# Patient Record
Sex: FEMALE | Race: BLACK OR AFRICAN AMERICAN | NOT HISPANIC OR LATINO | URBAN - METROPOLITAN AREA
[De-identification: names, ages, dates, MRNs, and addresses within clinical notes are randomized per-mention and may not be internally consistent; named-entity substitution may affect disease eponyms.]

---

## 2017-07-29 ENCOUNTER — EMERGENCY (EMERGENCY)
Facility: HOSPITAL | Age: 67
LOS: 0 days | Discharge: HOME | End: 2017-07-29
Attending: EMERGENCY MEDICINE
Payer: COMMERCIAL

## 2017-07-29 VITALS
DIASTOLIC BLOOD PRESSURE: 108 MMHG | HEIGHT: 64 IN | HEART RATE: 74 BPM | WEIGHT: 160.06 LBS | TEMPERATURE: 98 F | RESPIRATION RATE: 18 BRPM | SYSTOLIC BLOOD PRESSURE: 168 MMHG | OXYGEN SATURATION: 98 %

## 2017-07-29 VITALS
DIASTOLIC BLOOD PRESSURE: 90 MMHG | TEMPERATURE: 98 F | HEART RATE: 68 BPM | RESPIRATION RATE: 13 BRPM | SYSTOLIC BLOOD PRESSURE: 137 MMHG | OXYGEN SATURATION: 98 %

## 2017-07-29 DIAGNOSIS — R07.89 OTHER CHEST PAIN: ICD-10-CM

## 2017-07-29 LAB
ALBUMIN SERPL ELPH-MCNC: 3.2 G/DL — LOW (ref 3.3–5)
ALP SERPL-CCNC: 70 U/L — SIGNIFICANT CHANGE UP (ref 40–120)
ALT FLD-CCNC: 28 U/L — SIGNIFICANT CHANGE UP (ref 12–78)
ANION GAP SERPL CALC-SCNC: 7 MMOL/L — SIGNIFICANT CHANGE UP (ref 5–17)
APPEARANCE UR: CLEAR — SIGNIFICANT CHANGE UP
APTT BLD: 28.8 SEC — SIGNIFICANT CHANGE UP (ref 27.5–37.4)
AST SERPL-CCNC: 16 U/L — SIGNIFICANT CHANGE UP (ref 15–37)
BILIRUB SERPL-MCNC: 0.4 MG/DL — SIGNIFICANT CHANGE UP (ref 0.2–1.2)
BILIRUB UR-MCNC: NEGATIVE — SIGNIFICANT CHANGE UP
BUN SERPL-MCNC: 12 MG/DL — SIGNIFICANT CHANGE UP (ref 7–23)
CALCIUM SERPL-MCNC: 8.9 MG/DL — SIGNIFICANT CHANGE UP (ref 8.5–10.1)
CHLORIDE SERPL-SCNC: 104 MMOL/L — SIGNIFICANT CHANGE UP (ref 96–108)
CO2 SERPL-SCNC: 28 MMOL/L — SIGNIFICANT CHANGE UP (ref 22–31)
COLOR SPEC: YELLOW — SIGNIFICANT CHANGE UP
CREAT SERPL-MCNC: 0.82 MG/DL — SIGNIFICANT CHANGE UP (ref 0.5–1.3)
DIFF PNL FLD: ABNORMAL
GLUCOSE SERPL-MCNC: 116 MG/DL — HIGH (ref 70–99)
GLUCOSE UR QL: NEGATIVE MG/DL — SIGNIFICANT CHANGE UP
HCT VFR BLD CALC: 40.6 % — SIGNIFICANT CHANGE UP (ref 34.5–45)
HGB BLD-MCNC: 13.1 G/DL — SIGNIFICANT CHANGE UP (ref 11.5–15.5)
INR BLD: 1.04 RATIO — SIGNIFICANT CHANGE UP (ref 0.88–1.16)
KETONES UR-MCNC: NEGATIVE — SIGNIFICANT CHANGE UP
LEUKOCYTE ESTERASE UR-ACNC: NEGATIVE — SIGNIFICANT CHANGE UP
MCHC RBC-ENTMCNC: 30.4 PG — SIGNIFICANT CHANGE UP (ref 27–34)
MCHC RBC-ENTMCNC: 32.1 GM/DL — SIGNIFICANT CHANGE UP (ref 32–36)
MCV RBC AUTO: 94.5 FL — SIGNIFICANT CHANGE UP (ref 80–100)
NITRITE UR-MCNC: NEGATIVE — SIGNIFICANT CHANGE UP
PH UR: 8 — SIGNIFICANT CHANGE UP (ref 5–8)
PLATELET # BLD AUTO: 251 K/UL — SIGNIFICANT CHANGE UP (ref 150–400)
POTASSIUM SERPL-MCNC: 4 MMOL/L — SIGNIFICANT CHANGE UP (ref 3.5–5.3)
POTASSIUM SERPL-SCNC: 4 MMOL/L — SIGNIFICANT CHANGE UP (ref 3.5–5.3)
PROT SERPL-MCNC: 7.6 GM/DL — SIGNIFICANT CHANGE UP (ref 6–8.3)
PROT UR-MCNC: NEGATIVE MG/DL — SIGNIFICANT CHANGE UP
PROTHROM AB SERPL-ACNC: 11.3 SEC — SIGNIFICANT CHANGE UP (ref 9.8–12.7)
RBC # BLD: 4.3 M/UL — SIGNIFICANT CHANGE UP (ref 3.8–5.2)
RBC # FLD: 10.8 % — LOW (ref 11–15)
SODIUM SERPL-SCNC: 139 MMOL/L — SIGNIFICANT CHANGE UP (ref 135–145)
SP GR SPEC: 1.01 — SIGNIFICANT CHANGE UP (ref 1.01–1.02)
TROPONIN I SERPL-MCNC: <.015 NG/ML — SIGNIFICANT CHANGE UP (ref 0.01–0.04)
UROBILINOGEN FLD QL: NEGATIVE MG/DL — SIGNIFICANT CHANGE UP
WBC # BLD: 5.5 K/UL — SIGNIFICANT CHANGE UP (ref 3.8–10.5)
WBC # FLD AUTO: 5.5 K/UL — SIGNIFICANT CHANGE UP (ref 3.8–10.5)

## 2017-07-29 PROCEDURE — 71010: CPT | Mod: 26

## 2017-07-29 PROCEDURE — 93010 ELECTROCARDIOGRAM REPORT: CPT

## 2017-07-29 PROCEDURE — 99284 EMERGENCY DEPT VISIT MOD MDM: CPT

## 2017-07-29 RX ORDER — ASPIRIN/CALCIUM CARB/MAGNESIUM 324 MG
325 TABLET ORAL ONCE
Qty: 0 | Refills: 0 | Status: COMPLETED | OUTPATIENT
Start: 2017-07-29 | End: 2017-07-29

## 2017-07-29 RX ORDER — FAMOTIDINE 10 MG/ML
1 INJECTION INTRAVENOUS
Qty: 14 | Refills: 0
Start: 2017-07-29 | End: 2017-08-05

## 2017-07-29 RX ADMIN — Medication 325 MILLIGRAM(S): at 08:58

## 2017-07-29 NOTE — ED PROVIDER NOTE - OBJECTIVE STATEMENT
68 yo F with chest pain from right neck to left arm and back.  Pt. says it's been present for 4 days now.  She describes pain as pressure-like.  Comes and goes, resting in a quiet room makes it better.  No other complaints, currently.  ROS: negative for fever, cough, headache, shortness of breath, abd pain, nausea, vomiting, diarrhea, rash, paresthesia, and weakness.   PMH: NIDDM, HTN; Meds: metformin, valsartan; SH: Denies smoking/drinking/drug use

## 2017-07-29 NOTE — ED ADULT TRIAGE NOTE - TEMPERATURE IN FAHRENHEIT (DEGREES F)
Agus Razo   MRN: [id number]    Department:  [facility] in [place]   Date of Visit:  12/3/2019           Disclosure     Insurance plans vary and the physician(s) referred by the ER may not be covered by your plan.  Please con tell this physician (or your personal doctor if your instructions are to return to your personal doctor) about any new or lasting problems. The primary care or specialist physician will see patients referred from the [facility] Emergency Department.  Farzad Rivera 97.8

## 2017-07-29 NOTE — ED PROVIDER NOTE - MEDICAL DECISION MAKING DETAILS
68 yo F with chest pain for 4 days  -cxr, ekg, cardiac monitor, basic labs, trop, asa, ua, cx  -f/u results

## 2017-07-29 NOTE — ED PROVIDER NOTE - PHYSICAL EXAMINATION
Vitals: htn at 168/108, otherwise WNL  Gen: AAOx3, NAD, sitting comfortably in stretcher, non-toxic, calm and cooperative  Head: ncat, perrla, eomi b/l  Neck: supple, no lymphadenopathy, no midline deviation  Heart: rrr, no m/r/g  Lungs: CTA b/l, no rales/ronchi/wheezes  Abd: soft, nontender, non-distended, no rebound or guarding  Ext: no clubbing/cyanosis/edema  Neuro: sensation and muscle strength intact b/l, steady gait

## 2017-07-29 NOTE — ED ADULT NURSE NOTE - OBJECTIVE STATEMENT
Pt with chest  pains on and off for 1 week . The pain worse last night. She was not able to sleep. The pan 10/10 tightness and squeezing to her chest radiating to her back. Occasional cough nonproductive. She has a history of HTN on medication.

## 2017-07-30 LAB
CULTURE RESULTS: SIGNIFICANT CHANGE UP
SPECIMEN SOURCE: SIGNIFICANT CHANGE UP

## 2019-03-29 ENCOUNTER — APPOINTMENT (OUTPATIENT)
Dept: ELECTROPHYSIOLOGY | Facility: CLINIC | Age: 69
End: 2019-03-29
Payer: MEDICARE

## 2019-04-02 ENCOUNTER — NON-APPOINTMENT (OUTPATIENT)
Age: 69
End: 2019-04-02

## 2019-04-02 ENCOUNTER — APPOINTMENT (OUTPATIENT)
Dept: ELECTROPHYSIOLOGY | Facility: CLINIC | Age: 69
End: 2019-04-02
Payer: MEDICARE

## 2019-04-02 VITALS — HEIGHT: 64 IN | OXYGEN SATURATION: 98 % | HEART RATE: 71 BPM

## 2019-04-02 VITALS — DIASTOLIC BLOOD PRESSURE: 87 MMHG | SYSTOLIC BLOOD PRESSURE: 152 MMHG

## 2019-04-02 VITALS — SYSTOLIC BLOOD PRESSURE: 150 MMHG | DIASTOLIC BLOOD PRESSURE: 97 MMHG

## 2019-04-02 VITALS — WEIGHT: 158 LBS

## 2019-04-02 DIAGNOSIS — Z86.79 PERSONAL HISTORY OF OTHER DISEASES OF THE CIRCULATORY SYSTEM: ICD-10-CM

## 2019-04-02 PROCEDURE — 99204 OFFICE O/P NEW MOD 45 MIN: CPT

## 2019-04-02 PROCEDURE — 93000 ELECTROCARDIOGRAM COMPLETE: CPT | Mod: 59

## 2019-04-02 PROCEDURE — 93280 PM DEVICE PROGR EVAL DUAL: CPT

## 2019-04-02 NOTE — PHYSICAL EXAM
[General Appearance - Well Developed] : well developed [Normal Appearance] : normal appearance [Well Groomed] : well groomed [General Appearance - Well Nourished] : well nourished [No Deformities] : no deformities [General Appearance - In No Acute Distress] : no acute distress [Normal Conjunctiva] : the conjunctiva exhibited no abnormalities [Eyelids - No Xanthelasma] : the eyelids demonstrated no xanthelasmas [Normal Oral Mucosa] : normal oral mucosa [No Oral Pallor] : no oral pallor [No Oral Cyanosis] : no oral cyanosis [Normal Jugular Venous A Waves Present] : normal jugular venous A waves present [Normal Jugular Venous V Waves Present] : normal jugular venous V waves present [No Jugular Venous Bustos A Waves] : no jugular venous bustos A waves [Heart Rate And Rhythm] : heart rate and rhythm were normal [Heart Sounds] : normal S1 and S2 [Murmurs] : no murmurs present [Respiration, Rhythm And Depth] : normal respiratory rhythm and effort [Exaggerated Use Of Accessory Muscles For Inspiration] : no accessory muscle use [Auscultation Breath Sounds / Voice Sounds] : lungs were clear to auscultation bilaterally [Abdomen Soft] : soft [Abdomen Tenderness] : non-tender [Abdomen Mass (___ Cm)] : no abdominal mass palpated [Abnormal Walk] : normal gait [Gait - Sufficient For Exercise Testing] : the gait was sufficient for exercise testing [Nail Clubbing] : no clubbing of the fingernails [Cyanosis, Localized] : no localized cyanosis [Petechial Hemorrhages (___cm)] : no petechial hemorrhages [] : no ischemic changes [Oriented To Time, Place, And Person] : oriented to person, place, and time [Affect] : the affect was normal [Mood] : the mood was normal [No Anxiety] : not feeling anxious

## 2019-04-03 NOTE — DISCUSSION/SUMMARY
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 Admits doing well but does note episodes of palpitations which occur spontaneously and are brief in duration. Denies any recent cardiac work up and no follow up with a Cardiologist. Denies chest pain, SOB, syncope or near syncope. Device check performed today revealed device in good working status with adequate pacing/sensing thresholds although brief episodes of noise noted on atrial lead. Also noted episodes of AT and one episode of NSVT. Recommend ECHO/stress test and initiate metoprolol succ 25mg daily. Will continue regular f/u in device as scheduled. \par \par Sincerely,\par \par Jonah Gutierrez MD

## 2019-04-03 NOTE — HISTORY OF PRESENT ILLNESS
[FreeTextEntry1] : Mariza Sapp MD\par \par I saw Cinthya Larsen on April 2, 2019. As you know, she is a 67y/o woman with Hx of HTN and complete AV block s/p dual chamber PPM placement in [place], [place] who presents today to transition care of her device. Admits doing well but does note episodes of palpitations which occur spontaneously and are brief in duration. Denies any recent cardiac work up and no follow up with a Cardiologist. Denies chest pain, SOB, syncope or near syncope. \par

## 2019-04-24 ENCOUNTER — APPOINTMENT (OUTPATIENT)
Dept: CV DIAGNOSITCS | Facility: HOSPITAL | Age: 69
End: 2019-04-24

## 2019-04-24 ENCOUNTER — APPOINTMENT (OUTPATIENT)
Dept: CV DIAGNOSTICS | Facility: HOSPITAL | Age: 69
End: 2019-04-24

## 2019-04-24 ENCOUNTER — OUTPATIENT (OUTPATIENT)
Dept: OUTPATIENT SERVICES | Facility: HOSPITAL | Age: 69
LOS: 1 days | End: 2019-04-24
Payer: MEDICARE

## 2019-04-24 ENCOUNTER — OTHER (OUTPATIENT)
Age: 69
End: 2019-04-24

## 2019-04-24 DIAGNOSIS — I47.2 VENTRICULAR TACHYCARDIA: ICD-10-CM

## 2019-04-24 PROCEDURE — 93016 CV STRESS TEST SUPVJ ONLY: CPT | Mod: GC

## 2019-04-24 PROCEDURE — 93018 CV STRESS TEST I&R ONLY: CPT | Mod: GC

## 2019-04-24 PROCEDURE — 78452 HT MUSCLE IMAGE SPECT MULT: CPT | Mod: 26

## 2019-07-25 ENCOUNTER — APPOINTMENT (OUTPATIENT)
Dept: ELECTROPHYSIOLOGY | Facility: CLINIC | Age: 69
End: 2019-07-25
Payer: MEDICARE

## 2019-07-25 PROCEDURE — 93294 REM INTERROG EVL PM/LDLS PM: CPT

## 2019-07-25 PROCEDURE — 93296 REM INTERROG EVL PM/IDS: CPT

## 2019-08-27 ENCOUNTER — EMERGENCY (EMERGENCY)
Facility: HOSPITAL | Age: 69
LOS: 0 days | Discharge: HOME | End: 2019-08-27
Attending: STUDENT IN AN ORGANIZED HEALTH CARE EDUCATION/TRAINING PROGRAM
Payer: MEDICARE

## 2019-08-27 VITALS
OXYGEN SATURATION: 99 % | HEIGHT: 65 IN | TEMPERATURE: 99 F | WEIGHT: 160.06 LBS | DIASTOLIC BLOOD PRESSURE: 97 MMHG | SYSTOLIC BLOOD PRESSURE: 147 MMHG | HEART RATE: 81 BPM

## 2019-08-27 VITALS
SYSTOLIC BLOOD PRESSURE: 161 MMHG | HEART RATE: 71 BPM | RESPIRATION RATE: 16 BRPM | OXYGEN SATURATION: 100 % | DIASTOLIC BLOOD PRESSURE: 96 MMHG

## 2019-08-27 DIAGNOSIS — R51 HEADACHE: ICD-10-CM

## 2019-08-27 DIAGNOSIS — I10 ESSENTIAL (PRIMARY) HYPERTENSION: ICD-10-CM

## 2019-08-27 LAB
ALBUMIN SERPL ELPH-MCNC: 3.1 G/DL — LOW (ref 3.3–5)
ALP SERPL-CCNC: 72 U/L — SIGNIFICANT CHANGE UP (ref 40–120)
ALT FLD-CCNC: 29 U/L — SIGNIFICANT CHANGE UP (ref 12–78)
ANION GAP SERPL CALC-SCNC: 5 MMOL/L — SIGNIFICANT CHANGE UP (ref 5–17)
AST SERPL-CCNC: 16 U/L — SIGNIFICANT CHANGE UP (ref 15–37)
BASOPHILS # BLD AUTO: 0.02 K/UL — SIGNIFICANT CHANGE UP (ref 0–0.2)
BASOPHILS NFR BLD AUTO: 0.4 % — SIGNIFICANT CHANGE UP (ref 0–2)
BILIRUB SERPL-MCNC: 0.2 MG/DL — SIGNIFICANT CHANGE UP (ref 0.2–1.2)
BUN SERPL-MCNC: 21 MG/DL — SIGNIFICANT CHANGE UP (ref 7–23)
CALCIUM SERPL-MCNC: 9 MG/DL — SIGNIFICANT CHANGE UP (ref 8.5–10.1)
CHLORIDE SERPL-SCNC: 106 MMOL/L — SIGNIFICANT CHANGE UP (ref 96–108)
CO2 SERPL-SCNC: 30 MMOL/L — SIGNIFICANT CHANGE UP (ref 22–31)
CREAT SERPL-MCNC: 0.98 MG/DL — SIGNIFICANT CHANGE UP (ref 0.5–1.3)
EOSINOPHIL # BLD AUTO: 0.1 K/UL — SIGNIFICANT CHANGE UP (ref 0–0.5)
EOSINOPHIL NFR BLD AUTO: 1.9 % — SIGNIFICANT CHANGE UP (ref 0–6)
GLUCOSE SERPL-MCNC: 127 MG/DL — HIGH (ref 70–99)
HCT VFR BLD CALC: 38.6 % — SIGNIFICANT CHANGE UP (ref 34.5–45)
HGB BLD-MCNC: 12.8 G/DL — SIGNIFICANT CHANGE UP (ref 11.5–15.5)
IMM GRANULOCYTES NFR BLD AUTO: 0.2 % — SIGNIFICANT CHANGE UP (ref 0–1.5)
LYMPHOCYTES # BLD AUTO: 2.36 K/UL — SIGNIFICANT CHANGE UP (ref 1–3.3)
LYMPHOCYTES # BLD AUTO: 44.3 % — HIGH (ref 13–44)
MCHC RBC-ENTMCNC: 31.2 PG — SIGNIFICANT CHANGE UP (ref 27–34)
MCHC RBC-ENTMCNC: 33.2 GM/DL — SIGNIFICANT CHANGE UP (ref 32–36)
MCV RBC AUTO: 94.1 FL — SIGNIFICANT CHANGE UP (ref 80–100)
MONOCYTES # BLD AUTO: 0.7 K/UL — SIGNIFICANT CHANGE UP (ref 0–0.9)
MONOCYTES NFR BLD AUTO: 13.1 % — SIGNIFICANT CHANGE UP (ref 2–14)
NEUTROPHILS # BLD AUTO: 2.14 K/UL — SIGNIFICANT CHANGE UP (ref 1.8–7.4)
NEUTROPHILS NFR BLD AUTO: 40.1 % — LOW (ref 43–77)
NRBC # BLD: 0 /100 WBCS — SIGNIFICANT CHANGE UP (ref 0–0)
PLATELET # BLD AUTO: 219 K/UL — SIGNIFICANT CHANGE UP (ref 150–400)
POTASSIUM SERPL-MCNC: 4.2 MMOL/L — SIGNIFICANT CHANGE UP (ref 3.5–5.3)
POTASSIUM SERPL-SCNC: 4.2 MMOL/L — SIGNIFICANT CHANGE UP (ref 3.5–5.3)
PROT SERPL-MCNC: 7 GM/DL — SIGNIFICANT CHANGE UP (ref 6–8.3)
RBC # BLD: 4.1 M/UL — SIGNIFICANT CHANGE UP (ref 3.8–5.2)
RBC # FLD: 11.8 % — SIGNIFICANT CHANGE UP (ref 10.3–14.5)
SODIUM SERPL-SCNC: 141 MMOL/L — SIGNIFICANT CHANGE UP (ref 135–145)
WBC # BLD: 5.33 K/UL — SIGNIFICANT CHANGE UP (ref 3.8–10.5)
WBC # FLD AUTO: 5.33 K/UL — SIGNIFICANT CHANGE UP (ref 3.8–10.5)

## 2019-08-27 PROCEDURE — 70496 CT ANGIOGRAPHY HEAD: CPT | Mod: 26

## 2019-08-27 PROCEDURE — 70498 CT ANGIOGRAPHY NECK: CPT | Mod: 26

## 2019-08-27 PROCEDURE — 99284 EMERGENCY DEPT VISIT MOD MDM: CPT

## 2019-08-27 RX ORDER — METFORMIN HYDROCHLORIDE 850 MG/1
1 TABLET ORAL
Qty: 0 | Refills: 0 | DISCHARGE

## 2019-08-27 RX ORDER — OXYCODONE AND ACETAMINOPHEN 5; 325 MG/1; MG/1
1 TABLET ORAL ONCE
Refills: 0 | Status: DISCONTINUED | OUTPATIENT
Start: 2019-08-27 | End: 2019-08-27

## 2019-08-27 RX ADMIN — OXYCODONE AND ACETAMINOPHEN 1 TABLET(S): 5; 325 TABLET ORAL at 18:15

## 2019-08-27 RX ADMIN — OXYCODONE AND ACETAMINOPHEN 1 TABLET(S): 5; 325 TABLET ORAL at 17:44

## 2019-08-27 NOTE — ED PROVIDER NOTE - OBJECTIVE STATEMENT
69 year old female presents today accompanied with her daughter c/o 5 day h/o intermittent headache now worsening, she describes posterior headache, throbbing rated 10/10 associated with dizziness and blurry vision (-) speech changes (-) weakness (-) slurred speech (-) fevers (-) nuchal rigidity +chills (-) bodyaches (-) sinus congestion or tenderness

## 2019-08-27 NOTE — ED PROVIDER NOTE - NEUROLOGICAL, MLM
Alert and oriented, no focal deficits, no motor or sensory deficits. (-) nuchal rigidity/meningeal signs

## 2019-08-27 NOTE — ED PROVIDER NOTE - PATIENT PORTAL LINK FT
You can access the FollowMyHealth Patient Portal offered by [facility] by registering at the following website: [email or web address]. By joining [employer]’s FollowMyHealth portal, you will also be able to view your health information using other applications (apps) compatible with our system.

## 2019-08-27 NOTE — ED PROVIDER NOTE - CARE PROVIDER_API CALL
Gama Dick)  Otolaryngology  [address] [place] [address], [address]  [place] [address]  Phone: [phone]  Fax: [phone]  Follow Up Time: 4-6 Days    Barbra Gambino)  Neurology  [address] [place] [id number]  Phone: [phone]  Fax: [phone]  Follow Up Time: 4-6 Days

## 2019-08-27 NOTE — ED PROVIDER NOTE - PROGRESS NOTE DETAILS
Results reported to patient--grossly benign, CTA shows vocal cord thickening (referred to ENT for f/u to elucidate ?pathology), labs wnl  Pt. reports feeling better after meds  pt. agrees to f/u with primary care outpt., neuro referral and ENT referrals given with D/C papers  pt. understands to return to ED if symptoms worsen; will d/c

## 2019-08-27 NOTE — ED ADULT TRIAGE NOTE - CHIEF COMPLAINT QUOTE
pt walked  c/o headache, chills, blurry vision started 2 days ago denies any nausea vomiting, hx of htn , cad

## 2019-08-27 NOTE — ED PROVIDER NOTE - PROVIDER TOKENS
PROVIDER:[TOKEN:[[id number]],FOLLOWUP:[4-6 Days]],PROVIDER:[TOKEN:[[id number]],FOLLOWUP:[4-6 Days]]

## 2019-10-07 ENCOUNTER — APPOINTMENT (OUTPATIENT)
Dept: ELECTROPHYSIOLOGY | Facility: CLINIC | Age: 69
End: 2019-10-07

## 2019-10-23 ENCOUNTER — APPOINTMENT (OUTPATIENT)
Dept: ELECTROPHYSIOLOGY | Facility: CLINIC | Age: 69
End: 2019-10-23
Payer: MEDICARE

## 2019-10-23 PROCEDURE — 93280 PM DEVICE PROGR EVAL DUAL: CPT

## 2019-10-31 ENCOUNTER — APPOINTMENT (OUTPATIENT)
Dept: CARDIOLOGY | Facility: CLINIC | Age: 69
End: 2019-10-31
Payer: MEDICARE

## 2019-10-31 VITALS
HEIGHT: 64 IN | OXYGEN SATURATION: 99 % | BODY MASS INDEX: 27.83 KG/M2 | WEIGHT: 163 LBS | SYSTOLIC BLOOD PRESSURE: 147 MMHG | HEART RATE: 71 BPM | DIASTOLIC BLOOD PRESSURE: 80 MMHG

## 2019-10-31 PROCEDURE — 99205 OFFICE O/P NEW HI 60 MIN: CPT

## 2019-10-31 PROCEDURE — 93000 ELECTROCARDIOGRAM COMPLETE: CPT

## 2020-01-24 ENCOUNTER — APPOINTMENT (OUTPATIENT)
Dept: ELECTROPHYSIOLOGY | Facility: CLINIC | Age: 70
End: 2020-01-24
Payer: MEDICARE

## 2020-01-24 PROCEDURE — 93296 REM INTERROG EVL PM/IDS: CPT

## 2020-01-24 PROCEDURE — 93294 REM INTERROG EVL PM/LDLS PM: CPT

## 2020-04-24 ENCOUNTER — APPOINTMENT (OUTPATIENT)
Dept: ELECTROPHYSIOLOGY | Facility: CLINIC | Age: 70
End: 2020-04-24
Payer: MEDICARE

## 2020-04-24 PROCEDURE — 93294 REM INTERROG EVL PM/LDLS PM: CPT

## 2020-04-24 PROCEDURE — 93296 REM INTERROG EVL PM/IDS: CPT

## 2020-04-30 ENCOUNTER — APPOINTMENT (OUTPATIENT)
Dept: CARDIOLOGY | Facility: CLINIC | Age: 70
End: 2020-04-30

## 2020-04-30 ENCOUNTER — APPOINTMENT (OUTPATIENT)
Dept: CV DIAGNOSITCS | Facility: HOSPITAL | Age: 70
End: 2020-04-30

## 2020-05-26 ENCOUNTER — APPOINTMENT (OUTPATIENT)
Dept: INTERNAL MEDICINE | Facility: CLINIC | Age: 70
End: 2020-05-26

## 2020-07-09 ENCOUNTER — APPOINTMENT (OUTPATIENT)
Dept: INTERNAL MEDICINE | Facility: CLINIC | Age: 70
End: 2020-07-09
Payer: MEDICARE

## 2020-07-09 VITALS
WEIGHT: 168 LBS | OXYGEN SATURATION: 98 % | SYSTOLIC BLOOD PRESSURE: 160 MMHG | TEMPERATURE: 98.4 F | BODY MASS INDEX: 28.68 KG/M2 | HEART RATE: 62 BPM | DIASTOLIC BLOOD PRESSURE: 80 MMHG | HEIGHT: 64 IN

## 2020-07-09 DIAGNOSIS — H35.30 UNSPECIFIED MACULAR DEGENERATION: ICD-10-CM

## 2020-07-09 PROCEDURE — G0439: CPT

## 2020-07-09 NOTE — PHYSICAL EXAM
[Normal Appearance] : normal in appearance [No Axillary Lymphadenopathy] : no axillary lymphadenopathy [No Masses] : no palpable masses [No Mass] : no mass [Stool Occult Blood] : stool positive for occult blood [Normal] : affect was normal and insight and judgment were intact [FreeTextEntry1] : guaiac neg

## 2020-07-09 NOTE — HISTORY OF PRESENT ILLNESS
[de-identified] : Neck pain  and sciatic pain both of which she plans to discuss with neuro [FreeTextEntry1] : Here for full PE, continues to complain of pain in her neck. Has an appointment on 7/16 for neurology . Otherwise well

## 2020-07-09 NOTE — REVIEW OF SYSTEMS
[Back Pain] : back pain [Negative] : Psychiatric [FreeTextEntry9] : neck and pain in right lower back and to  thigh radiating

## 2020-07-09 NOTE — ASSESSMENT
[FreeTextEntry1] : 1. HTN : not as well controlled as i would like to see it, to increase her metoprolol coby since she has a PPM no worries of heart block\par 2 sciatica: will try pred taper again and has a Eval with neuro next week\par 3. PPM per Jonah Gutierrez\par 4 Health Care maintenance: needs old records for vaccine records, optho, GYN, mammo, and colon poss, labs today, no need for ekg as done with cardiol

## 2020-07-09 NOTE — HEALTH RISK ASSESSMENT
[Very Good] : ~his/her~ current health as very good [No] : No [No falls in past year] : Patient reported no falls in the past year [0] : 2) Feeling down, depressed, or hopeless: Not at all (0) [Hepatitis C test offered] : Hepatitis C test offered [Behavior] : difficulty with behavior [Language] : difficulty with language [Learning/Retaining New Information] : difficulty learning/retaining new information [Spatial Ability and Orientation] : difficulty with spatial ability and orientation [Reasoning] : difficulty with reasoning [Handling Complex Tasks] : difficulty handling complex tasks [None] : None [With Family] : lives with family [Retired] : retired [College] : College [Feels Safe at Home] : Feels safe at home [# Of Children ___] : has [unfilled] children [Reports normal functional visual acuity (ie: able to read med bottle)] : Reports normal functional visual acuity [Carbon Monoxide Detector] : carbon monoxide detector [Smoke Detector] : smoke detector [Safety elements used in home] : safety elements used in home [Seat Belt] :  uses seat belt [With Patient/Caregiver] : With Patient/Caregiver [de-identified] : for Neuro  [] : No [de-identified] : exercises  [Reports changes in hearing] : Reports no changes in hearing [Change in mental status noted] : No change in mental status noted [Reports changes in vision] : Reports no changes in vision [Reports changes in dental health] : Reports no changes in dental health [Guns at Home] : no guns at home [TB Exposure] : is not being exposed to tuberculosis [Travel to Developing Areas] : does not  travel to developing areas [Sunscreen] : does not use sunscreen [Caregiver Concerns] : does not have caregiver concerns [MammogramDate] : 2018 [AdvancecareDate] : 07/09/20

## 2020-07-13 LAB
ALBUMIN SERPL ELPH-MCNC: 4.2 G/DL
ALP BLD-CCNC: 67 U/L
ALT SERPL-CCNC: 16 U/L
ANION GAP SERPL CALC-SCNC: 13 MMOL/L
AST SERPL-CCNC: 18 U/L
BASOPHILS # BLD AUTO: 0.02 K/UL
BASOPHILS NFR BLD AUTO: 0.4 %
BILIRUB SERPL-MCNC: 0.5 MG/DL
BUN SERPL-MCNC: 13 MG/DL
CALCIUM SERPL-MCNC: 9.4 MG/DL
CHLORIDE SERPL-SCNC: 104 MMOL/L
CHOLEST SERPL-MCNC: 198 MG/DL
CHOLEST/HDLC SERPL: 3.2 RATIO
CO2 SERPL-SCNC: 23 MMOL/L
CREAT SERPL-MCNC: 0.79 MG/DL
EOSINOPHIL # BLD AUTO: 0.08 K/UL
EOSINOPHIL NFR BLD AUTO: 1.7 %
ESTIMATED AVERAGE GLUCOSE: 134 MG/DL
GLUCOSE SERPL-MCNC: 114 MG/DL
HBA1C MFR BLD HPLC: 6.3 %
HCT VFR BLD CALC: 42.3 %
HDLC SERPL-MCNC: 62 MG/DL
HGB BLD-MCNC: 13.1 G/DL
IMM GRANULOCYTES NFR BLD AUTO: 0.2 %
LDLC SERPL CALC-MCNC: 122 MG/DL
LYMPHOCYTES # BLD AUTO: 2.17 K/UL
LYMPHOCYTES NFR BLD AUTO: 46.1 %
MAN DIFF?: NORMAL
MCHC RBC-ENTMCNC: 30.8 PG
MCHC RBC-ENTMCNC: 31 GM/DL
MCV RBC AUTO: 99.3 FL
MONOCYTES # BLD AUTO: 0.58 K/UL
MONOCYTES NFR BLD AUTO: 12.3 %
NEUTROPHILS # BLD AUTO: 1.85 K/UL
NEUTROPHILS NFR BLD AUTO: 39.3 %
PLATELET # BLD AUTO: 231 K/UL
POTASSIUM SERPL-SCNC: 4.1 MMOL/L
PROT SERPL-MCNC: 7.4 G/DL
RBC # BLD: 4.26 M/UL
RBC # FLD: 12.2 %
SODIUM SERPL-SCNC: 139 MMOL/L
TRIGL SERPL-MCNC: 72 MG/DL
TSH SERPL-ACNC: 0.48 UIU/ML
WBC # FLD AUTO: 4.71 K/UL

## 2020-07-22 ENCOUNTER — APPOINTMENT (OUTPATIENT)
Dept: ELECTROPHYSIOLOGY | Facility: CLINIC | Age: 70
End: 2020-07-22
Payer: MEDICARE

## 2020-07-22 PROCEDURE — 93280 PM DEVICE PROGR EVAL DUAL: CPT

## 2020-07-27 ENCOUNTER — APPOINTMENT (OUTPATIENT)
Dept: ELECTROPHYSIOLOGY | Facility: CLINIC | Age: 70
End: 2020-07-27

## 2020-09-10 ENCOUNTER — APPOINTMENT (OUTPATIENT)
Dept: INTERNAL MEDICINE | Facility: CLINIC | Age: 70
End: 2020-09-10
Payer: MEDICARE

## 2020-09-10 VITALS
SYSTOLIC BLOOD PRESSURE: 140 MMHG | WEIGHT: 168 LBS | HEART RATE: 80 BPM | TEMPERATURE: 98.7 F | BODY MASS INDEX: 28.68 KG/M2 | OXYGEN SATURATION: 98 % | HEIGHT: 64 IN | DIASTOLIC BLOOD PRESSURE: 80 MMHG

## 2020-09-10 DIAGNOSIS — S16.1XXA STRAIN OF MUSCLE, FASCIA AND TENDON AT NECK LEVEL, INITIAL ENCOUNTER: ICD-10-CM

## 2020-09-10 PROCEDURE — 99213 OFFICE O/P EST LOW 20 MIN: CPT

## 2020-09-10 RX ORDER — PREDNISONE 10 MG/1
10 TABLET ORAL
Qty: 15 | Refills: 0 | Status: DISCONTINUED | COMMUNITY
Start: 2020-04-27 | End: 2020-09-10

## 2020-09-10 NOTE — HISTORY OF PRESENT ILLNESS
[FreeTextEntry1] : Here for follow up. Complains of pain in her back and shoulders Still having pain in her hips and with radiation down her right thigh( anterior) and still has pain in the left shoulder( although better)  [de-identified] : Was doing PT but had to stop due to the pandemic. Had MRI of the back ( lower and upper) , takes either an  Aleve or Tylenol when she can no longer tolerate the pain .

## 2020-09-10 NOTE — ASSESSMENT
[FreeTextEntry1] : 1. Sciatica/ shoulder pain: to return to Dr Hull  and discuss her choices, will treat with Mobic 7.5 for two weeks with Prilosec OTC and then follow up as above\par 2. HTN controlled \par 3 hyperglycemia: diet now \par refused flu, going to [facility]  and will not come back until Dec.

## 2020-09-22 ENCOUNTER — RX RENEWAL (OUTPATIENT)
Age: 70
End: 2020-09-22

## 2020-11-10 ENCOUNTER — APPOINTMENT (OUTPATIENT)
Dept: ELECTROPHYSIOLOGY | Facility: CLINIC | Age: 70
End: 2020-11-10

## 2020-11-15 ENCOUNTER — FORM ENCOUNTER (OUTPATIENT)
Age: 70
End: 2020-11-15

## 2020-12-17 ENCOUNTER — RX RENEWAL (OUTPATIENT)
Age: 70
End: 2020-12-17

## 2021-01-04 ENCOUNTER — RX RENEWAL (OUTPATIENT)
Age: 71
End: 2021-01-04

## 2021-01-04 NOTE — ED PROVIDER NOTE - NSCAREINITIATED _GEN_ER
Left v/m for pt to call the office.      Please see below.  Ok to change prescription to estradiol/Estrace?  Please advise.   Carlos Wolff(Attending)

## 2021-01-20 ENCOUNTER — RX RENEWAL (OUTPATIENT)
Age: 71
End: 2021-01-20

## 2021-01-21 ENCOUNTER — RX RENEWAL (OUTPATIENT)
Age: 71
End: 2021-01-21

## 2021-01-26 ENCOUNTER — APPOINTMENT (OUTPATIENT)
Dept: ELECTROPHYSIOLOGY | Facility: CLINIC | Age: 71
End: 2021-01-26
Payer: MEDICARE

## 2021-01-26 PROCEDURE — 93294 REM INTERROG EVL PM/LDLS PM: CPT

## 2021-01-26 PROCEDURE — 93296 REM INTERROG EVL PM/IDS: CPT

## 2021-02-11 ENCOUNTER — APPOINTMENT (OUTPATIENT)
Dept: INTERNAL MEDICINE | Facility: CLINIC | Age: 71
End: 2021-02-11
Payer: MEDICARE

## 2021-02-11 VITALS — DIASTOLIC BLOOD PRESSURE: 80 MMHG | SYSTOLIC BLOOD PRESSURE: 125 MMHG

## 2021-02-11 VITALS
TEMPERATURE: 98.4 F | SYSTOLIC BLOOD PRESSURE: 140 MMHG | HEIGHT: 64 IN | OXYGEN SATURATION: 98 % | WEIGHT: 166 LBS | HEART RATE: 78 BPM | DIASTOLIC BLOOD PRESSURE: 80 MMHG | BODY MASS INDEX: 28.34 KG/M2

## 2021-02-11 DIAGNOSIS — M54.31 SCIATICA, RIGHT SIDE: ICD-10-CM

## 2021-02-11 DIAGNOSIS — Z00.00 ENCOUNTER FOR GENERAL ADULT MEDICAL EXAMINATION W/OUT ABNORMAL FINDINGS: ICD-10-CM

## 2021-02-11 DIAGNOSIS — R73.9 HYPERGLYCEMIA, UNSPECIFIED: ICD-10-CM

## 2021-02-11 PROCEDURE — 99214 OFFICE O/P EST MOD 30 MIN: CPT

## 2021-02-11 PROCEDURE — 99072 ADDL SUPL MATRL&STAF TM PHE: CPT

## 2021-02-11 NOTE — ASSESSMENT
[FreeTextEntry1] : 1. HTN Controlled \par 2. atrial tachycardia : controlled since ablation and PPM which is followed by cardiology \par 3. Sciatica: to take her Mobic as needed , \par 4. Hyperglycemia : recheck today  was 6.3

## 2021-02-11 NOTE — REVIEW OF SYSTEMS
[Joint Pain] : joint pain [FreeTextEntry5] : no palpitations, all controlled  [FreeTextEntry9] : occ right hip

## 2021-02-12 LAB
ALBUMIN SERPL ELPH-MCNC: 3.9 G/DL
ALP BLD-CCNC: 77 U/L
ALT SERPL-CCNC: 20 U/L
ANION GAP SERPL CALC-SCNC: 11 MMOL/L
AST SERPL-CCNC: 15 U/L
BILIRUB SERPL-MCNC: 0.2 MG/DL
BUN SERPL-MCNC: 17 MG/DL
CALCIUM SERPL-MCNC: 9.2 MG/DL
CHLORIDE SERPL-SCNC: 102 MMOL/L
CO2 SERPL-SCNC: 23 MMOL/L
CREAT SERPL-MCNC: 0.98 MG/DL
ESTIMATED AVERAGE GLUCOSE: 163 MG/DL
GLUCOSE SERPL-MCNC: 260 MG/DL
HBA1C MFR BLD HPLC: 7.3 %
POTASSIUM SERPL-SCNC: 4.1 MMOL/L
PROT SERPL-MCNC: 7 G/DL
SODIUM SERPL-SCNC: 137 MMOL/L

## 2021-02-17 ENCOUNTER — RX RENEWAL (OUTPATIENT)
Age: 71
End: 2021-02-17

## 2021-03-03 ENCOUNTER — NON-APPOINTMENT (OUTPATIENT)
Age: 71
End: 2021-03-03

## 2021-03-04 ENCOUNTER — APPOINTMENT (OUTPATIENT)
Dept: INTERNAL MEDICINE | Facility: CLINIC | Age: 71
End: 2021-03-04

## 2021-03-19 ENCOUNTER — EMERGENCY (EMERGENCY)
Facility: HOSPITAL | Age: 71
LOS: 0 days | Discharge: HOME | End: 2021-03-19
Attending: STUDENT IN AN ORGANIZED HEALTH CARE EDUCATION/TRAINING PROGRAM
Payer: MEDICARE

## 2021-03-19 VITALS
OXYGEN SATURATION: 98 % | TEMPERATURE: 98 F | DIASTOLIC BLOOD PRESSURE: 91 MMHG | HEART RATE: 70 BPM | SYSTOLIC BLOOD PRESSURE: 163 MMHG | RESPIRATION RATE: 16 BRPM

## 2021-03-19 VITALS
HEART RATE: 70 BPM | TEMPERATURE: 98 F | OXYGEN SATURATION: 98 % | RESPIRATION RATE: 15 BRPM | WEIGHT: 166.89 LBS | DIASTOLIC BLOOD PRESSURE: 104 MMHG | SYSTOLIC BLOOD PRESSURE: 162 MMHG | HEIGHT: 65 IN

## 2021-03-19 DIAGNOSIS — R31.9 HEMATURIA, UNSPECIFIED: ICD-10-CM

## 2021-03-19 DIAGNOSIS — Z20.822 CONTACT WITH AND (SUSPECTED) EXPOSURE TO COVID-19: ICD-10-CM

## 2021-03-19 DIAGNOSIS — I10 ESSENTIAL (PRIMARY) HYPERTENSION: ICD-10-CM

## 2021-03-19 DIAGNOSIS — N30.90 CYSTITIS, UNSPECIFIED WITHOUT HEMATURIA: ICD-10-CM

## 2021-03-19 DIAGNOSIS — R30.0 DYSURIA: ICD-10-CM

## 2021-03-19 DIAGNOSIS — R10.31 RIGHT LOWER QUADRANT PAIN: ICD-10-CM

## 2021-03-19 DIAGNOSIS — E11.9 TYPE 2 DIABETES MELLITUS WITHOUT COMPLICATIONS: ICD-10-CM

## 2021-03-19 LAB
ALBUMIN SERPL ELPH-MCNC: 3 G/DL — LOW (ref 3.3–5)
ALP SERPL-CCNC: 64 U/L — SIGNIFICANT CHANGE UP (ref 40–120)
ALT FLD-CCNC: 27 U/L — SIGNIFICANT CHANGE UP (ref 12–78)
ANION GAP SERPL CALC-SCNC: 4 MMOL/L — LOW (ref 5–17)
APPEARANCE UR: CLEAR — SIGNIFICANT CHANGE UP
AST SERPL-CCNC: 16 U/L — SIGNIFICANT CHANGE UP (ref 15–37)
BACTERIA # UR AUTO: ABNORMAL
BILIRUB SERPL-MCNC: 0.4 MG/DL — SIGNIFICANT CHANGE UP (ref 0.2–1.2)
BILIRUB UR-MCNC: NEGATIVE — SIGNIFICANT CHANGE UP
BUN SERPL-MCNC: 19 MG/DL — SIGNIFICANT CHANGE UP (ref 7–23)
CALCIUM SERPL-MCNC: 8.8 MG/DL — SIGNIFICANT CHANGE UP (ref 8.5–10.1)
CHLORIDE SERPL-SCNC: 106 MMOL/L — SIGNIFICANT CHANGE UP (ref 96–108)
CO2 SERPL-SCNC: 27 MMOL/L — SIGNIFICANT CHANGE UP (ref 22–31)
COLOR SPEC: YELLOW — SIGNIFICANT CHANGE UP
CREAT SERPL-MCNC: 1.09 MG/DL — SIGNIFICANT CHANGE UP (ref 0.5–1.3)
DIFF PNL FLD: ABNORMAL
EPI CELLS # UR: ABNORMAL
FLUAV AG NPH QL: SIGNIFICANT CHANGE UP
FLUBV AG NPH QL: SIGNIFICANT CHANGE UP
GLUCOSE SERPL-MCNC: 155 MG/DL — HIGH (ref 70–99)
GLUCOSE UR QL: NEGATIVE MG/DL — SIGNIFICANT CHANGE UP
HCT VFR BLD CALC: 37 % — SIGNIFICANT CHANGE UP (ref 34.5–45)
HGB BLD-MCNC: 13.2 G/DL — SIGNIFICANT CHANGE UP (ref 11.5–15.5)
KETONES UR-MCNC: NEGATIVE — SIGNIFICANT CHANGE UP
LEUKOCYTE ESTERASE UR-ACNC: ABNORMAL
MCHC RBC-ENTMCNC: 32.8 PG — SIGNIFICANT CHANGE UP (ref 27–34)
MCHC RBC-ENTMCNC: 35.7 GM/DL — SIGNIFICANT CHANGE UP (ref 32–36)
MCV RBC AUTO: 91.8 FL — SIGNIFICANT CHANGE UP (ref 80–100)
NITRITE UR-MCNC: NEGATIVE — SIGNIFICANT CHANGE UP
NRBC # BLD: 0 /100 WBCS — SIGNIFICANT CHANGE UP (ref 0–0)
PH UR: 5 — SIGNIFICANT CHANGE UP (ref 5–8)
PLATELET # BLD AUTO: 227 K/UL — SIGNIFICANT CHANGE UP (ref 150–400)
POTASSIUM SERPL-MCNC: 3.9 MMOL/L — SIGNIFICANT CHANGE UP (ref 3.5–5.3)
POTASSIUM SERPL-SCNC: 3.9 MMOL/L — SIGNIFICANT CHANGE UP (ref 3.5–5.3)
PROT SERPL-MCNC: 7.3 GM/DL — SIGNIFICANT CHANGE UP (ref 6–8.3)
PROT UR-MCNC: 100 MG/DL
RBC # BLD: 4.03 M/UL — SIGNIFICANT CHANGE UP (ref 3.8–5.2)
RBC # FLD: 12.1 % — SIGNIFICANT CHANGE UP (ref 10.3–14.5)
RBC CASTS # UR COMP ASSIST: >50 /HPF (ref 0–4)
SARS-COV-2 RNA SPEC QL NAA+PROBE: SIGNIFICANT CHANGE UP
SODIUM SERPL-SCNC: 137 MMOL/L — SIGNIFICANT CHANGE UP (ref 135–145)
SP GR SPEC: 1.02 — SIGNIFICANT CHANGE UP (ref 1.01–1.02)
UROBILINOGEN FLD QL: NEGATIVE MG/DL — SIGNIFICANT CHANGE UP
WBC # BLD: 6.23 K/UL — SIGNIFICANT CHANGE UP (ref 3.8–10.5)
WBC # FLD AUTO: 6.23 K/UL — SIGNIFICANT CHANGE UP (ref 3.8–10.5)
WBC UR QL: ABNORMAL

## 2021-03-19 PROCEDURE — 99284 EMERGENCY DEPT VISIT MOD MDM: CPT

## 2021-03-19 PROCEDURE — 93010 ELECTROCARDIOGRAM REPORT: CPT

## 2021-03-19 RX ORDER — CEFUROXIME AXETIL 250 MG
500 TABLET ORAL ONCE
Refills: 0 | Status: COMPLETED | OUTPATIENT
Start: 2021-03-19 | End: 2021-03-19

## 2021-03-19 RX ORDER — CEFUROXIME AXETIL 250 MG
1 TABLET ORAL
Qty: 14 | Refills: 0
Start: 2021-03-19 | End: 2021-03-25

## 2021-03-19 RX ORDER — SODIUM CHLORIDE 9 MG/ML
1000 INJECTION INTRAMUSCULAR; INTRAVENOUS; SUBCUTANEOUS ONCE
Refills: 0 | Status: COMPLETED | OUTPATIENT
Start: 2021-03-19 | End: 2021-03-19

## 2021-03-19 RX ADMIN — SODIUM CHLORIDE 1000 MILLILITER(S): 9 INJECTION INTRAMUSCULAR; INTRAVENOUS; SUBCUTANEOUS at 20:28

## 2021-03-19 RX ADMIN — Medication 500 MILLIGRAM(S): at 20:00

## 2021-03-19 RX ADMIN — SODIUM CHLORIDE 1000 MILLILITER(S): 9 INJECTION INTRAMUSCULAR; INTRAVENOUS; SUBCUTANEOUS at 18:04

## 2021-03-19 NOTE — ED ADULT NURSE NOTE - HOW OFTEN DO YOU HAVE A DRINK CONTAINING ALCOHOL?
You are Important to us! If you had tests done today, we promise to contact you with the results in 3-7 business days. Some results take longer for us to get. If you have not heard from our clinic by the 8th business day, please contact us at [phone]. If the test result is normal (OK), then one of our clinical caregivers will contact you by your preferred method. If the test result is abnormal (!!!), then the Provider who ordered the test will contact you    Look for probiotic yogurts at the supermarket, or things like Kefir ([employer] makes a good one)or probiotic fruit drinks. You can likely find these at any good supermarket, but will probably have a wider selection at [employer], [employer] and and [employer]. Alternatively Probiotic Capsules can be obtained at your pharmacy. Ask for the refrigerator kind. They are not quite as effective as getting the good bacteria through food, but nonetheless are better than nothing.  To obtain a good quality brand, I would encourage you to discuss  products with the pharmacist.
Never

## 2021-03-19 NOTE — ED ADULT TRIAGE NOTE - CHIEF COMPLAINT QUOTE
Burning on urination, pink stain on under wear, pain from right flank to right groin area with chills since last night. PMH DM, HTN.

## 2021-03-19 NOTE — ED PROVIDER NOTE - OBJECTIVE STATEMENT
70f pmhx htn, dm with 1 day of dysuria and milf hematuria. minimal right flank discomfort. no fevers. no n/v.

## 2021-03-19 NOTE — ED ADULT NURSE NOTE - OBJECTIVE STATEMENT
Pt received sitting on stretcher  Pt AOx3 C/o lower abdominal pain rate d5/10, pt states she has a UTI abdomen  soft and  tender,. Denies Nausea, Vomiting, Diarrhea. Skin warm, dry, color appropriate for age and race.

## 2021-03-19 NOTE — ED PROVIDER NOTE - CLINICAL SUMMARY MEDICAL DECISION MAKING FREE TEXT BOX
pierre well appearing. hemodynamically normal. workup consistne twith UTI. possible early pyelonephritis considering patient with mild flank discomfort. after talk of risks/benefits of observing inpatient for pyelonephritis vs. d/c with po abx patinet elected d/c. returnprecautions given

## 2021-03-19 NOTE — ED PROVIDER NOTE - PATIENT PORTAL LINK FT
You can access the FollowMyHealth Patient Portal offered by [facility] by registering at the following website: [email or web address]. By joining [employer]’s FollowMyHealth portal, you will also be able to view your health information using other applications (apps) compatible with our system.

## 2021-03-20 LAB
CULTURE RESULTS: SIGNIFICANT CHANGE UP
SPECIMEN SOURCE: SIGNIFICANT CHANGE UP

## 2021-03-20 RX ORDER — CEFUROXIME AXETIL 250 MG
1 TABLET ORAL
Qty: 14 | Refills: 0
Start: 2021-03-20 | End: 2021-03-26

## 2021-03-21 RX ORDER — CEFUROXIME AXETIL 250 MG
1 TABLET ORAL
Qty: 14 | Refills: 0
Start: 2021-03-21 | End: 2021-03-27

## 2021-04-22 ENCOUNTER — APPOINTMENT (OUTPATIENT)
Dept: ELECTROPHYSIOLOGY | Facility: CLINIC | Age: 71
End: 2021-04-22

## 2021-06-01 ENCOUNTER — APPOINTMENT (OUTPATIENT)
Dept: ELECTROPHYSIOLOGY | Facility: CLINIC | Age: 71
End: 2021-06-01
Payer: MEDICARE

## 2021-06-01 ENCOUNTER — NON-APPOINTMENT (OUTPATIENT)
Age: 71
End: 2021-06-01

## 2021-06-01 PROCEDURE — 93296 REM INTERROG EVL PM/IDS: CPT

## 2021-06-01 PROCEDURE — 93294 REM INTERROG EVL PM/LDLS PM: CPT

## 2021-06-07 ENCOUNTER — RX RENEWAL (OUTPATIENT)
Age: 71
End: 2021-06-07

## 2021-07-06 ENCOUNTER — APPOINTMENT (OUTPATIENT)
Dept: INTERNAL MEDICINE | Facility: CLINIC | Age: 71
End: 2021-07-06
Payer: MEDICARE

## 2021-07-06 VITALS
SYSTOLIC BLOOD PRESSURE: 130 MMHG | HEART RATE: 73 BPM | TEMPERATURE: 98.2 F | BODY MASS INDEX: 27.12 KG/M2 | DIASTOLIC BLOOD PRESSURE: 62 MMHG | OXYGEN SATURATION: 97 % | WEIGHT: 158 LBS

## 2021-07-06 LAB — HBA1C MFR BLD HPLC: 6.5

## 2021-07-06 PROCEDURE — 99214 OFFICE O/P EST MOD 30 MIN: CPT | Mod: 25

## 2021-07-06 PROCEDURE — 83036 HEMOGLOBIN GLYCOSYLATED A1C: CPT | Mod: QW

## 2021-07-06 PROCEDURE — 99072 ADDL SUPL MATRL&STAF TM PHE: CPT

## 2021-07-06 RX ORDER — LANCETS
EACH MISCELLANEOUS
Qty: 100 | Refills: 6 | Status: ACTIVE | COMMUNITY
Start: 2021-02-12 | End: 1900-01-01

## 2021-07-06 NOTE — ASSESSMENT
[FreeTextEntry1] : 1. HTN Controlled \par 2. atrial tachycardia : controlled since ablation and PPM which is followed by cardiology , renew toprol and valsartan \par 3. Sciatica: to take her Mobic as needed , followed by ortho s/p 3 inj, to cont to walk to build up her strength \par 4.  DM  : On Januvia, previous   was 6.3, today = 6.5 to watch her diet and increase exer as tolerated ( heat in [place])  to renew her Januvia \par 5 PPM Per cardiol \par 6 Next visit is PE

## 2021-07-06 NOTE — PHYSICAL EXAM
[Normal] : affect was normal and insight and judgment were intact [de-identified] : no lestions , no ulcers

## 2021-07-06 NOTE — HISTORY OF PRESENT ILLNESS
[FreeTextEntry1] : Here for follow up  for her BP and DM   [de-identified] : Here for follow up. No problems.but still complains of pain in her right hip but much improved since seeing ortho and receiving 3 inj in it . Legs feel more tired than used. She is walking daily around a track. Her legs do not hurt , just feels as if everything is tired. Is leaving for [place] tomorrow. Received both covid vaccines No further palpitations

## 2021-07-07 LAB
ALBUMIN SERPL ELPH-MCNC: 3.9 G/DL
ALP BLD-CCNC: 79 U/L
ALT SERPL-CCNC: 31 U/L
ANION GAP SERPL CALC-SCNC: 12 MMOL/L
AST SERPL-CCNC: 28 U/L
BILIRUB SERPL-MCNC: 0.4 MG/DL
BUN SERPL-MCNC: 27 MG/DL
CALCIUM SERPL-MCNC: 9.7 MG/DL
CHLORIDE SERPL-SCNC: 102 MMOL/L
CO2 SERPL-SCNC: 23 MMOL/L
CREAT SERPL-MCNC: 1.19 MG/DL
GLUCOSE SERPL-MCNC: 191 MG/DL
POTASSIUM SERPL-SCNC: 4.2 MMOL/L
PROT SERPL-MCNC: 7 G/DL
SODIUM SERPL-SCNC: 137 MMOL/L

## 2021-08-31 ENCOUNTER — APPOINTMENT (OUTPATIENT)
Dept: ELECTROPHYSIOLOGY | Facility: CLINIC | Age: 71
End: 2021-08-31

## 2021-09-29 ENCOUNTER — NON-APPOINTMENT (OUTPATIENT)
Age: 71
End: 2021-09-29

## 2021-09-29 ENCOUNTER — APPOINTMENT (OUTPATIENT)
Dept: ELECTROPHYSIOLOGY | Facility: CLINIC | Age: 71
End: 2021-09-29
Payer: MEDICARE

## 2021-09-29 ENCOUNTER — RX RENEWAL (OUTPATIENT)
Age: 71
End: 2021-09-29

## 2021-09-29 PROCEDURE — 93296 REM INTERROG EVL PM/IDS: CPT

## 2021-09-29 PROCEDURE — 93294 REM INTERROG EVL PM/LDLS PM: CPT

## 2021-11-23 ENCOUNTER — APPOINTMENT (OUTPATIENT)
Dept: INTERNAL MEDICINE | Facility: CLINIC | Age: 71
End: 2021-11-23

## 2021-11-30 ENCOUNTER — APPOINTMENT (OUTPATIENT)
Dept: ELECTROPHYSIOLOGY | Facility: CLINIC | Age: 71
End: 2021-11-30

## 2022-01-01 ENCOUNTER — LABORATORY RESULT (OUTPATIENT)
Age: 72
End: 2022-01-01

## 2022-01-01 ENCOUNTER — NON-APPOINTMENT (OUTPATIENT)
Age: 72
End: 2022-01-01

## 2022-01-01 ENCOUNTER — APPOINTMENT (OUTPATIENT)
Dept: ELECTROPHYSIOLOGY | Facility: CLINIC | Age: 72
End: 2022-01-01
Payer: MEDICARE

## 2022-01-01 ENCOUNTER — RX RENEWAL (OUTPATIENT)
Age: 72
End: 2022-01-01

## 2022-01-01 ENCOUNTER — APPOINTMENT (OUTPATIENT)
Dept: INTERNAL MEDICINE | Facility: CLINIC | Age: 72
End: 2022-01-01

## 2022-01-01 ENCOUNTER — APPOINTMENT (OUTPATIENT)
Dept: ELECTROPHYSIOLOGY | Facility: CLINIC | Age: 72
End: 2022-01-01

## 2022-01-01 ENCOUNTER — EMERGENCY (EMERGENCY)
Facility: HOSPITAL | Age: 72
LOS: 0 days | Discharge: HOME | End: 2022-09-11
Attending: EMERGENCY MEDICINE

## 2022-01-01 ENCOUNTER — RESULT REVIEW (OUTPATIENT)
Age: 72
End: 2022-01-01

## 2022-01-01 VITALS
WEIGHT: 158 LBS | TEMPERATURE: 97.9 F | HEART RATE: 80 BPM | HEIGHT: 64 IN | BODY MASS INDEX: 26.98 KG/M2 | DIASTOLIC BLOOD PRESSURE: 69 MMHG | SYSTOLIC BLOOD PRESSURE: 144 MMHG | OXYGEN SATURATION: 97 %

## 2022-01-01 VITALS
OXYGEN SATURATION: 98 % | TEMPERATURE: 98 F | SYSTOLIC BLOOD PRESSURE: 126 MMHG | DIASTOLIC BLOOD PRESSURE: 79 MMHG | RESPIRATION RATE: 17 BRPM | HEART RATE: 70 BPM

## 2022-01-01 VITALS
DIASTOLIC BLOOD PRESSURE: 70 MMHG | TEMPERATURE: 98 F | HEIGHT: 65 IN | SYSTOLIC BLOOD PRESSURE: 122 MMHG | RESPIRATION RATE: 16 BRPM | WEIGHT: 158.07 LBS | OXYGEN SATURATION: 98 % | HEART RATE: 70 BPM

## 2022-01-01 DIAGNOSIS — Z79.84 LONG TERM (CURRENT) USE OF ORAL HYPOGLYCEMIC DRUGS: ICD-10-CM

## 2022-01-01 DIAGNOSIS — I10 ESSENTIAL (PRIMARY) HYPERTENSION: ICD-10-CM

## 2022-01-01 DIAGNOSIS — Z23 ENCOUNTER FOR IMMUNIZATION: ICD-10-CM

## 2022-01-01 DIAGNOSIS — E11.9 TYPE 2 DIABETES MELLITUS W/OUT COMPLICATIONS: ICD-10-CM

## 2022-01-01 DIAGNOSIS — M54.50 LOW BACK PAIN, UNSPECIFIED: ICD-10-CM

## 2022-01-01 DIAGNOSIS — E27.9 DISORDER OF ADRENAL GLAND, UNSPECIFIED: ICD-10-CM

## 2022-01-01 DIAGNOSIS — E11.9 TYPE 2 DIABETES MELLITUS WITHOUT COMPLICATIONS: ICD-10-CM

## 2022-01-01 DIAGNOSIS — Z79.82 LONG TERM (CURRENT) USE OF ASPIRIN: ICD-10-CM

## 2022-01-01 LAB
ALBUMIN SERPL ELPH-MCNC: 3.7 G/DL
ALP BLD-CCNC: 89 U/L
ALT SERPL-CCNC: 17 U/L
ANION GAP SERPL CALC-SCNC: 11 MMOL/L
APPEARANCE UR: CLEAR — SIGNIFICANT CHANGE UP
APPEARANCE: CLEAR
AST SERPL-CCNC: 21 U/L
BASOPHILS # BLD AUTO: 0.19 K/UL
BASOPHILS NFR BLD AUTO: 2.6 %
BILIRUB SERPL-MCNC: 0.3 MG/DL
BILIRUB UR-MCNC: NEGATIVE — SIGNIFICANT CHANGE UP
BILIRUBIN URINE: NEGATIVE
BLOOD URINE: ABNORMAL
BUN SERPL-MCNC: 15 MG/DL
CALCIUM SERPL-MCNC: 9.1 MG/DL
CHLORIDE SERPL-SCNC: 100 MMOL/L
CHOLEST SERPL-MCNC: 77 MG/DL
CO2 SERPL-SCNC: 24 MMOL/L
COLOR SPEC: YELLOW — SIGNIFICANT CHANGE UP
COLOR: YELLOW
CREAT SERPL-MCNC: 0.84 MG/DL
DIFF PNL FLD: NEGATIVE — SIGNIFICANT CHANGE UP
EGFR: 74 ML/MIN/1.73M2
EOSINOPHIL # BLD AUTO: 0.12 K/UL
EOSINOPHIL NFR BLD AUTO: 1.7 %
ESTIMATED AVERAGE GLUCOSE: 120 MG/DL
GLUCOSE QUALITATIVE U: NEGATIVE
GLUCOSE SERPL-MCNC: 154 MG/DL
GLUCOSE UR QL: NEGATIVE MG/DL — SIGNIFICANT CHANGE UP
HBA1C MFR BLD HPLC: 5.8 %
HCT VFR BLD CALC: 38.7 %
HDLC SERPL-MCNC: 19 MG/DL
HGB BLD-MCNC: 11.9 G/DL
KETONES UR-MCNC: NEGATIVE — SIGNIFICANT CHANGE UP
KETONES URINE: NEGATIVE
LDLC SERPL CALC-MCNC: 35 MG/DL
LEUKOCYTE ESTERASE UR-ACNC: NEGATIVE — SIGNIFICANT CHANGE UP
LEUKOCYTE ESTERASE URINE: NEGATIVE
LYMPHOCYTES # BLD AUTO: 2.22 K/UL
LYMPHOCYTES NFR BLD AUTO: 30.4 %
MAN DIFF?: NORMAL
MCHC RBC-ENTMCNC: 29.6 PG
MCHC RBC-ENTMCNC: 30.7 GM/DL
MCV RBC AUTO: 96.3 FL
MONOCYTES # BLD AUTO: 1.97 K/UL
MONOCYTES NFR BLD AUTO: 27 %
NEUTROPHILS # BLD AUTO: 2.73 K/UL
NEUTROPHILS NFR BLD AUTO: 37.4 %
NITRITE UR-MCNC: NEGATIVE — SIGNIFICANT CHANGE UP
NITRITE URINE: NEGATIVE
NONHDLC SERPL-MCNC: 57 MG/DL
PH UR: 8 — SIGNIFICANT CHANGE UP (ref 5–8)
PH URINE: 6
PLATELET # BLD AUTO: 205 K/UL
POTASSIUM SERPL-SCNC: 4.2 MMOL/L
PROT SERPL-MCNC: 7.3 G/DL
PROT UR-MCNC: NEGATIVE MG/DL — SIGNIFICANT CHANGE UP
PROTEIN URINE: NORMAL
RBC # BLD: 4.02 M/UL
RBC # FLD: 13.9 %
SODIUM SERPL-SCNC: 135 MMOL/L
SP GR SPEC: 1.01 — SIGNIFICANT CHANGE UP (ref 1.01–1.02)
SPECIFIC GRAVITY URINE: 1.02
TRIGL SERPL-MCNC: 111 MG/DL
TSH SERPL-ACNC: 0.51 UIU/ML
UROBILINOGEN FLD QL: NEGATIVE MG/DL — SIGNIFICANT CHANGE UP
UROBILINOGEN URINE: NORMAL
WBC # FLD AUTO: 7.3 K/UL

## 2022-01-01 PROCEDURE — 99284 EMERGENCY DEPT VISIT MOD MDM: CPT

## 2022-01-01 PROCEDURE — 93294 REM INTERROG EVL PM/LDLS PM: CPT

## 2022-01-01 PROCEDURE — G0008: CPT

## 2022-01-01 PROCEDURE — 99214 OFFICE O/P EST MOD 30 MIN: CPT | Mod: 25

## 2022-01-01 PROCEDURE — 93296 REM INTERROG EVL PM/IDS: CPT

## 2022-01-01 PROCEDURE — 90662 IIV NO PRSV INCREASED AG IM: CPT

## 2022-01-01 RX ORDER — CYCLOBENZAPRINE HYDROCHLORIDE 10 MG/1
5 TABLET, FILM COATED ORAL ONCE
Refills: 0 | Status: COMPLETED | OUTPATIENT
Start: 2022-01-01 | End: 2022-01-01

## 2022-01-01 RX ORDER — CYCLOBENZAPRINE HYDROCHLORIDE 10 MG/1
1 TABLET, FILM COATED ORAL
Qty: 15 | Refills: 0
Start: 2022-01-01 | End: 2022-01-01

## 2022-01-01 RX ORDER — KETOROLAC TROMETHAMINE 30 MG/ML
30 SYRINGE (ML) INJECTION ONCE
Refills: 0 | Status: DISCONTINUED | OUTPATIENT
Start: 2022-01-01 | End: 2022-01-01

## 2022-01-01 RX ORDER — METOPROLOL SUCCINATE 50 MG/1
50 TABLET, EXTENDED RELEASE ORAL
Qty: 90 | Refills: 0 | Status: ACTIVE | COMMUNITY
Start: 2019-04-02 | End: 1900-01-01

## 2022-01-01 RX ORDER — ATORVASTATIN CALCIUM 20 MG/1
20 TABLET, FILM COATED ORAL DAILY
Qty: 90 | Refills: 1 | Status: ACTIVE | COMMUNITY
Start: 2022-03-02 | End: 1900-01-01

## 2022-01-01 RX ORDER — SITAGLIPTIN 100 MG/1
100 TABLET, FILM COATED ORAL DAILY
Qty: 90 | Refills: 0 | Status: ACTIVE | COMMUNITY
Start: 2021-02-12 | End: 1900-01-01

## 2022-01-01 RX ORDER — IBUPROFEN 200 MG
1 TABLET ORAL
Qty: 20 | Refills: 0
Start: 2022-01-01 | End: 2022-01-01

## 2022-01-01 RX ORDER — VALSARTAN 320 MG/1
320 TABLET, COATED ORAL
Qty: 90 | Refills: 0 | Status: ACTIVE | COMMUNITY
Start: 2020-09-22 | End: 1900-01-01

## 2022-01-01 RX ADMIN — Medication 30 MILLIGRAM(S): at 12:45

## 2022-01-01 RX ADMIN — Medication 30 MILLIGRAM(S): at 12:01

## 2022-01-01 RX ADMIN — CYCLOBENZAPRINE HYDROCHLORIDE 5 MILLIGRAM(S): 10 TABLET, FILM COATED ORAL at 12:01

## 2022-01-03 ENCOUNTER — NON-APPOINTMENT (OUTPATIENT)
Age: 72
End: 2022-01-03

## 2022-01-03 ENCOUNTER — APPOINTMENT (OUTPATIENT)
Dept: ELECTROPHYSIOLOGY | Facility: CLINIC | Age: 72
End: 2022-01-03
Payer: MEDICARE

## 2022-01-03 PROCEDURE — 93294 REM INTERROG EVL PM/LDLS PM: CPT | Mod: NC

## 2022-01-03 PROCEDURE — 93296 REM INTERROG EVL PM/IDS: CPT | Mod: NC

## 2022-01-24 ENCOUNTER — RX RENEWAL (OUTPATIENT)
Age: 72
End: 2022-01-24

## 2022-02-28 ENCOUNTER — RX RENEWAL (OUTPATIENT)
Age: 72
End: 2022-02-28

## 2022-03-02 ENCOUNTER — APPOINTMENT (OUTPATIENT)
Dept: CARDIOLOGY | Facility: CLINIC | Age: 72
End: 2022-03-02
Payer: MEDICARE

## 2022-03-02 ENCOUNTER — APPOINTMENT (OUTPATIENT)
Dept: ELECTROPHYSIOLOGY | Facility: CLINIC | Age: 72
End: 2022-03-02
Payer: MEDICARE

## 2022-03-02 VITALS
WEIGHT: 164 LBS | OXYGEN SATURATION: 98 % | DIASTOLIC BLOOD PRESSURE: 78 MMHG | SYSTOLIC BLOOD PRESSURE: 147 MMHG | HEIGHT: 64 IN | BODY MASS INDEX: 28 KG/M2 | HEART RATE: 72 BPM

## 2022-03-02 PROCEDURE — 93000 ELECTROCARDIOGRAM COMPLETE: CPT

## 2022-03-02 PROCEDURE — 99214 OFFICE O/P EST MOD 30 MIN: CPT

## 2022-03-02 PROCEDURE — 93280 PM DEVICE PROGR EVAL DUAL: CPT

## 2022-03-02 RX ORDER — BLOOD SUGAR DIAGNOSTIC
STRIP MISCELLANEOUS
Qty: 100 | Refills: 1 | Status: ACTIVE | COMMUNITY
Start: 2021-02-12 | End: 1900-01-01

## 2022-03-02 RX ORDER — ASPIRIN ENTERIC COATED TABLETS 81 MG 81 MG/1
81 TABLET, DELAYED RELEASE ORAL DAILY
Qty: 90 | Refills: 3 | Status: ACTIVE | COMMUNITY
Start: 2022-03-02

## 2022-03-09 ENCOUNTER — APPOINTMENT (OUTPATIENT)
Dept: CV DIAGNOSITCS | Facility: HOSPITAL | Age: 72
End: 2022-03-09
Payer: MEDICARE

## 2022-03-09 ENCOUNTER — OUTPATIENT (OUTPATIENT)
Dept: OUTPATIENT SERVICES | Facility: HOSPITAL | Age: 72
LOS: 1 days | End: 2022-03-09

## 2022-03-09 DIAGNOSIS — I10 ESSENTIAL (PRIMARY) HYPERTENSION: ICD-10-CM

## 2022-03-09 DIAGNOSIS — G45.9 TRANSIENT CEREBRAL ISCHEMIC ATTACK, UNSPECIFIED: ICD-10-CM

## 2022-03-09 PROCEDURE — 93306 TTE W/DOPPLER COMPLETE: CPT | Mod: 26

## 2022-03-09 PROCEDURE — 93880 EXTRACRANIAL BILAT STUDY: CPT | Mod: 26

## 2022-03-10 ENCOUNTER — APPOINTMENT (OUTPATIENT)
Dept: INTERNAL MEDICINE | Facility: CLINIC | Age: 72
End: 2022-03-10
Payer: MEDICARE

## 2022-03-10 VITALS
BODY MASS INDEX: 27.31 KG/M2 | SYSTOLIC BLOOD PRESSURE: 120 MMHG | DIASTOLIC BLOOD PRESSURE: 74 MMHG | TEMPERATURE: 97.8 F | HEART RATE: 76 BPM | HEIGHT: 64 IN | OXYGEN SATURATION: 96 % | WEIGHT: 160 LBS

## 2022-03-10 DIAGNOSIS — R35.1 NOCTURIA: ICD-10-CM

## 2022-03-10 LAB
ALBUMIN SERPL ELPH-MCNC: 4.1 G/DL
ALP BLD-CCNC: 72 U/L
ALT SERPL-CCNC: 17 U/L
ANION GAP SERPL CALC-SCNC: 10 MMOL/L
AST SERPL-CCNC: 20 U/L
BASOPHILS # BLD AUTO: 0.03 K/UL
BASOPHILS NFR BLD AUTO: 0.5 %
BILIRUB SERPL-MCNC: 0.4 MG/DL
BUN SERPL-MCNC: 14 MG/DL
CALCIUM SERPL-MCNC: 10 MG/DL
CHLORIDE SERPL-SCNC: 104 MMOL/L
CHOLEST SERPL-MCNC: 117 MG/DL
CO2 SERPL-SCNC: 25 MMOL/L
CREAT SERPL-MCNC: 0.81 MG/DL
EGFR: 78 ML/MIN/1.73M2
EOSINOPHIL # BLD AUTO: 0.05 K/UL
EOSINOPHIL NFR BLD AUTO: 0.8 %
ESTIMATED AVERAGE GLUCOSE: 126 MG/DL
GLUCOSE SERPL-MCNC: 128 MG/DL
HBA1C MFR BLD HPLC: 6
HBA1C MFR BLD HPLC: 6 %
HCT VFR BLD CALC: 38.7 %
HDLC SERPL-MCNC: 44 MG/DL
HGB BLD-MCNC: 12.6 G/DL
IMM GRANULOCYTES NFR BLD AUTO: 0.3 %
LDLC SERPL CALC-MCNC: 57 MG/DL
LYMPHOCYTES # BLD AUTO: 2.12 K/UL
LYMPHOCYTES NFR BLD AUTO: 34.6 %
MAN DIFF?: NORMAL
MCHC RBC-ENTMCNC: 30.4 PG
MCHC RBC-ENTMCNC: 32.6 GM/DL
MCV RBC AUTO: 93.3 FL
MONOCYTES # BLD AUTO: 0.82 K/UL
MONOCYTES NFR BLD AUTO: 13.4 %
NEUTROPHILS # BLD AUTO: 3.09 K/UL
NEUTROPHILS NFR BLD AUTO: 50.4 %
NONHDLC SERPL-MCNC: 74 MG/DL
PLATELET # BLD AUTO: 243 K/UL
POTASSIUM SERPL-SCNC: 4.3 MMOL/L
PROT SERPL-MCNC: 7.3 G/DL
RBC # BLD: 4.15 M/UL
RBC # FLD: 12 %
SODIUM SERPL-SCNC: 138 MMOL/L
TRIGL SERPL-MCNC: 82 MG/DL
TSH SERPL-ACNC: 0.48 UIU/ML
WBC # FLD AUTO: 6.13 K/UL

## 2022-03-10 PROCEDURE — 83036 HEMOGLOBIN GLYCOSYLATED A1C: CPT | Mod: QW

## 2022-03-10 PROCEDURE — 99214 OFFICE O/P EST MOD 30 MIN: CPT

## 2022-03-10 NOTE — HISTORY OF PRESENT ILLNESS
[FreeTextEntry1] : Here for follow up  for  her routine medical health  [de-identified] : here for fllow up  , last seen in July. Was in [place] all this time and last week while there dev acute dizziness and bilateral leg weakness. Had a CT of the head which  was neg for acute changes but revealed chronic evidence for cva, seen by cardiol and PPM/ECG without acute changes, No neuro eval scheduled per appointments. Her symptoms have since resolved, lasting about 2 days

## 2022-03-10 NOTE — ASSESSMENT
[FreeTextEntry1] : 1. CVA: depite appropriate tx with asa, lipitor, bp control appears to have possible old cva to see neuro , echo normal EF with mild puln HTN, carotid with some plaque will need hdl to be < 80, check today . \par 1.5 HTN controlled \par 2. atrial tachycardia : controlled since ablation and PPM which is followed by cardiology , renew toprol and valsartan \par 3. Sciatica: to take her Mobic as needed , followed by ortho s/p 3 inj, to cont to walk to build up her strength , much improved \par 4.  DM  : On Januvia, previous   was last 6.5 up for  6.3 and today  6.0 to renew her Januvia \par 5 PPM Per cardiol \par 5.5 complains of hematuria, will send to : Dr Juan \par 6 Next visit is PE, not enough time today\par 7 HCM; mammo due , optho  < 1 year, flu vaccine today , to check old records for shingrix vaccine .

## 2022-04-04 ENCOUNTER — APPOINTMENT (OUTPATIENT)
Dept: ELECTROPHYSIOLOGY | Facility: CLINIC | Age: 72
End: 2022-04-04

## 2022-06-06 ENCOUNTER — APPOINTMENT (OUTPATIENT)
Dept: ELECTROPHYSIOLOGY | Facility: CLINIC | Age: 72
End: 2022-06-06

## 2022-06-07 ENCOUNTER — FORM ENCOUNTER (OUTPATIENT)
Age: 72
End: 2022-06-07

## 2022-06-16 ENCOUNTER — RX RENEWAL (OUTPATIENT)
Age: 72
End: 2022-06-16

## 2022-07-01 ENCOUNTER — NON-APPOINTMENT (OUTPATIENT)
Age: 72
End: 2022-07-01

## 2022-07-01 ENCOUNTER — APPOINTMENT (OUTPATIENT)
Dept: ELECTROPHYSIOLOGY | Facility: CLINIC | Age: 72
End: 2022-07-01

## 2022-07-01 PROCEDURE — 93294 REM INTERROG EVL PM/LDLS PM: CPT

## 2022-07-01 PROCEDURE — 93296 REM INTERROG EVL PM/IDS: CPT

## 2022-07-05 ENCOUNTER — RX RENEWAL (OUTPATIENT)
Age: 72
End: 2022-07-05

## 2022-07-05 ENCOUNTER — NON-APPOINTMENT (OUTPATIENT)
Age: 72
End: 2022-07-05

## 2022-08-12 ENCOUNTER — RX RENEWAL (OUTPATIENT)
Age: 72
End: 2022-08-12

## 2022-09-01 PROBLEM — E11.9 CONTROLLED TYPE 2 DIABETES MELLITUS WITHOUT COMPLICATION, WITHOUT LONG-TERM CURRENT USE OF INSULIN: Status: ACTIVE | Noted: 2021-02-12

## 2022-09-01 PROBLEM — Z23 ENCOUNTER FOR IMMUNIZATION: Status: ACTIVE | Noted: 2022-01-01

## 2022-09-01 PROBLEM — E27.9 ADRENAL ABNORMALITY: Status: ACTIVE | Noted: 2020-10-22

## 2022-09-01 NOTE — ASSESSMENT
[FreeTextEntry1] : 1. CVA: depite appropriate tx with asa, lipitor, bp control appears to have possible old cva to see neuro , echo normal EF with mild puln HTN, carotid with some plaque will need hdl to be < 80, check today again  . \par 1.5 HTN not as well  controlled  to follow \par 2. atrial tachycardia : controlled since ablation and PPM which is followed by cardiology , renew toprol and valsartan \par 3. Sciatica: to take her Mobic as needed , followed by ortho s/p 3 inj, to cont to walk to build up her strength , much improved needs to follow with ortho  and will try PT \par 4.  DM  : On Januvia, previous   was last 6.5 up for  6.3 and today  6.0 to renew her Januvia \par 5 PPM Per cardiol \par 5.5 complains of hematuria,  recommended  evaluation but never went will still need to go. states it was a long time ago \par 6 Next visit is PE, not enough time today again \par 7 HCM; mammo 3/22 one  , optho  < 1 year, flu vaccine  today  , to check old records for shingrix vaccine .

## 2022-09-01 NOTE — PHYSICAL EXAM
[No Focal Deficits] : no focal deficits [Normal Gait] : normal gait [Deep Tendon Reflexes (DTR)] : deep tendon reflexes were 2+ and symmetric [Normal] : normal gait, coordination grossly intact, no focal deficits and deep tendon reflexes were 2+ and symmetric [de-identified] : complains of lower back tend  [de-identified] : 5/5 bilateral strenght prox and periph ,

## 2022-09-01 NOTE — HISTORY OF PRESENT ILLNESS
[FreeTextEntry1] : Here for follow up  for  her routine medical health , was in [place] for several months  [de-identified] : here for fllow up  , last seen in March  . While in  [place]  in 3/22 dev acute dizziness and bilateral leg weakness. Had a CT of the head which  was neg for acute changes but revealed chronic evidence for cva, seen by cardiol and PPM/ECG without acute changes, No neuro eval scheduled per appointments. Her symptoms have since resolved, lasting about 2 days None since then . FEeling well but tired . Complains of pain in her lower back with bilateral weakness in her legs. Was  originally there all the time 6 mos ago  and now intermittent but has the pain in her back presently .

## 2022-09-01 NOTE — REVIEW OF SYSTEMS
[Fatigue] : fatigue [Negative] : Psychiatric [de-identified] : intact reflexes 5/5 bilateral and prox nad peripheral strenght

## 2022-09-11 NOTE — ED PROVIDER NOTE - PROGRESS NOTE DETAILS
Pt is feeling better, pain fully resolved after toradol. UA negative, and pt eager for d/c. Return precautions provided.

## 2022-09-11 NOTE — ED PROVIDER NOTE - CLINICAL SUMMARY MEDICAL DECISION MAKING FREE TEXT BOX
DDx: No dysuria or hematuria or CVA tenderness to suggest kidney stones.     Plan: UA. Toradol. Flexeril. Reassess.

## 2022-09-11 NOTE — ED PROVIDER NOTE - CARE PROVIDER_API CALL
Kemi Sandoval (DO)  Orthopaedic Surgery Surgery  [address] [facility], [address] [place] [address]  Phone: [phone]  Fax: [phone]  Follow Up Time: 4-6 Days

## 2022-09-11 NOTE — ED PROVIDER NOTE - OBJECTIVE STATEMENT
73 y/o F with a PMHx of HTN, sciatica, and DM presents to the ED c/o mid to lower left sided back pain x4 days. Patient reports that she takes aspirin for pain control.  Denies any numbness, tingling, fall, trauma, or injury. Denies any nausea, vomiting, diarrhea, fever, chest pain , or SOB. Denies dysuria or hematuria. Denies any hx of kidney stones. No bowel or bladder incontinence, no saddle anesthesia.

## 2022-09-11 NOTE — ED PROVIDER NOTE - PATIENT PORTAL LINK FT
You can access the FollowMyHealth Patient Portal offered by [facility] by registering at the following website: [email or web address]. By joining [employer]’s FollowMyHealth portal, you will also be able to view your health information using other applications (apps) compatible with our system.

## 2022-09-11 NOTE — ED PROVIDER NOTE - NEUROLOGICAL, MLM
Alert and oriented, no focal deficits, no motor or sensory deficits. Straight leg raises bilaterally.

## 2022-09-11 NOTE — ED PROVIDER NOTE - MUSCULOSKELETAL, MLM
Spine appears normal, range of motion is not limited, no muscle or joint tenderness. No midline neck or back pain. Some soreness along left para-spinal area.

## 2022-09-11 NOTE — ED ADULT NURSE NOTE - OBJECTIVE STATEMENT
AAOx3 pt c/o  upper to mid back pain radiating to ribs on left side  x 2 nights. Pt denies injury, pt denies SOB. Respiratory pattern unlabored, patient feels pain in ribs when exhaling. PMH: HTN, and pacemaker on left chest

## 2022-12-18 NOTE — ED ADULT NURSE NOTE - NS PRO AD NO ADVANCE DIRECTIVE
Came to bedside due to RN call on  and blood pressure 94/60. Assessed patient at bedside and asymptomatic. 500 cc bolus given and labs drawn. Was up to 120 in tachycardia in daytime. U/A was on 12/15. Will repeat U/A . Repeat vitals after bolus 109/70 and . Additional 500 cc bolus given and repeat vitals 107/73 and . Will continue to monitor closely. WBC noted to be 12.6 from 6.7 (? Hemoconcentration) as labs drawn prior to bolus and creat 1.03 from 0.77. Hgb stable 11.6 to 11.2. Chest xray in AM ordered.   No

## 2023-01-01 ENCOUNTER — APPOINTMENT (OUTPATIENT)
Dept: ELECTROPHYSIOLOGY | Facility: CLINIC | Age: 73
End: 2023-01-01

## 2023-01-01 ENCOUNTER — FORM ENCOUNTER (OUTPATIENT)
Age: 73
End: 2023-01-01

## 2023-01-01 ENCOUNTER — APPOINTMENT (OUTPATIENT)
Dept: ELECTROPHYSIOLOGY | Facility: CLINIC | Age: 73
End: 2023-01-01
Payer: MEDICARE

## 2023-01-01 ENCOUNTER — TRANSCRIPTION ENCOUNTER (OUTPATIENT)
Age: 73
End: 2023-01-01

## 2023-01-01 ENCOUNTER — INPATIENT (INPATIENT)
Facility: HOSPITAL | Age: 73
LOS: 6 days | Discharge: HOME | End: 2023-06-14
Attending: INTERNAL MEDICINE | Admitting: INTERNAL MEDICINE
Payer: MEDICARE

## 2023-01-01 ENCOUNTER — APPOINTMENT (OUTPATIENT)
Dept: GASTROENTEROLOGY | Facility: CLINIC | Age: 73
End: 2023-01-01

## 2023-01-01 ENCOUNTER — NON-APPOINTMENT (OUTPATIENT)
Age: 73
End: 2023-01-01

## 2023-01-01 ENCOUNTER — APPOINTMENT (OUTPATIENT)
Dept: CV DIAGNOSITCS | Facility: HOSPITAL | Age: 73
End: 2023-01-01

## 2023-01-01 ENCOUNTER — OUTPATIENT (OUTPATIENT)
Dept: OUTPATIENT SERVICES | Facility: HOSPITAL | Age: 73
LOS: 1 days | End: 2023-01-01
Payer: MEDICARE

## 2023-01-01 ENCOUNTER — APPOINTMENT (OUTPATIENT)
Dept: CARDIOLOGY | Facility: CLINIC | Age: 73
End: 2023-01-01
Payer: MEDICARE

## 2023-01-01 ENCOUNTER — RX RENEWAL (OUTPATIENT)
Age: 73
End: 2023-01-01

## 2023-01-01 ENCOUNTER — APPOINTMENT (OUTPATIENT)
Dept: GASTROENTEROLOGY | Facility: CLINIC | Age: 73
End: 2023-01-01
Payer: MEDICARE

## 2023-01-01 VITALS
SYSTOLIC BLOOD PRESSURE: 126 MMHG | HEART RATE: 86 BPM | OXYGEN SATURATION: 98 % | DIASTOLIC BLOOD PRESSURE: 78 MMHG | RESPIRATION RATE: 18 BRPM | TEMPERATURE: 99 F

## 2023-01-01 VITALS
TEMPERATURE: 98 F | OXYGEN SATURATION: 99 % | BODY MASS INDEX: 25.44 KG/M2 | SYSTOLIC BLOOD PRESSURE: 124 MMHG | WEIGHT: 149 LBS | HEART RATE: 76 BPM | HEIGHT: 64 IN | DIASTOLIC BLOOD PRESSURE: 74 MMHG

## 2023-01-01 VITALS
DIASTOLIC BLOOD PRESSURE: 80 MMHG | HEART RATE: 71 BPM | OXYGEN SATURATION: 97 % | TEMPERATURE: 98 F | RESPIRATION RATE: 12 BRPM | SYSTOLIC BLOOD PRESSURE: 133 MMHG | HEIGHT: 64 IN

## 2023-01-01 VITALS
RESPIRATION RATE: 18 BRPM | TEMPERATURE: 100 F | SYSTOLIC BLOOD PRESSURE: 115 MMHG | HEART RATE: 83 BPM | DIASTOLIC BLOOD PRESSURE: 60 MMHG

## 2023-01-01 DIAGNOSIS — R06.02 SHORTNESS OF BREATH: ICD-10-CM

## 2023-01-01 DIAGNOSIS — I10 ESSENTIAL (PRIMARY) HYPERTENSION: ICD-10-CM

## 2023-01-01 DIAGNOSIS — R10.9 UNSPECIFIED ABDOMINAL PAIN: ICD-10-CM

## 2023-01-01 DIAGNOSIS — I47.1 SUPRAVENTRICULAR TACHYCARDIA: ICD-10-CM

## 2023-01-01 DIAGNOSIS — Z95.0 PRESENCE OF CARDIAC PACEMAKER: ICD-10-CM

## 2023-01-01 DIAGNOSIS — E87.1 HYPO-OSMOLALITY AND HYPONATREMIA: ICD-10-CM

## 2023-01-01 DIAGNOSIS — Z79.899 OTHER LONG TERM (CURRENT) DRUG THERAPY: ICD-10-CM

## 2023-01-01 DIAGNOSIS — I44.30 UNSPECIFIED ATRIOVENTRICULAR BLOCK: ICD-10-CM

## 2023-01-01 DIAGNOSIS — Z29.9 ENCOUNTER FOR PROPHYLACTIC MEASURES, UNSPECIFIED: ICD-10-CM

## 2023-01-01 DIAGNOSIS — R19.00 INTRA-ABDOMINAL AND PELVIC SWELLING, MASS AND LUMP, UNSPECIFIED SITE: ICD-10-CM

## 2023-01-01 DIAGNOSIS — I44.2 ATRIOVENTRICULAR BLOCK, COMPLETE: ICD-10-CM

## 2023-01-01 DIAGNOSIS — E11.65 TYPE 2 DIABETES MELLITUS WITH HYPERGLYCEMIA: ICD-10-CM

## 2023-01-01 DIAGNOSIS — R00.2 PALPITATIONS: ICD-10-CM

## 2023-01-01 DIAGNOSIS — Z86.73 PERSONAL HISTORY OF TRANSIENT ISCHEMIC ATTACK (TIA), AND CEREBRAL INFARCTION W/OUT RESIDUAL DEFICITS: ICD-10-CM

## 2023-01-01 DIAGNOSIS — R10.13 EPIGASTRIC PAIN: ICD-10-CM

## 2023-01-01 DIAGNOSIS — I47.29 OTHER VENTRICULAR TACHYCARDIA: ICD-10-CM

## 2023-01-01 DIAGNOSIS — G45.9 TRANSIENT CEREBRAL ISCHEMIC ATTACK, UNSPECIFIED: ICD-10-CM

## 2023-01-01 LAB
% ALBUMIN: 33 % — SIGNIFICANT CHANGE UP
% ALPHA 1: 6.9 % — SIGNIFICANT CHANGE UP
% ALPHA 2: 11.2 % — SIGNIFICANT CHANGE UP
% BETA: 14.1 % — SIGNIFICANT CHANGE UP
% GAMMA, URINE: SIGNIFICANT CHANGE UP
% GAMMA: 34.8 % — SIGNIFICANT CHANGE UP
A1C WITH ESTIMATED AVERAGE GLUCOSE RESULT: 6.1 % — HIGH (ref 4–5.6)
A1C WITH ESTIMATED AVERAGE GLUCOSE RESULT: 6.4 % — HIGH (ref 4–5.6)
ALBUMIN 24H MFR UR ELPH: PRESENT
ALBUMIN SERPL ELPH-MCNC: 2.24 G/DL — LOW (ref 3.3–4.4)
ALBUMIN SERPL ELPH-MCNC: 3 G/DL — LOW (ref 3.3–5)
ALBUMIN/GLOB SERPL ELPH: 0.5 RATIO — SIGNIFICANT CHANGE UP
ALP SERPL-CCNC: 80 U/L — SIGNIFICANT CHANGE UP (ref 40–120)
ALPHA1 GLOB 24H MFR UR ELPH: SIGNIFICANT CHANGE UP
ALPHA1 GLOB SERPL ELPH-MCNC: 0.47 G/DL — HIGH (ref 0.1–0.3)
ALPHA2 GLOB 24H MFR UR ELPH: SIGNIFICANT CHANGE UP
ALPHA2 GLOB SERPL ELPH-MCNC: 0.8 G/DL — SIGNIFICANT CHANGE UP (ref 0.6–1)
ALT FLD-CCNC: 27 U/L — SIGNIFICANT CHANGE UP (ref 4–33)
ANION GAP SERPL CALC-SCNC: 10 MMOL/L — SIGNIFICANT CHANGE UP (ref 7–14)
ANION GAP SERPL CALC-SCNC: 11 MMOL/L — SIGNIFICANT CHANGE UP (ref 7–14)
ANION GAP SERPL CALC-SCNC: 11 MMOL/L — SIGNIFICANT CHANGE UP (ref 7–14)
ANION GAP SERPL CALC-SCNC: 13 MMOL/L — SIGNIFICANT CHANGE UP (ref 7–14)
ANION GAP SERPL CALC-SCNC: 8 MMOL/L — SIGNIFICANT CHANGE UP (ref 7–14)
ANION GAP SERPL CALC-SCNC: 8 MMOL/L — SIGNIFICANT CHANGE UP (ref 7–14)
ANION GAP SERPL CALC-SCNC: 9 MMOL/L — SIGNIFICANT CHANGE UP (ref 7–14)
ANISOCYTOSIS BLD QL: SLIGHT — SIGNIFICANT CHANGE UP
APTT BLD: 26.8 SEC — LOW (ref 27–36.3)
APTT BLD: 28.5 SEC — SIGNIFICANT CHANGE UP (ref 27–36.3)
AST SERPL-CCNC: 49 U/L — HIGH (ref 4–32)
B-GLOBULIN 24H MFR UR ELPH: SIGNIFICANT CHANGE UP
B-GLOBULIN SERPL ELPH-MCNC: 0.96 G/DL — SIGNIFICANT CHANGE UP (ref 0.6–1.1)
BASE EXCESS BLDV CALC-SCNC: 1.3 MMOL/L — SIGNIFICANT CHANGE UP (ref -2–3)
BASOPHILS # BLD AUTO: 0 K/UL — SIGNIFICANT CHANGE UP (ref 0–0.2)
BASOPHILS # BLD AUTO: 0.03 K/UL — SIGNIFICANT CHANGE UP (ref 0–0.2)
BASOPHILS NFR BLD AUTO: 0 % — SIGNIFICANT CHANGE UP (ref 0–2)
BASOPHILS NFR BLD AUTO: 0.3 % — SIGNIFICANT CHANGE UP (ref 0–2)
BILIRUB SERPL-MCNC: 0.6 MG/DL — SIGNIFICANT CHANGE UP (ref 0.2–1.2)
BLOOD GAS VENOUS COMPREHENSIVE RESULT: SIGNIFICANT CHANGE UP
BUN SERPL-MCNC: 10 MG/DL — SIGNIFICANT CHANGE UP (ref 7–23)
BUN SERPL-MCNC: 11 MG/DL — SIGNIFICANT CHANGE UP (ref 7–23)
BUN SERPL-MCNC: 12 MG/DL — SIGNIFICANT CHANGE UP (ref 7–23)
BUN SERPL-MCNC: 12 MG/DL — SIGNIFICANT CHANGE UP (ref 7–23)
BUN SERPL-MCNC: 7 MG/DL — SIGNIFICANT CHANGE UP (ref 7–23)
BUN SERPL-MCNC: 9 MG/DL — SIGNIFICANT CHANGE UP (ref 7–23)
CALCIUM SERPL-MCNC: 8.4 MG/DL — SIGNIFICANT CHANGE UP (ref 8.4–10.5)
CALCIUM SERPL-MCNC: 8.8 MG/DL — SIGNIFICANT CHANGE UP (ref 8.4–10.5)
CALCIUM SERPL-MCNC: 8.9 MG/DL — SIGNIFICANT CHANGE UP (ref 8.4–10.5)
CALCIUM SERPL-MCNC: 9 MG/DL — SIGNIFICANT CHANGE UP (ref 8.4–10.5)
CALCIUM SERPL-MCNC: 9 MG/DL — SIGNIFICANT CHANGE UP (ref 8.4–10.5)
CALCIUM SERPL-MCNC: 9.1 MG/DL — SIGNIFICANT CHANGE UP (ref 8.4–10.5)
CHLORIDE BLDV-SCNC: 98 MMOL/L — SIGNIFICANT CHANGE UP (ref 96–108)
CHLORIDE SERPL-SCNC: 91 MMOL/L — LOW (ref 98–107)
CHLORIDE SERPL-SCNC: 93 MMOL/L — LOW (ref 98–107)
CHLORIDE SERPL-SCNC: 94 MMOL/L — LOW (ref 98–107)
CHLORIDE SERPL-SCNC: 95 MMOL/L — LOW (ref 98–107)
CHLORIDE SERPL-SCNC: 96 MMOL/L — LOW (ref 98–107)
CHLORIDE SERPL-SCNC: 96 MMOL/L — LOW (ref 98–107)
CHOLEST SERPL-MCNC: 75 MG/DL — SIGNIFICANT CHANGE UP
CO2 BLDV-SCNC: 26 MMOL/L — SIGNIFICANT CHANGE UP (ref 22–26)
CO2 SERPL-SCNC: 21 MMOL/L — LOW (ref 22–31)
CO2 SERPL-SCNC: 21 MMOL/L — LOW (ref 22–31)
CO2 SERPL-SCNC: 22 MMOL/L — SIGNIFICANT CHANGE UP (ref 22–31)
CO2 SERPL-SCNC: 22 MMOL/L — SIGNIFICANT CHANGE UP (ref 22–31)
CO2 SERPL-SCNC: 23 MMOL/L — SIGNIFICANT CHANGE UP (ref 22–31)
CO2 SERPL-SCNC: 24 MMOL/L — SIGNIFICANT CHANGE UP (ref 22–31)
CO2 SERPL-SCNC: 25 MMOL/L — SIGNIFICANT CHANGE UP (ref 22–31)
CREAT SERPL-MCNC: 0.71 MG/DL — SIGNIFICANT CHANGE UP (ref 0.5–1.3)
CREAT SERPL-MCNC: 0.73 MG/DL — SIGNIFICANT CHANGE UP (ref 0.5–1.3)
CREAT SERPL-MCNC: 0.75 MG/DL — SIGNIFICANT CHANGE UP (ref 0.5–1.3)
CREAT SERPL-MCNC: 0.77 MG/DL — SIGNIFICANT CHANGE UP (ref 0.5–1.3)
CREAT SERPL-MCNC: 0.78 MG/DL — SIGNIFICANT CHANGE UP (ref 0.5–1.3)
CREAT SERPL-MCNC: 0.79 MG/DL — SIGNIFICANT CHANGE UP (ref 0.5–1.3)
CREAT SERPL-MCNC: 0.79 MG/DL — SIGNIFICANT CHANGE UP (ref 0.5–1.3)
CREAT SERPL-MCNC: 0.8 MG/DL — SIGNIFICANT CHANGE UP (ref 0.5–1.3)
CREAT SERPL-MCNC: 0.84 MG/DL — SIGNIFICANT CHANGE UP (ref 0.5–1.3)
CREAT SERPL-MCNC: 0.86 MG/DL — SIGNIFICANT CHANGE UP (ref 0.5–1.3)
EGFR: 71 ML/MIN/1.73M2 — SIGNIFICANT CHANGE UP
EGFR: 73 ML/MIN/1.73M2 — SIGNIFICANT CHANGE UP
EGFR: 78 ML/MIN/1.73M2 — SIGNIFICANT CHANGE UP
EGFR: 79 ML/MIN/1.73M2 — SIGNIFICANT CHANGE UP
EGFR: 79 ML/MIN/1.73M2 — SIGNIFICANT CHANGE UP
EGFR: 80 ML/MIN/1.73M2 — SIGNIFICANT CHANGE UP
EGFR: 81 ML/MIN/1.73M2 — SIGNIFICANT CHANGE UP
EGFR: 84 ML/MIN/1.73M2 — SIGNIFICANT CHANGE UP
EGFR: 87 ML/MIN/1.73M2 — SIGNIFICANT CHANGE UP
EGFR: 90 ML/MIN/1.73M2 — SIGNIFICANT CHANGE UP
EOSINOPHIL # BLD AUTO: 0 K/UL — SIGNIFICANT CHANGE UP (ref 0–0.5)
EOSINOPHIL # BLD AUTO: 0.04 K/UL — SIGNIFICANT CHANGE UP (ref 0–0.5)
EOSINOPHIL NFR BLD AUTO: 0 % — SIGNIFICANT CHANGE UP (ref 0–6)
EOSINOPHIL NFR BLD AUTO: 0.4 % — SIGNIFICANT CHANGE UP (ref 0–6)
ESTIMATED AVERAGE GLUCOSE: 128 — SIGNIFICANT CHANGE UP
ESTIMATED AVERAGE GLUCOSE: 137 — SIGNIFICANT CHANGE UP
GAMMA GLOBULIN: 2.37 G/DL — HIGH (ref 0.7–1.7)
GAS PNL BLDV: 127 MMOL/L — LOW (ref 136–145)
GIANT PLATELETS BLD QL SMEAR: PRESENT — SIGNIFICANT CHANGE UP
GLUCOSE BLDC GLUCOMTR-MCNC: 123 MG/DL — HIGH (ref 70–99)
GLUCOSE BLDC GLUCOMTR-MCNC: 125 MG/DL — HIGH (ref 70–99)
GLUCOSE BLDC GLUCOMTR-MCNC: 135 MG/DL — HIGH (ref 70–99)
GLUCOSE BLDC GLUCOMTR-MCNC: 139 MG/DL — HIGH (ref 70–99)
GLUCOSE BLDC GLUCOMTR-MCNC: 140 MG/DL — HIGH (ref 70–99)
GLUCOSE BLDC GLUCOMTR-MCNC: 147 MG/DL — HIGH (ref 70–99)
GLUCOSE BLDC GLUCOMTR-MCNC: 162 MG/DL — HIGH (ref 70–99)
GLUCOSE BLDC GLUCOMTR-MCNC: 163 MG/DL — HIGH (ref 70–99)
GLUCOSE BLDC GLUCOMTR-MCNC: 163 MG/DL — HIGH (ref 70–99)
GLUCOSE BLDC GLUCOMTR-MCNC: 168 MG/DL — HIGH (ref 70–99)
GLUCOSE BLDC GLUCOMTR-MCNC: 171 MG/DL — HIGH (ref 70–99)
GLUCOSE BLDC GLUCOMTR-MCNC: 173 MG/DL — HIGH (ref 70–99)
GLUCOSE BLDC GLUCOMTR-MCNC: 178 MG/DL — HIGH (ref 70–99)
GLUCOSE BLDC GLUCOMTR-MCNC: 179 MG/DL — HIGH (ref 70–99)
GLUCOSE BLDC GLUCOMTR-MCNC: 190 MG/DL — HIGH (ref 70–99)
GLUCOSE BLDC GLUCOMTR-MCNC: 191 MG/DL — HIGH (ref 70–99)
GLUCOSE BLDC GLUCOMTR-MCNC: 193 MG/DL — HIGH (ref 70–99)
GLUCOSE BLDC GLUCOMTR-MCNC: 198 MG/DL — HIGH (ref 70–99)
GLUCOSE BLDC GLUCOMTR-MCNC: 205 MG/DL — HIGH (ref 70–99)
GLUCOSE BLDC GLUCOMTR-MCNC: 205 MG/DL — HIGH (ref 70–99)
GLUCOSE BLDC GLUCOMTR-MCNC: 207 MG/DL — HIGH (ref 70–99)
GLUCOSE BLDC GLUCOMTR-MCNC: 208 MG/DL — HIGH (ref 70–99)
GLUCOSE BLDC GLUCOMTR-MCNC: 216 MG/DL — HIGH (ref 70–99)
GLUCOSE BLDC GLUCOMTR-MCNC: 228 MG/DL — HIGH (ref 70–99)
GLUCOSE BLDC GLUCOMTR-MCNC: 235 MG/DL — HIGH (ref 70–99)
GLUCOSE BLDC GLUCOMTR-MCNC: 291 MG/DL — HIGH (ref 70–99)
GLUCOSE BLDC GLUCOMTR-MCNC: 311 MG/DL — HIGH (ref 70–99)
GLUCOSE BLDV-MCNC: 203 MG/DL — HIGH (ref 70–99)
GLUCOSE SERPL-MCNC: 138 MG/DL — HIGH (ref 70–99)
GLUCOSE SERPL-MCNC: 148 MG/DL — HIGH (ref 70–99)
GLUCOSE SERPL-MCNC: 152 MG/DL — HIGH (ref 70–99)
GLUCOSE SERPL-MCNC: 154 MG/DL — HIGH (ref 70–99)
GLUCOSE SERPL-MCNC: 159 MG/DL — HIGH (ref 70–99)
GLUCOSE SERPL-MCNC: 163 MG/DL — HIGH (ref 70–99)
GLUCOSE SERPL-MCNC: 170 MG/DL — HIGH (ref 70–99)
GLUCOSE SERPL-MCNC: 178 MG/DL — HIGH (ref 70–99)
GLUCOSE SERPL-MCNC: 186 MG/DL — HIGH (ref 70–99)
GLUCOSE SERPL-MCNC: 304 MG/DL — HIGH (ref 70–99)
GLUCOSE SERPL-MCNC: 324 MG/DL — HIGH (ref 70–99)
HCO3 BLDV-SCNC: 25 MMOL/L — SIGNIFICANT CHANGE UP (ref 22–29)
HCT VFR BLD CALC: 34.6 % — SIGNIFICANT CHANGE UP (ref 34.5–45)
HCT VFR BLD CALC: 35.2 % — SIGNIFICANT CHANGE UP (ref 34.5–45)
HCT VFR BLD CALC: 35.9 % — SIGNIFICANT CHANGE UP (ref 34.5–45)
HCT VFR BLD CALC: 36.5 % — SIGNIFICANT CHANGE UP (ref 34.5–45)
HCT VFR BLDA CALC: 34 % — LOW (ref 34.5–46.5)
HCV AB S/CO SERPL IA: 0.13 S/CO — SIGNIFICANT CHANGE UP (ref 0–0.99)
HCV AB SERPL-IMP: SIGNIFICANT CHANGE UP
HDLC SERPL-MCNC: 11 MG/DL — LOW
HGB BLD CALC-MCNC: 11.3 G/DL — LOW (ref 11.7–16.1)
HGB BLD-MCNC: 11 G/DL — LOW (ref 11.5–15.5)
HGB BLD-MCNC: 11.1 G/DL — LOW (ref 11.5–15.5)
HGB BLD-MCNC: 11.1 G/DL — LOW (ref 11.5–15.5)
HGB BLD-MCNC: 11.6 G/DL — SIGNIFICANT CHANGE UP (ref 11.5–15.5)
IANC: 3.28 K/UL — SIGNIFICANT CHANGE UP (ref 1.8–7.4)
IANC: 3.74 K/UL — SIGNIFICANT CHANGE UP (ref 1.8–7.4)
IGA FLD-MCNC: 268 MG/DL — SIGNIFICANT CHANGE UP (ref 84–499)
IGG FLD-MCNC: 2053 MG/DL — HIGH (ref 610–1660)
IGM SERPL-MCNC: 183 MG/DL — SIGNIFICANT CHANGE UP (ref 35–242)
IMM GRANULOCYTES NFR BLD AUTO: 0.5 % — SIGNIFICANT CHANGE UP (ref 0–0.9)
INR BLD: 1.39 RATIO — HIGH (ref 0.88–1.16)
INR BLD: 1.44 RATIO — HIGH (ref 0.88–1.16)
INTERPRETATION 24H UR IFE-IMP: SIGNIFICANT CHANGE UP
INTERPRETATION 24H UR IFE-IMP: SIGNIFICANT CHANGE UP
INTERPRETATION SERPL IFE-IMP: SIGNIFICANT CHANGE UP
KAPPA LC SER QL IFE: 8.85 MG/DL — HIGH (ref 0.33–1.94)
KAPPA LC SER QL IFE: 8.85 MG/DL — HIGH (ref 0.33–1.94)
KAPPA/LAMBDA FREE LIGHT CHAIN RATIO, SERUM: 1.45 RATIO — SIGNIFICANT CHANGE UP (ref 0.26–1.65)
KAPPA/LAMBDA FREE LIGHT CHAIN RATIO, SERUM: 1.45 RATIO — SIGNIFICANT CHANGE UP (ref 0.26–1.65)
LACTATE BLDV-MCNC: 1.4 MMOL/L — SIGNIFICANT CHANGE UP (ref 0.5–2)
LAMBDA LC SER QL IFE: 6.12 MG/DL — HIGH (ref 0.57–2.63)
LAMBDA LC SER QL IFE: 6.12 MG/DL — HIGH (ref 0.57–2.63)
LIPASE FLD-CCNC: SIGNIFICANT CHANGE UP
LIPID PNL WITH DIRECT LDL SERPL: 35 MG/DL — SIGNIFICANT CHANGE UP
LYMPHOCYTES # BLD AUTO: 1.31 K/UL — SIGNIFICANT CHANGE UP (ref 1–3.3)
LYMPHOCYTES # BLD AUTO: 1.97 K/UL — SIGNIFICANT CHANGE UP (ref 1–3.3)
LYMPHOCYTES # BLD AUTO: 12.3 % — LOW (ref 13–44)
LYMPHOCYTES # BLD AUTO: 20.8 % — SIGNIFICANT CHANGE UP (ref 13–44)
M PROTEIN 24H UR ELPH-MRATE: SIGNIFICANT CHANGE UP
MACROCYTES BLD QL: SLIGHT — SIGNIFICANT CHANGE UP
MAGNESIUM SERPL-MCNC: 1.7 MG/DL — SIGNIFICANT CHANGE UP (ref 1.6–2.6)
MAGNESIUM SERPL-MCNC: 1.8 MG/DL — SIGNIFICANT CHANGE UP (ref 1.6–2.6)
MAGNESIUM SERPL-MCNC: 1.9 MG/DL — SIGNIFICANT CHANGE UP (ref 1.6–2.6)
MAGNESIUM SERPL-MCNC: 1.9 MG/DL — SIGNIFICANT CHANGE UP (ref 1.6–2.6)
MCHC RBC-ENTMCNC: 26.5 PG — LOW (ref 27–34)
MCHC RBC-ENTMCNC: 26.7 PG — LOW (ref 27–34)
MCHC RBC-ENTMCNC: 26.8 PG — LOW (ref 27–34)
MCHC RBC-ENTMCNC: 26.9 PG — LOW (ref 27–34)
MCHC RBC-ENTMCNC: 30.9 GM/DL — LOW (ref 32–36)
MCHC RBC-ENTMCNC: 31.3 GM/DL — LOW (ref 32–36)
MCHC RBC-ENTMCNC: 31.8 GM/DL — LOW (ref 32–36)
MCHC RBC-ENTMCNC: 32.1 GM/DL — SIGNIFICANT CHANGE UP (ref 32–36)
MCV RBC AUTO: 83.8 FL — SIGNIFICANT CHANGE UP (ref 80–100)
MCV RBC AUTO: 83.9 FL — SIGNIFICANT CHANGE UP (ref 80–100)
MCV RBC AUTO: 85.7 FL — SIGNIFICANT CHANGE UP (ref 80–100)
MCV RBC AUTO: 85.9 FL — SIGNIFICANT CHANGE UP (ref 80–100)
MONOCYTES # BLD AUTO: 3.55 K/UL — HIGH (ref 0–0.9)
MONOCYTES # BLD AUTO: 4.09 K/UL — HIGH (ref 0–0.9)
MONOCYTES NFR BLD AUTO: 33.3 % — HIGH (ref 2–14)
MONOCYTES NFR BLD AUTO: 43.2 % — HIGH (ref 2–14)
NEUTROPHILS # BLD AUTO: 3.28 K/UL — SIGNIFICANT CHANGE UP (ref 1.8–7.4)
NEUTROPHILS # BLD AUTO: 5.15 K/UL — SIGNIFICANT CHANGE UP (ref 1.8–7.4)
NEUTROPHILS NFR BLD AUTO: 34.8 % — LOW (ref 43–77)
NEUTROPHILS NFR BLD AUTO: 48.3 % — SIGNIFICANT CHANGE UP (ref 43–77)
NON HDL CHOLESTEROL: 64 MG/DL — SIGNIFICANT CHANGE UP
NON-GYNECOLOGICAL CYTOLOGY STUDY: SIGNIFICANT CHANGE UP
NRBC # BLD: 0 /100 WBCS — SIGNIFICANT CHANGE UP (ref 0–0)
NRBC # BLD: 1 /100 — HIGH (ref 0–0)
NRBC # FLD: 0 K/UL — SIGNIFICANT CHANGE UP (ref 0–0)
NT-PROBNP SERPL-SCNC: 486 PG/ML — HIGH
OSMOLALITY SERPL: 276 MOSM/KG — SIGNIFICANT CHANGE UP (ref 275–295)
OSMOLALITY SERPL: 290 MOSM/KG — SIGNIFICANT CHANGE UP (ref 275–295)
OSMOLALITY UR: 484 MOSM/KG — SIGNIFICANT CHANGE UP (ref 50–1200)
PCO2 BLDV: 35 MMHG — LOW (ref 39–52)
PH BLDV: 7.46 — HIGH (ref 7.32–7.43)
PHOSPHATE SERPL-MCNC: 3.1 MG/DL — SIGNIFICANT CHANGE UP (ref 2.5–4.5)
PHOSPHATE SERPL-MCNC: 3.3 MG/DL — SIGNIFICANT CHANGE UP (ref 2.5–4.5)
PHOSPHATE SERPL-MCNC: 3.4 MG/DL — SIGNIFICANT CHANGE UP (ref 2.5–4.5)
PHOSPHATE SERPL-MCNC: 3.4 MG/DL — SIGNIFICANT CHANGE UP (ref 2.5–4.5)
PHOSPHATE SERPL-MCNC: 3.6 MG/DL — SIGNIFICANT CHANGE UP (ref 2.5–4.5)
PLAT MORPH BLD: NORMAL — SIGNIFICANT CHANGE UP
PLATELET # BLD AUTO: 186 K/UL — SIGNIFICANT CHANGE UP (ref 150–400)
PLATELET # BLD AUTO: 188 K/UL — SIGNIFICANT CHANGE UP (ref 150–400)
PLATELET # BLD AUTO: 199 K/UL — SIGNIFICANT CHANGE UP (ref 150–400)
PLATELET # BLD AUTO: 204 K/UL — SIGNIFICANT CHANGE UP (ref 150–400)
PLATELET COUNT - ESTIMATE: NORMAL — SIGNIFICANT CHANGE UP
PO2 BLDV: 169 MMHG — HIGH (ref 25–45)
POLYCHROMASIA BLD QL SMEAR: SLIGHT — SIGNIFICANT CHANGE UP
POTASSIUM BLDV-SCNC: 4 MMOL/L — SIGNIFICANT CHANGE UP (ref 3.5–5.1)
POTASSIUM SERPL-MCNC: 3.8 MMOL/L — SIGNIFICANT CHANGE UP (ref 3.5–5.3)
POTASSIUM SERPL-MCNC: 3.9 MMOL/L — SIGNIFICANT CHANGE UP (ref 3.5–5.3)
POTASSIUM SERPL-MCNC: 3.9 MMOL/L — SIGNIFICANT CHANGE UP (ref 3.5–5.3)
POTASSIUM SERPL-MCNC: 4 MMOL/L — SIGNIFICANT CHANGE UP (ref 3.5–5.3)
POTASSIUM SERPL-MCNC: 4.1 MMOL/L — SIGNIFICANT CHANGE UP (ref 3.5–5.3)
POTASSIUM SERPL-MCNC: 4.4 MMOL/L — SIGNIFICANT CHANGE UP (ref 3.5–5.3)
POTASSIUM SERPL-SCNC: 3.8 MMOL/L — SIGNIFICANT CHANGE UP (ref 3.5–5.3)
POTASSIUM SERPL-SCNC: 3.9 MMOL/L — SIGNIFICANT CHANGE UP (ref 3.5–5.3)
POTASSIUM SERPL-SCNC: 3.9 MMOL/L — SIGNIFICANT CHANGE UP (ref 3.5–5.3)
POTASSIUM SERPL-SCNC: 4 MMOL/L — SIGNIFICANT CHANGE UP (ref 3.5–5.3)
POTASSIUM SERPL-SCNC: 4.1 MMOL/L — SIGNIFICANT CHANGE UP (ref 3.5–5.3)
POTASSIUM SERPL-SCNC: 4.4 MMOL/L — SIGNIFICANT CHANGE UP (ref 3.5–5.3)
PROT ?TM UR-MCNC: 51 MG/DL — SIGNIFICANT CHANGE UP
PROT ?TM UR-MCNC: 51 MG/DL — SIGNIFICANT CHANGE UP
PROT PATTERN 24H UR ELPH-IMP: SIGNIFICANT CHANGE UP
PROT PATTERN SERPL ELPH-IMP: SIGNIFICANT CHANGE UP
PROT SERPL-MCNC: 6.8 G/DL — SIGNIFICANT CHANGE UP
PROT SERPL-MCNC: 6.8 G/DL — SIGNIFICANT CHANGE UP (ref 6–8.3)
PROT SERPL-MCNC: 7.4 G/DL — SIGNIFICANT CHANGE UP (ref 6–8.3)
PROTHROM AB SERPL-ACNC: 16.2 SEC — HIGH (ref 10.5–13.4)
PROTHROM AB SERPL-ACNC: 16.8 SEC — HIGH (ref 10.5–13.4)
RBC # BLD: 4.1 M/UL — SIGNIFICANT CHANGE UP (ref 3.8–5.2)
RBC # BLD: 4.13 M/UL — SIGNIFICANT CHANGE UP (ref 3.8–5.2)
RBC # BLD: 4.19 M/UL — SIGNIFICANT CHANGE UP (ref 3.8–5.2)
RBC # BLD: 4.35 M/UL — SIGNIFICANT CHANGE UP (ref 3.8–5.2)
RBC # FLD: 17.2 % — HIGH (ref 10.3–14.5)
RBC # FLD: 17.4 % — HIGH (ref 10.3–14.5)
RBC # FLD: 17.4 % — HIGH (ref 10.3–14.5)
RBC # FLD: 17.9 % — HIGH (ref 10.3–14.5)
RBC BLD AUTO: ABNORMAL
SAO2 % BLDV: 99.9 % — HIGH (ref 67–88)
SMUDGE CELLS # BLD: PRESENT — SIGNIFICANT CHANGE UP
SODIUM SERPL-SCNC: 125 MMOL/L — LOW (ref 135–145)
SODIUM SERPL-SCNC: 126 MMOL/L — LOW (ref 135–145)
SODIUM SERPL-SCNC: 127 MMOL/L — LOW (ref 135–145)
SODIUM SERPL-SCNC: 128 MMOL/L — LOW (ref 135–145)
SODIUM UR-SCNC: 135 MMOL/L — SIGNIFICANT CHANGE UP
SURGICAL PATHOLOGY STUDY: SIGNIFICANT CHANGE UP
TRIGL SERPL-MCNC: 145 MG/DL — SIGNIFICANT CHANGE UP
TROPONIN T, HIGH SENSITIVITY RESULT: 13 NG/L — SIGNIFICANT CHANGE UP
TROPONIN T, HIGH SENSITIVITY RESULT: 13 NG/L — SIGNIFICANT CHANGE UP
TSH SERPL-MCNC: 1.02 UIU/ML — SIGNIFICANT CHANGE UP (ref 0.27–4.2)
URATE SERPL-MCNC: 3.8 MG/DL — SIGNIFICANT CHANGE UP (ref 2.5–7)
VARIANT LYMPHS # BLD: 6.1 % — HIGH (ref 0–6)
WBC # BLD: 10.67 K/UL — HIGH (ref 3.8–10.5)
WBC # BLD: 9.15 K/UL — SIGNIFICANT CHANGE UP (ref 3.8–10.5)
WBC # BLD: 9.46 K/UL — SIGNIFICANT CHANGE UP (ref 3.8–10.5)
WBC # BLD: 9.65 K/UL — SIGNIFICANT CHANGE UP (ref 3.8–10.5)
WBC # FLD AUTO: 10.67 K/UL — HIGH (ref 3.8–10.5)
WBC # FLD AUTO: 9.15 K/UL — SIGNIFICANT CHANGE UP (ref 3.8–10.5)
WBC # FLD AUTO: 9.46 K/UL — SIGNIFICANT CHANGE UP (ref 3.8–10.5)
WBC # FLD AUTO: 9.65 K/UL — SIGNIFICANT CHANGE UP (ref 3.8–10.5)

## 2023-01-01 PROCEDURE — 99232 SBSQ HOSP IP/OBS MODERATE 35: CPT | Mod: GC

## 2023-01-01 PROCEDURE — 93296 REM INTERROG EVL PM/IDS: CPT

## 2023-01-01 PROCEDURE — 99239 HOSP IP/OBS DSCHRG MGMT >30: CPT | Mod: GC

## 2023-01-01 PROCEDURE — 88342 IMHCHEM/IMCYTCHM 1ST ANTB: CPT | Mod: 26

## 2023-01-01 PROCEDURE — 99223 1ST HOSP IP/OBS HIGH 75: CPT

## 2023-01-01 PROCEDURE — 71275 CT ANGIOGRAPHY CHEST: CPT | Mod: 26,MA

## 2023-01-01 PROCEDURE — 93280 PM DEVICE PROGR EVAL DUAL: CPT | Mod: 26

## 2023-01-01 PROCEDURE — 86334 IMMUNOFIX E-PHORESIS SERUM: CPT | Mod: 26

## 2023-01-01 PROCEDURE — 99232 SBSQ HOSP IP/OBS MODERATE 35: CPT

## 2023-01-01 PROCEDURE — 93294 REM INTERROG EVL PM/LDLS PM: CPT

## 2023-01-01 PROCEDURE — 99214 OFFICE O/P EST MOD 30 MIN: CPT | Mod: 25

## 2023-01-01 PROCEDURE — 99233 SBSQ HOSP IP/OBS HIGH 50: CPT | Mod: GC

## 2023-01-01 PROCEDURE — 43239 EGD BIOPSY SINGLE/MULTIPLE: CPT | Mod: 59

## 2023-01-01 PROCEDURE — 73552 X-RAY EXAM OF FEMUR 2/>: CPT | Mod: 26,50

## 2023-01-01 PROCEDURE — 71045 X-RAY EXAM CHEST 1 VIEW: CPT | Mod: 26

## 2023-01-01 PROCEDURE — 86335 IMMUNFIX E-PHORSIS/URINE/CSF: CPT | Mod: 26

## 2023-01-01 PROCEDURE — 93306 TTE W/DOPPLER COMPLETE: CPT | Mod: 26

## 2023-01-01 PROCEDURE — 43238 EGD US FINE NEEDLE BX/ASPIR: CPT

## 2023-01-01 PROCEDURE — 84165 PROTEIN E-PHORESIS SERUM: CPT | Mod: 26

## 2023-01-01 PROCEDURE — 99203 OFFICE O/P NEW LOW 30 MIN: CPT

## 2023-01-01 PROCEDURE — 88305 TISSUE EXAM BY PATHOLOGIST: CPT | Mod: 26

## 2023-01-01 PROCEDURE — 99285 EMERGENCY DEPT VISIT HI MDM: CPT

## 2023-01-01 PROCEDURE — 93280 PM DEVICE PROGR EVAL DUAL: CPT

## 2023-01-01 PROCEDURE — 84166 PROTEIN E-PHORESIS/URINE/CSF: CPT | Mod: 26

## 2023-01-01 PROCEDURE — 99222 1ST HOSP IP/OBS MODERATE 55: CPT | Mod: GC

## 2023-01-01 PROCEDURE — 88341 IMHCHEM/IMCYTCHM EA ADD ANTB: CPT | Mod: 26

## 2023-01-01 PROCEDURE — 93000 ELECTROCARDIOGRAM COMPLETE: CPT

## 2023-01-01 PROCEDURE — 88173 CYTOPATH EVAL FNA REPORT: CPT | Mod: 26

## 2023-01-01 RX ORDER — VALSARTAN 80 MG/1
320 TABLET ORAL DAILY
Refills: 0 | Status: DISCONTINUED | OUTPATIENT
Start: 2023-01-01 | End: 2023-01-01

## 2023-01-01 RX ORDER — DEXTROSE 50 % IN WATER 50 %
25 SYRINGE (ML) INTRAVENOUS ONCE
Refills: 0 | Status: DISCONTINUED | OUTPATIENT
Start: 2023-01-01 | End: 2023-01-01

## 2023-01-01 RX ORDER — METOPROLOL TARTRATE 50 MG
50 TABLET ORAL DAILY
Refills: 0 | Status: DISCONTINUED | OUTPATIENT
Start: 2023-01-01 | End: 2023-01-01

## 2023-01-01 RX ORDER — METOPROLOL TARTRATE 50 MG
1 TABLET ORAL
Qty: 0 | Refills: 0 | DISCHARGE
Start: 2023-01-01

## 2023-01-01 RX ORDER — LACTULOSE 10 G/15ML
10 SOLUTION ORAL ONCE
Refills: 0 | Status: COMPLETED | OUTPATIENT
Start: 2023-01-01 | End: 2023-01-01

## 2023-01-01 RX ORDER — ASPIRIN/CALCIUM CARB/MAGNESIUM 324 MG
0 TABLET ORAL
Qty: 0 | Refills: 0 | DISCHARGE

## 2023-01-01 RX ORDER — LEVOFLOXACIN 5 MG/ML
1 INJECTION, SOLUTION INTRAVENOUS
Refills: 0 | DISCHARGE
Start: 2023-01-01 | End: 2023-01-01

## 2023-01-01 RX ORDER — SENNA PLUS 8.6 MG/1
2 TABLET ORAL ONCE
Refills: 0 | Status: DISCONTINUED | OUTPATIENT
Start: 2023-01-01 | End: 2023-01-01

## 2023-01-01 RX ORDER — POLYETHYLENE GLYCOL 3350 17 G/17G
17 POWDER, FOR SOLUTION ORAL ONCE
Refills: 0 | Status: COMPLETED | OUTPATIENT
Start: 2023-01-01 | End: 2023-01-01

## 2023-01-01 RX ORDER — POLYETHYLENE GLYCOL 3350 17 G/17G
17 POWDER, FOR SOLUTION ORAL
Refills: 0 | Status: DISCONTINUED | OUTPATIENT
Start: 2023-01-01 | End: 2023-01-01

## 2023-01-01 RX ORDER — ASPIRIN/CALCIUM CARB/MAGNESIUM 324 MG
81 TABLET ORAL DAILY
Refills: 0 | Status: DISCONTINUED | OUTPATIENT
Start: 2023-01-01 | End: 2023-01-01

## 2023-01-01 RX ORDER — FUROSEMIDE 40 MG
20 TABLET ORAL DAILY
Refills: 0 | Status: DISCONTINUED | OUTPATIENT
Start: 2023-01-01 | End: 2023-01-01

## 2023-01-01 RX ORDER — SODIUM CHLORIDE 9 MG/ML
1 INJECTION INTRAMUSCULAR; INTRAVENOUS; SUBCUTANEOUS EVERY 8 HOURS
Refills: 0 | Status: COMPLETED | OUTPATIENT
Start: 2023-01-01 | End: 2023-01-01

## 2023-01-01 RX ORDER — ATORVASTATIN CALCIUM 80 MG/1
1 TABLET, FILM COATED ORAL
Qty: 0 | Refills: 0 | DISCHARGE
Start: 2023-01-01

## 2023-01-01 RX ORDER — ENOXAPARIN SODIUM 100 MG/ML
40 INJECTION SUBCUTANEOUS EVERY 24 HOURS
Refills: 0 | Status: DISCONTINUED | OUTPATIENT
Start: 2023-01-01 | End: 2023-01-01

## 2023-01-01 RX ORDER — SODIUM CHLORIDE 5 G/100ML
500 INJECTION, SOLUTION INTRAVENOUS
Refills: 0 | Status: DISCONTINUED | OUTPATIENT
Start: 2023-01-01 | End: 2023-01-01

## 2023-01-01 RX ORDER — AMOXICILLIN 250 MG/5ML
750 SUSPENSION, RECONSTITUTED, ORAL (ML) ORAL THREE TIMES A DAY
Refills: 0 | Status: COMPLETED | OUTPATIENT
Start: 2023-01-01 | End: 2023-01-01

## 2023-01-01 RX ORDER — SODIUM CHLORIDE 9 MG/ML
1000 INJECTION, SOLUTION INTRAVENOUS
Refills: 0 | Status: DISCONTINUED | OUTPATIENT
Start: 2023-01-01 | End: 2023-01-01

## 2023-01-01 RX ORDER — SODIUM CHLORIDE 9 MG/ML
1 INJECTION INTRAMUSCULAR; INTRAVENOUS; SUBCUTANEOUS ONCE
Refills: 0 | Status: COMPLETED | OUTPATIENT
Start: 2023-01-01 | End: 2023-01-01

## 2023-01-01 RX ORDER — OMEPRAZOLE 10 MG/1
1 CAPSULE, DELAYED RELEASE ORAL
Refills: 0 | DISCHARGE
Start: 2023-01-01 | End: 2023-01-01

## 2023-01-01 RX ORDER — INSULIN LISPRO 100/ML
1 VIAL (ML) SUBCUTANEOUS
Refills: 0 | Status: DISCONTINUED | OUTPATIENT
Start: 2023-01-01 | End: 2023-01-01

## 2023-01-01 RX ORDER — ATORVASTATIN CALCIUM 80 MG/1
0 TABLET, FILM COATED ORAL
Qty: 0 | Refills: 0 | DISCHARGE

## 2023-01-01 RX ORDER — AMOXICILLIN 250 MG/5ML
2 SUSPENSION, RECONSTITUTED, ORAL (ML) ORAL
Refills: 0 | DISCHARGE
Start: 2023-01-01 | End: 2023-01-01

## 2023-01-01 RX ORDER — ATORVASTATIN CALCIUM 80 MG/1
20 TABLET, FILM COATED ORAL AT BEDTIME
Refills: 0 | Status: DISCONTINUED | OUTPATIENT
Start: 2023-01-01 | End: 2023-01-01

## 2023-01-01 RX ORDER — VALSARTAN 80 MG/1
1 TABLET ORAL
Qty: 30 | Refills: 0
Start: 2023-01-01 | End: 2023-01-01

## 2023-01-01 RX ORDER — SODIUM CHLORIDE 9 MG/ML
1 INJECTION INTRAMUSCULAR; INTRAVENOUS; SUBCUTANEOUS THREE TIMES A DAY
Refills: 0 | Status: DISCONTINUED | OUTPATIENT
Start: 2023-01-01 | End: 2023-01-01

## 2023-01-01 RX ORDER — LANOLIN ALCOHOL/MO/W.PET/CERES
3 CREAM (GRAM) TOPICAL AT BEDTIME
Refills: 0 | Status: DISCONTINUED | OUTPATIENT
Start: 2023-01-01 | End: 2023-01-01

## 2023-01-01 RX ORDER — INSULIN GLARGINE 100 [IU]/ML
3 INJECTION, SOLUTION SUBCUTANEOUS AT BEDTIME
Refills: 0 | Status: DISCONTINUED | OUTPATIENT
Start: 2023-01-01 | End: 2023-01-01

## 2023-01-01 RX ORDER — SITAGLIPTIN 50 MG/1
0 TABLET, FILM COATED ORAL
Qty: 0 | Refills: 0 | DISCHARGE

## 2023-01-01 RX ORDER — FUROSEMIDE 40 MG
20 TABLET ORAL ONCE
Refills: 0 | Status: COMPLETED | OUTPATIENT
Start: 2023-01-01 | End: 2023-01-01

## 2023-01-01 RX ORDER — INSULIN LISPRO 100/ML
VIAL (ML) SUBCUTANEOUS
Refills: 0 | Status: DISCONTINUED | OUTPATIENT
Start: 2023-01-01 | End: 2023-01-01

## 2023-01-01 RX ORDER — VALSARTAN 80 MG/1
180 TABLET ORAL DAILY
Refills: 0 | Status: DISCONTINUED | OUTPATIENT
Start: 2023-01-01 | End: 2023-01-01

## 2023-01-01 RX ORDER — DEXTROSE 50 % IN WATER 50 %
15 SYRINGE (ML) INTRAVENOUS ONCE
Refills: 0 | Status: DISCONTINUED | OUTPATIENT
Start: 2023-01-01 | End: 2023-01-01

## 2023-01-01 RX ORDER — METOPROLOL TARTRATE 50 MG
0 TABLET ORAL
Qty: 0 | Refills: 0 | DISCHARGE

## 2023-01-01 RX ORDER — FUROSEMIDE 40 MG
1 TABLET ORAL
Qty: 30 | Refills: 0
Start: 2023-01-01 | End: 2023-01-01

## 2023-01-01 RX ORDER — PANTOPRAZOLE SODIUM 20 MG/1
40 TABLET, DELAYED RELEASE ORAL
Refills: 0 | Status: COMPLETED | OUTPATIENT
Start: 2023-01-01 | End: 2023-01-01

## 2023-01-01 RX ORDER — GLUCAGON INJECTION, SOLUTION 0.5 MG/.1ML
1 INJECTION, SOLUTION SUBCUTANEOUS ONCE
Refills: 0 | Status: DISCONTINUED | OUTPATIENT
Start: 2023-01-01 | End: 2023-01-01

## 2023-01-01 RX ORDER — SENNA PLUS 8.6 MG/1
2 TABLET ORAL
Qty: 0 | Refills: 0 | DISCHARGE
Start: 2023-01-01

## 2023-01-01 RX ORDER — ONDANSETRON 8 MG/1
4 TABLET, FILM COATED ORAL EVERY 8 HOURS
Refills: 0 | Status: DISCONTINUED | OUTPATIENT
Start: 2023-01-01 | End: 2023-01-01

## 2023-01-01 RX ORDER — VALSARTAN 80 MG/1
1 TABLET ORAL
Qty: 0 | Refills: 0 | DISCHARGE

## 2023-01-01 RX ORDER — SODIUM CHLORIDE 9 MG/ML
2 INJECTION INTRAMUSCULAR; INTRAVENOUS; SUBCUTANEOUS
Qty: 180 | Refills: 0
Start: 2023-01-01 | End: 2023-01-01

## 2023-01-01 RX ORDER — DEXTROSE 50 % IN WATER 50 %
12.5 SYRINGE (ML) INTRAVENOUS ONCE
Refills: 0 | Status: DISCONTINUED | OUTPATIENT
Start: 2023-01-01 | End: 2023-01-01

## 2023-01-01 RX ORDER — ACETAMINOPHEN 500 MG
650 TABLET ORAL EVERY 6 HOURS
Refills: 0 | Status: DISCONTINUED | OUTPATIENT
Start: 2023-01-01 | End: 2023-01-01

## 2023-01-01 RX ORDER — SODIUM CHLORIDE 9 MG/ML
1000 INJECTION INTRAMUSCULAR; INTRAVENOUS; SUBCUTANEOUS ONCE
Refills: 0 | Status: COMPLETED | OUTPATIENT
Start: 2023-01-01 | End: 2023-01-01

## 2023-01-01 RX ORDER — SODIUM CHLORIDE 9 MG/ML
2 INJECTION INTRAMUSCULAR; INTRAVENOUS; SUBCUTANEOUS THREE TIMES A DAY
Refills: 0 | Status: DISCONTINUED | OUTPATIENT
Start: 2023-01-01 | End: 2023-01-01

## 2023-01-01 RX ORDER — ASPIRIN/CALCIUM CARB/MAGNESIUM 324 MG
1 TABLET ORAL
Qty: 0 | Refills: 0 | DISCHARGE
Start: 2023-01-01

## 2023-01-01 RX ADMIN — Medication 750 MILLIGRAM(S): at 06:08

## 2023-01-01 RX ADMIN — Medication 750 MILLIGRAM(S): at 05:15

## 2023-01-01 RX ADMIN — Medication 750 MILLIGRAM(S): at 21:23

## 2023-01-01 RX ADMIN — SODIUM CHLORIDE 30 MILLILITER(S): 5 INJECTION, SOLUTION INTRAVENOUS at 11:55

## 2023-01-01 RX ADMIN — Medication 1 UNIT(S): at 13:16

## 2023-01-01 RX ADMIN — SODIUM CHLORIDE 50 MILLILITER(S): 5 INJECTION, SOLUTION INTRAVENOUS at 05:30

## 2023-01-01 RX ADMIN — INSULIN GLARGINE 3 UNIT(S): 100 INJECTION, SOLUTION SUBCUTANEOUS at 22:15

## 2023-01-01 RX ADMIN — INSULIN GLARGINE 3 UNIT(S): 100 INJECTION, SOLUTION SUBCUTANEOUS at 21:22

## 2023-01-01 RX ADMIN — Medication 1: at 08:38

## 2023-01-01 RX ADMIN — Medication 1: at 13:16

## 2023-01-01 RX ADMIN — Medication 1: at 21:24

## 2023-01-01 RX ADMIN — Medication 2: at 21:52

## 2023-01-01 RX ADMIN — SODIUM CHLORIDE 2 GRAM(S): 9 INJECTION INTRAMUSCULAR; INTRAVENOUS; SUBCUTANEOUS at 21:53

## 2023-01-01 RX ADMIN — SODIUM CHLORIDE 2 GRAM(S): 9 INJECTION INTRAMUSCULAR; INTRAVENOUS; SUBCUTANEOUS at 05:12

## 2023-01-01 RX ADMIN — PANTOPRAZOLE SODIUM 40 MILLIGRAM(S): 20 TABLET, DELAYED RELEASE ORAL at 05:11

## 2023-01-01 RX ADMIN — VALSARTAN 180 MILLIGRAM(S): 80 TABLET ORAL at 05:56

## 2023-01-01 RX ADMIN — SODIUM CHLORIDE 2 GRAM(S): 9 INJECTION INTRAMUSCULAR; INTRAVENOUS; SUBCUTANEOUS at 12:44

## 2023-01-01 RX ADMIN — Medication 650 MILLIGRAM(S): at 09:44

## 2023-01-01 RX ADMIN — Medication 5 MILLIGRAM(S): at 14:43

## 2023-01-01 RX ADMIN — PANTOPRAZOLE SODIUM 40 MILLIGRAM(S): 20 TABLET, DELAYED RELEASE ORAL at 06:07

## 2023-01-01 RX ADMIN — Medication 2: at 12:38

## 2023-01-01 RX ADMIN — Medication 750 MILLIGRAM(S): at 12:39

## 2023-01-01 RX ADMIN — SODIUM CHLORIDE 2 GRAM(S): 9 INJECTION INTRAMUSCULAR; INTRAVENOUS; SUBCUTANEOUS at 21:23

## 2023-01-01 RX ADMIN — SODIUM CHLORIDE 1 GRAM(S): 9 INJECTION INTRAMUSCULAR; INTRAVENOUS; SUBCUTANEOUS at 03:18

## 2023-01-01 RX ADMIN — Medication 20 MILLIGRAM(S): at 05:10

## 2023-01-01 RX ADMIN — SODIUM CHLORIDE 1 GRAM(S): 9 INJECTION INTRAMUSCULAR; INTRAVENOUS; SUBCUTANEOUS at 07:15

## 2023-01-01 RX ADMIN — Medication 750 MILLIGRAM(S): at 22:03

## 2023-01-01 RX ADMIN — Medication 750 MILLIGRAM(S): at 22:25

## 2023-01-01 RX ADMIN — Medication 81 MILLIGRAM(S): at 11:55

## 2023-01-01 RX ADMIN — Medication 1: at 12:32

## 2023-01-01 RX ADMIN — Medication 1 UNIT(S): at 09:13

## 2023-01-01 RX ADMIN — VALSARTAN 320 MILLIGRAM(S): 80 TABLET ORAL at 05:15

## 2023-01-01 RX ADMIN — Medication 50 MILLIGRAM(S): at 05:15

## 2023-01-01 RX ADMIN — Medication 750 MILLIGRAM(S): at 22:15

## 2023-01-01 RX ADMIN — PANTOPRAZOLE SODIUM 40 MILLIGRAM(S): 20 TABLET, DELAYED RELEASE ORAL at 05:57

## 2023-01-01 RX ADMIN — SODIUM CHLORIDE 1 GRAM(S): 9 INJECTION INTRAMUSCULAR; INTRAVENOUS; SUBCUTANEOUS at 18:24

## 2023-01-01 RX ADMIN — ATORVASTATIN CALCIUM 20 MILLIGRAM(S): 80 TABLET, FILM COATED ORAL at 21:23

## 2023-01-01 RX ADMIN — Medication 50 MILLIGRAM(S): at 05:58

## 2023-01-01 RX ADMIN — Medication 50 MILLIGRAM(S): at 06:14

## 2023-01-01 RX ADMIN — Medication 1 UNIT(S): at 12:33

## 2023-01-01 RX ADMIN — PANTOPRAZOLE SODIUM 40 MILLIGRAM(S): 20 TABLET, DELAYED RELEASE ORAL at 05:15

## 2023-01-01 RX ADMIN — PANTOPRAZOLE SODIUM 40 MILLIGRAM(S): 20 TABLET, DELAYED RELEASE ORAL at 06:18

## 2023-01-01 RX ADMIN — INSULIN GLARGINE 3 UNIT(S): 100 INJECTION, SOLUTION SUBCUTANEOUS at 21:55

## 2023-01-01 RX ADMIN — POLYETHYLENE GLYCOL 3350 17 GRAM(S): 17 POWDER, FOR SOLUTION ORAL at 15:19

## 2023-01-01 RX ADMIN — Medication 750 MILLIGRAM(S): at 13:00

## 2023-01-01 RX ADMIN — INSULIN GLARGINE 3 UNIT(S): 100 INJECTION, SOLUTION SUBCUTANEOUS at 22:03

## 2023-01-01 RX ADMIN — Medication 81 MILLIGRAM(S): at 14:43

## 2023-01-01 RX ADMIN — SODIUM CHLORIDE 1 GRAM(S): 9 INJECTION INTRAMUSCULAR; INTRAVENOUS; SUBCUTANEOUS at 06:18

## 2023-01-01 RX ADMIN — ATORVASTATIN CALCIUM 20 MILLIGRAM(S): 80 TABLET, FILM COATED ORAL at 22:03

## 2023-01-01 RX ADMIN — Medication 1 UNIT(S): at 17:43

## 2023-01-01 RX ADMIN — Medication 1: at 17:59

## 2023-01-01 RX ADMIN — Medication 1 UNIT(S): at 18:00

## 2023-01-01 RX ADMIN — Medication 20 MILLIGRAM(S): at 12:58

## 2023-01-01 RX ADMIN — Medication 650 MILLIGRAM(S): at 08:44

## 2023-01-01 RX ADMIN — Medication 1: at 22:03

## 2023-01-01 RX ADMIN — Medication 1 UNIT(S): at 12:59

## 2023-01-01 RX ADMIN — SODIUM CHLORIDE 2 GRAM(S): 9 INJECTION INTRAMUSCULAR; INTRAVENOUS; SUBCUTANEOUS at 12:50

## 2023-01-01 RX ADMIN — Medication 750 MILLIGRAM(S): at 22:02

## 2023-01-01 RX ADMIN — Medication 750 MILLIGRAM(S): at 06:18

## 2023-01-01 RX ADMIN — SODIUM CHLORIDE 2 GRAM(S): 9 INJECTION INTRAMUSCULAR; INTRAVENOUS; SUBCUTANEOUS at 13:19

## 2023-01-01 RX ADMIN — Medication 81 MILLIGRAM(S): at 12:21

## 2023-01-01 RX ADMIN — SODIUM CHLORIDE 1 GRAM(S): 9 INJECTION INTRAMUSCULAR; INTRAVENOUS; SUBCUTANEOUS at 18:06

## 2023-01-01 RX ADMIN — ATORVASTATIN CALCIUM 20 MILLIGRAM(S): 80 TABLET, FILM COATED ORAL at 21:53

## 2023-01-01 RX ADMIN — Medication 1 UNIT(S): at 17:59

## 2023-01-01 RX ADMIN — ATORVASTATIN CALCIUM 20 MILLIGRAM(S): 80 TABLET, FILM COATED ORAL at 22:26

## 2023-01-01 RX ADMIN — Medication 2: at 22:32

## 2023-01-01 RX ADMIN — ONDANSETRON 4 MILLIGRAM(S): 8 TABLET, FILM COATED ORAL at 18:11

## 2023-01-01 RX ADMIN — Medication 1 UNIT(S): at 18:07

## 2023-01-01 RX ADMIN — VALSARTAN 320 MILLIGRAM(S): 80 TABLET ORAL at 06:14

## 2023-01-01 RX ADMIN — Medication 3: at 18:24

## 2023-01-01 RX ADMIN — Medication 1: at 08:54

## 2023-01-01 RX ADMIN — SODIUM CHLORIDE 2 GRAM(S): 9 INJECTION INTRAMUSCULAR; INTRAVENOUS; SUBCUTANEOUS at 05:57

## 2023-01-01 RX ADMIN — Medication 2: at 12:22

## 2023-01-01 RX ADMIN — Medication 1 UNIT(S): at 12:38

## 2023-01-01 RX ADMIN — Medication 50 MILLIGRAM(S): at 05:10

## 2023-01-01 RX ADMIN — Medication 1: at 09:12

## 2023-01-01 RX ADMIN — PANTOPRAZOLE SODIUM 40 MILLIGRAM(S): 20 TABLET, DELAYED RELEASE ORAL at 18:06

## 2023-01-01 RX ADMIN — ATORVASTATIN CALCIUM 20 MILLIGRAM(S): 80 TABLET, FILM COATED ORAL at 22:15

## 2023-01-01 RX ADMIN — Medication 1: at 08:48

## 2023-01-01 RX ADMIN — Medication 81 MILLIGRAM(S): at 12:38

## 2023-01-01 RX ADMIN — SODIUM CHLORIDE 1 GRAM(S): 9 INJECTION INTRAMUSCULAR; INTRAVENOUS; SUBCUTANEOUS at 14:43

## 2023-01-01 RX ADMIN — SODIUM CHLORIDE 2 GRAM(S): 9 INJECTION INTRAMUSCULAR; INTRAVENOUS; SUBCUTANEOUS at 22:02

## 2023-01-01 RX ADMIN — Medication 81 MILLIGRAM(S): at 12:50

## 2023-01-01 RX ADMIN — ATORVASTATIN CALCIUM 20 MILLIGRAM(S): 80 TABLET, FILM COATED ORAL at 22:04

## 2023-01-01 RX ADMIN — POLYETHYLENE GLYCOL 3350 17 GRAM(S): 17 POWDER, FOR SOLUTION ORAL at 14:46

## 2023-01-01 RX ADMIN — SODIUM CHLORIDE 1 GRAM(S): 9 INJECTION INTRAMUSCULAR; INTRAVENOUS; SUBCUTANEOUS at 22:16

## 2023-01-01 RX ADMIN — Medication 81 MILLIGRAM(S): at 12:40

## 2023-01-01 RX ADMIN — PANTOPRAZOLE SODIUM 40 MILLIGRAM(S): 20 TABLET, DELAYED RELEASE ORAL at 17:43

## 2023-01-01 RX ADMIN — Medication 10 MILLIGRAM(S): at 21:23

## 2023-01-01 RX ADMIN — Medication 750 MILLIGRAM(S): at 12:37

## 2023-01-01 RX ADMIN — Medication 30 MILLILITER(S): at 09:56

## 2023-01-01 RX ADMIN — Medication 1 UNIT(S): at 18:03

## 2023-01-01 RX ADMIN — Medication 1: at 12:49

## 2023-01-01 RX ADMIN — Medication 1 UNIT(S): at 12:49

## 2023-01-01 RX ADMIN — PANTOPRAZOLE SODIUM 40 MILLIGRAM(S): 20 TABLET, DELAYED RELEASE ORAL at 18:08

## 2023-01-01 RX ADMIN — VALSARTAN 180 MILLIGRAM(S): 80 TABLET ORAL at 05:09

## 2023-01-01 RX ADMIN — VALSARTAN 320 MILLIGRAM(S): 80 TABLET ORAL at 06:09

## 2023-01-01 RX ADMIN — SODIUM CHLORIDE 50 MILLILITER(S): 5 INJECTION, SOLUTION INTRAVENOUS at 15:18

## 2023-01-01 RX ADMIN — Medication 750 MILLIGRAM(S): at 05:56

## 2023-01-01 RX ADMIN — Medication 81 MILLIGRAM(S): at 13:01

## 2023-01-01 RX ADMIN — INSULIN GLARGINE 3 UNIT(S): 100 INJECTION, SOLUTION SUBCUTANEOUS at 22:26

## 2023-01-01 RX ADMIN — SODIUM CHLORIDE 2 GRAM(S): 9 INJECTION INTRAMUSCULAR; INTRAVENOUS; SUBCUTANEOUS at 05:36

## 2023-01-01 RX ADMIN — PANTOPRAZOLE SODIUM 40 MILLIGRAM(S): 20 TABLET, DELAYED RELEASE ORAL at 17:59

## 2023-01-01 RX ADMIN — Medication 1 UNIT(S): at 08:38

## 2023-01-01 RX ADMIN — Medication 2: at 17:42

## 2023-01-01 RX ADMIN — Medication 50 MILLIGRAM(S): at 06:09

## 2023-01-01 RX ADMIN — SODIUM CHLORIDE 1 GRAM(S): 9 INJECTION INTRAMUSCULAR; INTRAVENOUS; SUBCUTANEOUS at 23:20

## 2023-01-01 RX ADMIN — Medication 2: at 22:15

## 2023-01-01 RX ADMIN — LACTULOSE 10 GRAM(S): 10 SOLUTION ORAL at 22:26

## 2023-01-01 RX ADMIN — Medication 1 UNIT(S): at 08:48

## 2023-01-01 RX ADMIN — Medication 2: at 12:59

## 2023-01-01 RX ADMIN — Medication 750 MILLIGRAM(S): at 14:43

## 2023-01-01 RX ADMIN — Medication 1 UNIT(S): at 08:23

## 2023-01-01 RX ADMIN — Medication 750 MILLIGRAM(S): at 14:46

## 2023-01-01 RX ADMIN — PANTOPRAZOLE SODIUM 40 MILLIGRAM(S): 20 TABLET, DELAYED RELEASE ORAL at 18:00

## 2023-01-01 RX ADMIN — Medication 1 UNIT(S): at 08:55

## 2023-01-01 RX ADMIN — Medication 750 MILLIGRAM(S): at 05:07

## 2023-01-01 RX ADMIN — SODIUM CHLORIDE 2 GRAM(S): 9 INJECTION INTRAMUSCULAR; INTRAVENOUS; SUBCUTANEOUS at 14:46

## 2023-01-01 RX ADMIN — Medication 20 MILLIGRAM(S): at 05:36

## 2023-01-01 RX ADMIN — Medication 1: at 08:23

## 2023-01-01 RX ADMIN — SODIUM CHLORIDE 1000 MILLILITER(S): 9 INJECTION INTRAMUSCULAR; INTRAVENOUS; SUBCUTANEOUS at 18:17

## 2023-01-01 RX ADMIN — ENOXAPARIN SODIUM 40 MILLIGRAM(S): 100 INJECTION SUBCUTANEOUS at 12:21

## 2023-01-01 RX ADMIN — Medication 20 MILLIGRAM(S): at 05:57

## 2023-01-31 PROBLEM — R10.9 ABDOMINAL PAIN: Status: ACTIVE | Noted: 2023-01-01

## 2023-01-31 NOTE — PHYSICAL EXAM
[Alert] : alert [Normal Voice/Communication] : normal voice/communication [Healthy Appearing] : healthy appearing [No Acute Distress] : no acute distress [Sclera] : the sclera and conjunctiva were normal [Hearing Threshold Finger Rub Not [place]] : hearing was normal [Normal Lips/Gums] : the lips and gums were normal [Normal Appearance] : the appearance of the neck was normal [Oropharynx] : the oropharynx was normal [No Neck Mass] : no neck mass was observed [No Respiratory Distress] : no respiratory distress [No Acc Muscle Use] : no accessory muscle use [Respiration, Rhythm And Depth] : normal respiratory rhythm and effort [Auscultation Breath Sounds / Voice Sounds] : lungs were clear to auscultation bilaterally [Heart Rate And Rhythm] : heart rate was normal and rhythm regular [Normal S1, S2] : normal S1 and S2 [Murmurs] : no murmurs [Bowel Sounds] : normal bowel sounds [Abdomen Tenderness] : non-tender [No Masses] : no abdominal mass palpated [Abdomen Soft] : soft [] : no hepatosplenomegaly [No CVA Tenderness] : no CVA  tenderness [Involuntary Movements] : no involuntary movements were seen [Normal Color / Pigmentation] : normal skin color and pigmentation [No Focal Deficits] : no focal deficits [Oriented To Time, Place, And Person] : oriented to person, place, and time

## 2023-02-05 NOTE — ASSESSMENT
[FreeTextEntry1] : Get CT of abdomen and pelvis for abdominal pain.\par Follow up in 3 weeks.\par If endoscopic procedures needed, will need cardiac clerarance and recent pacemaker check.

## 2023-02-05 NOTE — HISTORY OF PRESENT ILLNESS
[FreeTextEntry1] : Self-referred for ?abdominal pain.\par Denies nausea, vomiting.\par Reports 10 lbs from 1 year ago.\par Denies blood with BM.\par \par She reports she had a colonoscopy in [place], [place] 4 years ago which was unremarkable.\par She was apparently seeing Dr. Solitario Epps ([facility] in [place]) and had undergone EGD in November 2022 showing H.Pylori which patient reports was treated. She states that she could not get a timely follow-up appointment with their office for the abdominal pain and her daughter booked an appointment here instead. Patient does not have any referral, reports or past medical records today.\par \par Apparently recently changed her PMD recently (Dr. Bales ([facility]).\par \par Per records in [facility] she has a pacemaker, placed for atrial tachycardia. She does not know who her current cardiologist is.\par Takes a daily aspirin.\par

## 2023-03-28 PROBLEM — Z95.0 CARDIAC PACEMAKER: Status: ACTIVE | Noted: 2019-04-02

## 2023-03-28 PROBLEM — I10 BENIGN ESSENTIAL HYPERTENSION: Status: ACTIVE | Noted: 2020-03-31

## 2023-03-28 PROBLEM — I44.30 AV BLOCK: Status: ACTIVE | Noted: 2019-04-02

## 2023-03-28 PROBLEM — I47.1 ATRIAL TACHYCARDIA, PAROXYSMAL: Status: ACTIVE | Noted: 2019-04-02

## 2023-03-28 PROBLEM — R00.2 PALPITATIONS: Status: ACTIVE | Noted: 2019-04-02

## 2023-03-28 PROBLEM — I47.29 NSVT (NONSUSTAINED VENTRICULAR TACHYCARDIA): Status: ACTIVE | Noted: 2019-04-02

## 2023-03-28 PROBLEM — G45.9 TIA (TRANSIENT ISCHEMIC ATTACK): Status: ACTIVE | Noted: 2022-03-02

## 2023-03-28 PROBLEM — Z86.73 HISTORY OF CVA (CEREBROVASCULAR ACCIDENT): Status: ACTIVE | Noted: 2022-03-10

## 2023-04-10 PROBLEM — I44.2 AV BLOCK, COMPLETE: Status: ACTIVE | Noted: 2019-04-02

## 2023-06-07 NOTE — H&P ADULT - TIME BILLING
Reviewed admission imaging reports, and admission labs prior to the encounter with  the patient   Reviewed Triage and ED course/ documentation   Reviewed consultants notes, including:::  Performed full physical exam and ROS    Reviewed outpatient records on [employer]ripts as above: she was evaluated by GI, Dr. Gurwinder Rosado, on Feb 5, 2023, who referred the pt for CT Abd w/ contrast to evaluate her Abd pain; Also she was evaluated by Cardiology, Dr. Bean, on Mar 28, 2023, for atrial tachycardia, paroxysmal, AV block, AV block, complete , she was referred for Transthoracic Echocardiogram;     Obtained partial list of home medications and performed home medications reconciliation on EMR  Discussed further hospitalization plan with the pt  Ordered or reviewed new admission medications and placed or reviewed all hospitalization admission orders  Managed (continued, held or adjusted doses) of home medications   Ordered  or reviewed orders of  new imaging and new lab w/up  Requested new consultations including::: Cardiology and Electrophysiology

## 2023-06-07 NOTE — H&P ADULT - PROBLEM SELECTOR PLAN 1
Unclear etiology; Not wheezing on exam; Possible HFpEF, mild;   CXR: clear lungs, no PTX or effusions  CTA chest: no PE;   Recent TTE, 29/3/23: Mild diastolic dysfunction (Stage I) and EF=62%  Elevated Pro-BNP, 486 (maybe normal for age), Trop 13 x2  EKG: LBBB, not acute   Formal Cardiology c/s in AM, Dr. Bean   c/w BB, ASA, Statin   Formal EP c/s for PPM interrogation in AM  VBG  in AM  a1c, lipid panel, TSH

## 2023-06-07 NOTE — H&P ADULT - GAIT/BALANCE
The patient returned to clinic because she wished to pursue genetic testing. We reviewed testing options and consent was signed. The patient opted to proceed with TumorNext-HRD plus CancerNext. This test will include tumor and germline genetic testing. Results are expected in 4 weeks. I will call the patient to review her results over the phone when available and then send her a copy of the test results in the mail.     Evie Webster MS, [employer]  Senior Genetic Counselor  [phone]     Gait: not tested

## 2023-06-07 NOTE — ED PROVIDER NOTE - NS ED ATTENDING STATEMENT MOD
This was a shared visit with the SHABNAM. I reviewed and verified the documentation and independently performed the documented:

## 2023-06-07 NOTE — ED ADULT NURSE NOTE - NSFALLUNIVINTERV_ED_ALL_ED
Bed/Stretcher in lowest position, wheels locked, appropriate side rails in place/Call bell, personal items and telephone in reach/Instruct patient to call for assistance before getting out of bed/chair/stretcher/Non-slip footwear applied when patient is off stretcher/[place] to call system/Physically safe environment - no spills, clutter or unnecessary equipment/Purposeful proactive rounding/Room/bathroom lighting operational, light cord in reach

## 2023-06-07 NOTE — H&P ADULT - NSHPPHYSICALEXAM_GEN_ALL_CORE
Vital Signs Last 24 Hrs  T(C): 37.7 (07 Jun 2023 19:36), Max: 37.7 (07 Jun 2023 19:36)  T(F): 99.8 (07 Jun 2023 19:36), Max: 99.8 (07 Jun 2023 19:36)  HR: 85 (07 Jun 2023 19:36) (83 - 86)  BP: 140/66 (07 Jun 2023 19:36) (112/50 - 140/66)  BP(mean): --  RR: 18 (07 Jun 2023 19:36) (17 - 18)  SpO2: 100% (07 Jun 2023 19:36) (100% - 100%)    Parameters below as of 07 Jun 2023 19:36  Patient On (Oxygen Delivery Method): room air

## 2023-06-07 NOTE — ED ADULT NURSE REASSESSMENT NOTE - NS ED NURSE REASSESS COMMENT FT1
Pt received lying comfortable in bed breathing room air in no acute distress. A&OX4 and denies pain. Pt admitted to medicine; awaiting bed assignment. Monitoring continues. ASUNCION Dorado RN

## 2023-06-07 NOTE — ED PROVIDER NOTE - OBJECTIVE STATEMENT
73yoF PMH HTN, HLD, DM on oral meds, H.Pylori on triple abx therapy, p/w SOB, progressively worse w/ exertion over past 2-3 weeks. Pt recently underwent CT scan revealing mass in stomach c/w possible neoplasm. pt has been having black stools/intermittent constipation, and now significant fatigue and lightheadedness. denies any calf pain, leg swelling, hx of DVT/PE, recent travel, smoking, or surgeries. Pt denies chest pain, numbness, tingling, vision changes, headaches, fevers, cough, sore throat, n/v/d. pt with no hx of anemia/prior blood transfusions in past.

## 2023-06-07 NOTE — ED PROVIDER NOTE - PHYSICAL EXAMINATION
PA RAMOS:    CONSTITUTIONAL: Well-appearing; well-nourished; in no apparent distress;  HEAD: Normocephalic, atraumatic;  EYES: conjunctiva pale, and sclera WNL;  NECK/LYMPH: Supple; non-tender;  CARD: Normal S1, S2; no murmurs, rubs, or gallops noted  RESP: Normal chest excursion with respiration; breath sounds clear and equal bilaterally; no wheezes, rhonchi, or rales noted  ABD/GI: soft, non-distended; non-tender; no palpable organomegaly, no pulsatile mass  EXT/MS: moves all extremities  SKIN: Normal for age and race; warm; dry; good turgor; no apparent lesions or exudate noted  NEURO: Awake, alert, oriented x 3, no gross deficits, CN II-XII grossly intact, no motor or sensory deficit noted  PSYCH: Normal mood; appropriate affect

## 2023-06-07 NOTE — H&P ADULT - NSHPLABSRESULTS_GEN_ALL_CORE
.      -Personally interpreted EKG: pending       -Personally interpreted CXR: clear lungs, no PTX, no pleural effusions      -Personally reviewed labs below:                        11.6   10.67 )-----------( 204      ( 07 Jun 2023 15:02 )             36.5   06-07    125<L>  |  91<L>  |  11  ----------------------------<  170<H>  4.4   |  25  |  0.84    Ca    9.0      07 Jun 2023 15:02    TPro  7.4  /  Alb  3.0<L>  /  TBili  0.6  /  DBili  x   /  AST  49<H>  /  ALT  27  /  AlkPhos  80  06-07    -Personally reviewed: CT ANGIO CHEST PULM [facility]   ORDERED BY: HANS RAMOS     PROCEDURE DATE:  06/07/2023    INTERPRETATION:  CLINICAL INFORMATION: Shortness of breath. Evaluate for pulmonary embolism.  FINDINGS:  PULMONARY ANGIOGRAM: Contrast bolus is satisfactory. No main, right main,   left main, lobar, segmental, or subsegmental pulmonary embolism.  LYMPH NODES: No lymphadenopathy.  HEART/VASCULATURE: Cardiomegaly. No pericardial effusion. The aorta is   normal in caliber.  There are aortic calcifications.  AIRWAYS/LUNGS/PLEURA: Right basilar linear atelectasis. No pleural   effusion.  UPPER ABDOMEN: Indeterminant left upper retroperitoneal mass measures 3.7   x 3.2 cm in between the gastric fundus and and spleen.  BONES/SOFT TISSUES: Unremarkable. Left chest wall pacemaker with leads in   the right atrium and ventricle.  IMPRESSION:  No pulmonary embolism.  3.7 x 3.2 cm indeterminant left upper retroperitoneal mass. Further   evaluation can be performed with MRI.      -Personally reviewed: TTE dated 3/29/2023    EF 62%  CONCLUSIONS:  1. Mitral annular calcification, otherwise normal mitral  valve. Mild mitral regurgitation.  2. Normal left ventricular internal dimensions and wall  thicknesses.  3. Normal left ventricular systolic function. No segmental  wall motion abnormalities.  4. Mild diastolic dysfunction (Stage I).  5. The right ventricle is not well visualized; grossly  normal right ventricular systolic function.  A device wire  is noted in the right heart.  6. Normal tricuspid valve. Mild tricuspid regurgitation.  7. Estimated pulmonary artery systolic pressure equals 36  mm Hg, assuming right atrial pressure equals 10  mm Hg,  consistent with borderline pulmonary hypertension. .      -Personally interpreted EKG: nsr 80bpm, qtc 463, no acute Tw or St changes, LBBB, no pacs or pvcs      -Personally interpreted CXR: clear lungs, no PTX, no pleural effusions      -Personally reviewed labs below:                        11.6   10.67 )-----------( 204      ( 07 Jun 2023 15:02 )             36.5   06-07    125<L>  |  91<L>  |  11  ----------------------------<  170<H>  4.4   |  25  |  0.84    Ca    9.0      07 Jun 2023 15:02    TPro  7.4  /  Alb  3.0<L>  /  TBili  0.6  /  DBili  x   /  AST  49<H>  /  ALT  27  /  AlkPhos  80  06-07    -[facility]   ORDERED BY: HANS RAMOS     PROCEDURE DATE:  06/07/2023    INTERPRETATION:  CLINICAL INFORMATION: Shortness of breath. Evaluate for pulmonary embolism.  FINDINGS:  PULMONARY ANGIOGRAM: Contrast bolus is satisfactory. No main, right main,   left main, lobar, segmental, or subsegmental pulmonary embolism.  LYMPH NODES: No lymphadenopathy.  HEART/VASCULATURE: Cardiomegaly. No pericardial effusion. The aorta is   normal in caliber.  There are aortic calcifications.  AIRWAYS/LUNGS/PLEURA: Right basilar linear atelectasis. No pleural   effusion.  UPPER ABDOMEN: Indeterminant left upper retroperitoneal mass measures 3.7   x 3.2 cm in between the gastric fundus and and spleen.  BONES/SOFT TISSUES: Unremarkable. Left chest wall pacemaker with leads in   the right atrium and ventricle.  IMPRESSION:  No pulmonary embolism.  3.7 x 3.2 cm indeterminant left upper retroperitoneal mass. Further   evaluation can be performed with MRI.      -Personally reviewed: TTE dated 3/29/2023    EF 62%  CONCLUSIONS:  1. Mitral annular calcification, otherwise normal mitral  valve. Mild mitral regurgitation.  2. Normal left ventricular internal dimensions and wall  thicknesses.  3. Normal left ventricular systolic function. No segmental  wall motion abnormalities.  4. Mild diastolic dysfunction (Stage I).  5. The right ventricle is not well visualized; grossly  normal right ventricular systolic function.  A device wire  is noted in the right heart.  6. Normal tricuspid valve. Mild tricuspid regurgitation.  7. Estimated pulmonary artery systolic pressure equals 36  mm Hg, assuming right atrial pressure equals 10  mm Hg,  consistent with borderline pulmonary hypertension.      -Personally reviewed: NST dated 4/24/2019  NUCLEAR FINDINGS:  There is a small, mild to moderate defect in septal wall  that is fixed, suggestive of infarct.  ------------------------------------------------------------------------  GATED ANALYSIS:  Post-stress gated wall motion analysis was performed (LVEF  = 67 %;LVEDV = 83 ml.)  ------------------------------------------------------------------------  IMPRESSIONS:Abnormal Study  * There is a small, mild to moderate defect in septal wall  that is fixed, suggestive of infarct.  * Post-stress gated wall motion analysis was performed  (LVEF = 67 %;LVEDV = 83 ml.)  * Consistent with no clear evidence of ischemia. .      -Personally interpreted EKG: nsr 80bpm, qtc 463, no acute Tw or St changes, LBBB, no pacs or pvcs      -Personally interpreted CXR: clear lungs, no PTX, no pleural effusions, LT chest PPM      -Personally reviewed labs below:                        11.6   10.67 )-----------( 204      ( 07 Jun 2023 15:02 )             36.5   06-07    125<L>  |  91<L>  |  11  ----------------------------<  170<H>  4.4   |  25  |  0.84    Ca    9.0      07 Jun 2023 15:02    TPro  7.4  /  Alb  3.0<L>  /  TBili  0.6  /  DBili  x   /  AST  49<H>  /  ALT  27  /  AlkPhos  80  06-07    -Personally reviewed: CT ANGIO CHEST PULM [facility]   ORDERED BY: HANS RAMOS     PROCEDURE DATE:  06/07/2023    INTERPRETATION:  CLINICAL INFORMATION: Shortness of breath. Evaluate for pulmonary embolism.  FINDINGS:  PULMONARY ANGIOGRAM: Contrast bolus is satisfactory. No main, right main,   left main, lobar, segmental, or subsegmental pulmonary embolism.  LYMPH NODES: No lymphadenopathy.  HEART/VASCULATURE: Cardiomegaly. No pericardial effusion. The aorta is   normal in caliber.  There are aortic calcifications.  AIRWAYS/LUNGS/PLEURA: Right basilar linear atelectasis. No pleural   effusion.  UPPER ABDOMEN: Indeterminant left upper retroperitoneal mass measures 3.7   x 3.2 cm in between the gastric fundus and and spleen.  BONES/SOFT TISSUES: Unremarkable. Left chest wall pacemaker with leads in   the right atrium and ventricle.  IMPRESSION:  No pulmonary embolism.  3.7 x 3.2 cm indeterminant left upper retroperitoneal mass. Further   evaluation can be performed with MRI.      -Personally reviewed: TTE dated 3/29/2023    EF 62%  CONCLUSIONS:  1. Mitral annular calcification, otherwise normal mitral  valve. Mild mitral regurgitation.  2. Normal left ventricular internal dimensions and wall  thicknesses.  3. Normal left ventricular systolic function. No segmental  wall motion abnormalities.  4. Mild diastolic dysfunction (Stage I).  5. The right ventricle is not well visualized; grossly  normal right ventricular systolic function.  A device wire  is noted in the right heart.  6. Normal tricuspid valve. Mild tricuspid regurgitation.  7. Estimated pulmonary artery systolic pressure equals 36  mm Hg, assuming right atrial pressure equals 10  mm Hg,  consistent with borderline pulmonary hypertension.      -Personally reviewed: NST dated 4/24/2019  NUCLEAR FINDINGS:  There is a small, mild to moderate defect in septal wall  that is fixed, suggestive of infarct.  ------------------------------------------------------------------------  GATED ANALYSIS:  Post-stress gated wall motion analysis was performed (LVEF  = 67 %;LVEDV = 83 ml.)  ------------------------------------------------------------------------  IMPRESSIONS:Abnormal Study  * There is a small, mild to moderate defect in septal wall  that is fixed, suggestive of infarct.  * Post-stress gated wall motion analysis was performed  (LVEF = 67 %;LVEDV = 83 ml.)  * Consistent with no clear evidence of ischemia.

## 2023-06-07 NOTE — H&P ADULT - PROBLEM SELECTOR PLAN 3
CTA chest: 3.7 x 3.2 cm indeterminant left upper retroperitoneal mass. Further evaluation can be performed with MRI.  Possible metastatic disease given reportedly outpt CT A/P significant for mass.  -Holding MRI pending CT Abd/Plv report  review   -Non-urgent Oncology c/s

## 2023-06-07 NOTE — H&P ADULT - PROBLEM SELECTOR PLAN 5
Hold Heparin DVT due to possible GI iso GI  mass  SCDs QK=216  Hold oral agents;  -ISS  -DM diet  -A1c

## 2023-06-07 NOTE — ED PROVIDER NOTE - CLINICAL SUMMARY MEDICAL DECISION MAKING FREE TEXT BOX
Dev: HTN, HL, H. pylori (s/p triple therapy). Recent imaging w/ gastric mass. Sent for MRI. Has black stools and SOB. Will do CTPA for PE. Check Hb. Admit. No role for PPI in undifferentiated UGIB, as there are no statistically significant benefit in the primary outcomes of mortality, risk of re-bleeding, or need for surgical intervention. (Taina CARVER, John JULES, Dagoberto CAMPOS, Panda S, Sly MONTAGUE, Lalo MERRILL, Tasha P. Proton pump inhibitor treatment initiated prior to endoscopic diagnosis in upper gastrointestinal bleeding. Deep Database Syst Rev. 2010;7:VA195414.) and may be harmful in people of  descent (though may be helpful in people of  descent) (Dagoberto CAMPOS, Dilcia PHILIPPE, Mumtaz ROLDAN, Sly MONTAGUE. Proton pump inhibitor treatment for acute peptic ulcer bleeding. Deep Database Syst Rev. 2004;(3):RZ323034.)

## 2023-06-07 NOTE — ED PROVIDER NOTE - ATTENDING APP SHARED VISIT CONTRIBUTION OF CARE
I performed a face-to-face evaluation of the patient and performed a history and physical examination. I agree with the history and physical examination. If this was a PA visit, I personally saw the patient with the PA and performed a substantive portion of the visit including all aspects of the medical decision making.    HTN, HL, H. pylori (s/p triple therapy). Recent imaging w/ gastric mass. Sent for MRI. Has black stools and SOB. Will do CTPA for PE. Check Hb. Admit. No role for PPI in undifferentiated UGIB, as there are no statistically significant benefit in the primary outcomes of mortality, risk of re-bleeding, or need for surgical intervention. (Taina CARVER, John J, Dagoberto CAPMOS, Panda S, Sly MONTAGUE, Lalo MERRILL, Tasha P. Proton pump inhibitor treatment initiated prior to endoscopic diagnosis in upper gastrointestinal bleeding. Deep Database Syst Rev. 2010;7:OH094292.) and may be harmful in people of  descent (though may be helpful in people of  descent) (Dagoberto CAMPOS, Dilcia PHILIPPE, Mumtaz RODLAN, Sly MONTAGUE. Proton pump inhibitor treatment for acute peptic ulcer bleeding. [place] Database Syst Rev. 2004;(3):EO060376.)

## 2023-06-07 NOTE — ED ADULT TRIAGE NOTE - CHIEF COMPLAINT QUOTE
As per son-in-law, Pt has worsening CP, SOB, dizziness Abdominal pain X6 months.  Pt is currently being worked up for the abdominal pain with possibility of cancer.  Denies nausea, vomiting, diarrhea.  PMHX- PPM, HTN, HLD.

## 2023-06-07 NOTE — ED ADULT NURSE NOTE - OBJECTIVE STATEMENT
73 year old female received to 29 AAOx3 ambulatory. Per patient's daughter, pt having persistent shortness of breath. Pt had outpatient CT showing possible cancer and recommended to have MRI. Pt denies headache, dizziness, chest pain, n/v/d, fever at this time. Respirations even and unlabored, no respiratory distress noted, sating 100% on room air. PIV #20 left AC established. VS as noted. Medicated per MD orders. Bed in lowest position, call bell within reach. Family at bedside

## 2023-06-07 NOTE — H&P ADULT - NEUROLOGICAL
CHIEF COMPLAINT: Physical      HISTORY OF PRESENT ILLNESS: Patient is a 52year old female here to establish care, for a physical, and to discuss her chronic medical issues. She was previously following with GYN however admits that she is overdue for routine follow-up. She has a history of hypertension, diabetes, and dyslipidemia. She admits that she has been out of her antihypertensive for more than 1 month. She reports feeling well. She denies chest pain or shortness of breath. She reports several skin tags on her face, neck, and chest. She notes she has had these for years however states they are becoming bothersome and would like to see dermatology. MEDICATIONS:  Current Outpatient Medications   Medication   â¢ ibuprofen (MOTRIN) 800 MG tablet   â¢ amoxicillin (AMOXIL) 500 MG tablet   â¢ losartan (COZAAR) 25 MG tablet   â¢ clotrimazole (LOTRIMIN) 1 % cream   â¢ cholecalciferol (VITAMIN D3) 1000 UNITS tablet   â¢ aspirin 81 MG tablet     No current facility-administered medications for this visit.         ALLERGIES:  No Known Allergies    Past Medical History:   Diagnosis Date   â¢ Abnormal EKG    â¢ Fungal infection of foot    â¢ Microalbuminuria 11/10/2016   â¢ Midline low back pain without sciatica 5/3/2016   â¢ Nicotine dependence    â¢ Obesity, unspecified    â¢ Other and unspecified hyperlipidemia    â¢ Trichomoniasis 11/18/2011   â¢ Type 2 diabetes mellitus without complication, without long-term current use of insulin (CMS/[facility]) 9/14/2017   â¢ Unspecified essential hypertension    â¢ Vitamin D deficiency        Past Surgical History:   Procedure Laterality Date   â¢ Cyst removal      left shoulder       Family History   Problem Relation Age of Onset   â¢ Asthma Father    â¢ Other Father         emphysema   â¢ Cancer Father         bladder   â¢ Hypertension Mother    â¢ Asthma Brother         as a child   â¢ * Brother         unknown health issus   â¢ * Sister         unknown health issus   â¢ Allergic Rhinitis Sister    â¢ "Cancer Brother         colon age54's   â¢ * Brother         was in war   â¢ Heart Sister         cardiomegaly   â¢ Hypertension Sister    â¢ * Maternal Grandmother          DVT   â¢ * Maternal Grandfather         unknown health issues   â¢ * Paternal Grandmother         unknown health issues   â¢ * Paternal Grandfather         unknown health issus       SOCIAL HISTORY:   She is a former smoker. She reports marijuana use 1-2 times per week. She reports alcohol intake on a social basis. REVIEW OF SYSTEMS:  GENERAL: Denies fever, chills, or weight changes. SKIN: As per history of present illness. No hair or nail concerns. No bruising. EENT: Denies blurry vision or double vision. Denies hearing concerns, sinus congestion, rhinorrhea, or difficulty swallowing. CARDIOVASCULAR: Denies chest pains, palpitations, leg pain or peripheral edema. RESPIRATORY: Denies shortness of breath, wheezing, coughing or snoring. BREASTS: Denies lumps, pain, skin changes or nipple discharge. GASTROINTESTINAL: Denies abdominal pain, nausea, vomiting, diarrhea, constipation, or bleeding. GENITOURINARY: Denies urinary pain, frequency, urgency, bleeding or incontinence. Denies vaginal pain, itching or discharge concerns. MUSCULOSKELETAL: Denies any joint or muscle pain. No unusual swelling or weakness. NEUROLOGIC: Denies headache, dizziness, numbness, seizure or memory problems. PSYCHOLOGICAL: Denies depression, anxiety, compulsions or erratic behavior. LAST MENSTRUAL PERIOD:  2019. Reg, 7d, mod flow    GYNECOLOGY HISTORY:     Birth Control: Not sexually active  Screening for breast cancer: overdue  Screening for cervical cancer: per gyn      PHYSICAL EXAM:  VITAL SIGNS:    Visit Vitals  /80   Pulse 60   Ht 5' 8.75"" (1.746 m)   Wt 131.5 kg   LMP 2019   BMI 43.14 kg/mÂ²     GENERAL: Healthy, alert and oriented to person, place, and time. No acute distress. Normal affect.    PSYCHOLOGIC: Alert and oriented " to person, place, and time. Pleasant and appropriate. Maintains eye contact. SKIN: Warm, dry, and intact without any unusual rashes or moles. Several skin tags scattered on her face, neck, and chest.  HEENT: Head normocephalic. No masses, lesions, tenderness or abnormalities. Pupils equal, round, reactive to light. Extraocular movements intact. Sclerae white. No conjunctival erythema, exudate or swelling. Normal lids. External ears normal. Canals clear. Tympanic membranes normal. Nose normal. Lips, mucosa, and tongue normal. Teeth and gums normal. Oropharynx clear. NECK: Supple. No cervical or supraclavicular lymphadenopathy. No thyromegaly. No carotid bruit. CHEST: Symmetrical. No chest deformities noted and no chest wall tenderness. BREASTS: Breasts symmetric. No dominant or suspicious mass. No skin or nipple changes and no axillary adenopathy. BACK: Straight, symmetric and nontender to palpation. LUNGS: Clear to auscultation bilaterally with easy respiratory effort. CARDIOVASCULAR: Regular rate and rhythm without murmur. ABDOMEN: Soft, nontender, nondistended. No masses palpated. No hepatosplenomegaly  Normal active bowel sounds. EXTREMITIES: No clubbing, cyanosis, edema or deformity noted. MUSCULOSKELETAL: Patient has 5/5 strength in all 4 extremities. Full range of motion in 4 extremities. Normal gait and station. NEUROLOGIC: Cranial nerves II-XII intact. Sensory and motor grossly intact. Reflexes normal and symmetric. Diabetic Foot Exam Documentation     Normal Bilateral Foot Exam: Skin integrity is normal. Dorsalis pedis and posterior tibial pulses are present. Pressure sensation using the [place] monofilament is present. ASSESSMENT:   1. Well female exam without gynecological exam    2. Breast cancer screening    3. Laboratory examination ordered as part of a routine general medical examination    4. Dyslipidemia    5. Essential hypertension    6.  Type 2 diabetes mellitus without complication, without long-term current use of insulin (CMS/[facility])    7. Acrochordon        PLAN:    Will check labs as per orders. She will resume her antihypertensive. We discussed importance of taking her medication daily as directed and to follow-up prior to running out of her medication. Diet, exercise, and weight loss discussed and strongly recommended. Mammogram ordered. She will follow-up with gynecology. Will refer to dermatology. She will be due for an eye exam 8/2019. She will follow-up with me in 6 months, sooner if needed. Annual physical recommended. Orders Placed This Encounter   â¢ OFFICE/OUTPT VISIT EST LEVEL IV   â¢ MAMMO Screen w Antonino Bilateral   â¢ Glycohemoglobin   â¢ Comprehensive Metabolic Panel   â¢ Lipid Panel with Reflex   â¢ Vitamin D -25 Hydroxy   â¢ Thyroid Stimulating Hormone Reflex   â¢ Microalbumin Urine Random   â¢ SERVICE TO DERMATOLOGY   â¢ ibuprofen (MOTRIN) 800 MG tablet   â¢ amoxicillin (AMOXIL) 500 MG tablet   â¢ losartan (COZAAR) 25 MG tablet       Follow up as needed. Return to clinic if symptoms persist, worsen, or new symptoms develop. Medication risk and benefits, proper use, and potential side effects discussed. Patient verbalizes understanding. Instructions provided as documented in the after visit summary. Â   The patient indicated understanding of the diagnosis and agreed with the plan of care. details… cranial nerves II-XII intact/responds to verbal commands

## 2023-06-07 NOTE — H&P ADULT - HISTORY OF PRESENT ILLNESS
72yo Female with MHx of HTN, HLD, DM Type 2, H. Pylori on triple Abx therapy, c/o SOB, progressively worse w/ exertion over past 2-3 weeks. Pt also recently underwent CT scan revealing mass in stomach c/w possible neoplasm. pt has been having black stools/intermittent constipation, and now significant fatigue and lightheadedness. Reports no calf pain, leg swelling, hx of DVT/PE, recent travel, smoking, or surgeries. Pt also reports no chest pain, numbness, tingling, vision changes, headaches, fevers, cough, sore throat, n/v/d.   72yo Female with MHx of HTN, HLD, DM Type 2, PPM (unknown indication), H. Pylori on triple Abx therapy, c/o SOB, progressively worse w/ exertion over past 2-3 weeks. Pt also recently underwent CT scan revealing mass in stomach c/w possible neoplasm. pt has been having black stools/intermittent constipation, and now significant fatigue and lightheadedness. Reports no calf pain, leg swelling, hx of DVT/PE, recent travel, smoking, or surgeries. Pt also reports no chest pain, numbness, tingling, vision changes, headaches, fevers, cough, sore throat, n/v/d.   72yo Female with MHx of HTN, HLD, DM Type 2, AVB s/p PPM , H. Pylori on triple Abx therapy, c/o SOB, progressively worse w/ exertion over past 2-3 weeks. Pt also recently underwent CT scan revealing "mass" in the stomach c/w possible neoplasm. pt has been having black stools/intermittent constipation, and now significant fatigue and lightheadedness. Reports no calf pain, leg swelling, hx of DVT/PE, recent travel, smoking, or surgeries. Pt also reports no chest pain, numbness, tingling, vision changes, headaches, fevers, cough, sore throat, n/v/d.    Pt does not have imaging reports with her and cannot recall her home medications or Abx taking.   [facility] she was evaluated by GI, Dr. Gurwinder Rosado, on Feb 5, 2023, who referred the pt for CT Abd w/ contrast to evaluate her Abd pain; Pt also was evaluated by Cardiology, Dr. Bean, on Mar 28, 2023, for atrial tachycardia, paroxysmal, AV block, AV block, complete , she was referred for Transthoracic Echocardiogram;    72yo Female with MHx of HTN, HLD, DM Type 2, AVB s/p PPM , H. Pylori on triple Abx therapy, c/o SOB, progressively worse w/ exertion over past 2-3 weeks. Pt also recently underwent CT scan revealing "mass" in the stomach c/w possible neoplasm. pt has been having black stools/intermittent constipation, and now significant fatigue and lightheadedness. Reports no calf pain, leg swelling, hx of DVT/PE, recent travel, smoking, or surgeries. Pt also reports no chest pain, numbness, tingling, vision changes, headaches, fevers, cough, sore throat, n/v/d.    Pt does not have imaging reports with her, and cannot recall her home medications. Pt unable to state whether she completed Abx therapy for H pylori or still taking Abx. Sayts that her daughter will come to the hospital in the morning.     Per my [facility] review on this admission, she was evaluated by GI, Dr. Gurwinder Rosado, on Feb 5, 2023, who referred the pt for CT Abd w/ contrast to evaluate her Abd pain; Pt also was evaluated by Cardiology, Dr. Bean, on Mar 28, 2023, for atrial tachycardia, paroxysmal, AV block, AV block, complete , she was referred for Transthoracic Echocardiogram;

## 2023-06-07 NOTE — H&P ADULT - MUSCULOSKELETAL
details… no joint swelling/no joint erythema/no calf tenderness/strength 5/5 bilateral upper extremities/strength 5/5 bilateral lower extremities

## 2023-06-07 NOTE — H&P ADULT - NSHPREVIEWOFSYSTEMS_GEN_ALL_CORE
· Accu-Chek Softclix Lancets; TEST ONCE  A  DAY   · Aspirin 81 MG Oral Tablet Delayed Release; TAKE 1 TABLET DAILY   · Atorvastatin Calcium 20 MG Oral Tablet; 1 TABLET BY MOUTH EVERY DAY   · Contour Next Test In Vitro Strip; TEST DAILY AS DIRECTED   · Januvia 100 MG Oral Tablet; TAKE 1 TABLET DAILY   · Metoprolol Succinate ER 50 MG Oral Tablet Extended Release 24 Hour; TAKE 1 TABLET  BY MOUTH EVERY DAY   · Valsartan 320 MG Oral Tablet; TAKE 1 TABLET BY MOUTH EVERY DAY

## 2023-06-07 NOTE — H&P ADULT - ASSESSMENT
72yo Female with MHx of HTN, HLD, DM Type 2, H. Pylori on triple Abx therapy a/w    No pulmonary embolism.  3.7 x 3.2 cm indeterminant left upper retroperitoneal mass. Further   evaluation can be performed with MRI. 72yo Female with MHx of HTN, HLD, DM Type 2, AVB s/p PPM , H. Pylori on triple Abx therapy a/w SOB of unclear etiology, and epigastric pain; Found to have new 3.7 x 3.2 cm indeterminant left upper retroperitoneal mass;

## 2023-06-07 NOTE — H&P ADULT - PROBLEM SELECTOR PLAN 2
Outpt CT A/P reportedly significant for gastric mass;   Reach out to PCP regarding formal CT report   Will defer GI c/s, Dr. Gurwinder Rosado, for potential EGD with biopsy to the primary team once CT Abd/Plv report can be confirmed   Pain control: Tylenol for now

## 2023-06-07 NOTE — H&P ADULT - PROBLEM SELECTOR PLAN 4
BK=[id number]  Hold oral agents;  -ISS  -DM diet  -A1c Suspect SIADH iso malignancy and Abd pain/ nausea   -TSH in AM   -Osm urine. serum ordered   -Monitor response to IV fluids, 1L NS in ED

## 2023-06-07 NOTE — H&P ADULT - PROBLEM SELECTOR PLAN 6
-Clarify with GI c/s, Dr. Gurwinder Rosado, if the pt is still on H pylori triple therapy or completed it;

## 2023-06-08 NOTE — PROCEDURE NOTE - ADDITIONAL PROCEDURE DETAILS
Dual Chamber pacemaker in DDDR mode   Normal sensing and pacing via iterative testing  Excellent threshold capture  PAT noted longest lasting 3 minutes   No reprogramming

## 2023-06-08 NOTE — PROGRESS NOTE ADULT - PROBLEM SELECTOR PLAN 7
Hold Heparin DVT due to possible GI iso GI  mass  SCDs DVT ppc: lovenox   Diet: Consistent carbs, NPO at midnight for procedure    Dispo: medically active

## 2023-06-08 NOTE — PROGRESS NOTE ADULT - PROBLEM SELECTOR PLAN 6
-Clarify with GI c/s, Dr. Gurwinder Rosado, if the pt is still on H pylori triple therapy or completed it; - called daughter this morning, patient is bringing in all home medications sometime this afternoon, will f/u

## 2023-06-08 NOTE — PROGRESS NOTE ADULT - SUBJECTIVE AND OBJECTIVE BOX
Patient is a 73y old  Female who presents with a chief complaint of Abdominal pain (07 Jun 2023 20:42)     INTERVAL HPI/OVERNIGHT EVENTS:  - No acute events overnight    SUBJECTIVE  - Patient seen and evaluated at bedside  - Patient reports presence of   - Patient reports absence of fevers, chills, HA, lightheadedness, dizziness, nausea, emesis, chest pain, dyspnea, palpitations, abd pain, diarrhea, urinary symptoms, skin color changes or rashes, or LE edema     MEDICATIONS  (STANDING):  aspirin enteric coated 81 milliGRAM(s) Oral daily  atorvastatin 20 milliGRAM(s) Oral at bedtime  dextrose 5%. 1000 milliLiter(s) (50 mL/Hr) IV Continuous <Continuous>  dextrose 5%. 1000 milliLiter(s) (100 mL/Hr) IV Continuous <Continuous>  dextrose 50% Injectable 25 Gram(s) IV Push once  dextrose 50% Injectable 12.5 Gram(s) IV Push once  dextrose 50% Injectable 25 Gram(s) IV Push once  glucagon  Injectable 1 milliGRAM(s) IntraMuscular once  insulin lispro (ADMELOG) corrective regimen sliding scale   SubCutaneous three times a day before meals  insulin lispro (ADMELOG) corrective regimen sliding scale   SubCutaneous three times a day before meals  metoprolol succinate ER 50 milliGRAM(s) Oral daily  valsartan 320 milliGRAM(s) Oral daily    MEDICATIONS  (PRN):  acetaminophen     Tablet .. 650 milliGRAM(s) Oral every 6 hours PRN Temp greater or equal to 38C (100.4F), Mild Pain (1 - 3)  dextrose Oral Gel 15 Gram(s) Oral once PRN Blood Glucose LESS THAN 70 milliGRAM(s)/deciliter  melatonin 3 milliGRAM(s) Oral at bedtime PRN Insomnia  ondansetron Injectable 4 milliGRAM(s) IV Push every 8 hours PRN Nausea and/or Vomiting        REVIEW OF SYSTEMS: As indicated above; otherwise, negative    VITAL SIGNS:  T(F): 97.7 (06-08-23 @ 05:30), Max: 100.3 (06-07-23 @ 23:30)  HR: 87 (06-08-23 @ 05:30) (83 - 90)  BP: 121/67 (06-08-23 @ 05:30) (112/50 - 140/66)  RR: 18 (06-08-23 @ 05:30) (17 - 18)  SpO2: 99% (06-08-23 @ 05:30) (99% - 100%)    PHYSICAL EXAM:  General: NAD, well-groomed, well-developed  Eyes: Conjunctiva and sclera clear  ENMT: Moist mucous membranes  Neck: No palpable pre-auricular, post-auricular, occipital, mandibular, submental, supra-clavicular, or infra-clavicular lymph nodes   Chest: Clear to auscultation bilaterally; no rales, rhonchi, or wheezing  Heart: Regular rate and rhythm; normal S1 and S2; no murmurs, rubs, or gallops  Abd: Soft, nontender, nondistended  Nervous System: AAOX3  Psych: Appropriate affect  Ext: no peripheral LE edema bilaterally    LABS:                        11.6   10.67 )-----------( 204      ( 07 Jun 2023 15:02 )             36.5     08 Jun 2023 02:00    x      |  x      |  x      ----------------------------<  324    x       |  x      |  x        Ca    9.0        07 Jun 2023 15:02    TPro  7.4    /  Alb  3.0    /  TBili  0.6    /  DBili  x      /  AST  49     /  ALT  27     /  AlkPhos  80     07 Jun 2023 15:02  PT/INR - ( 07 Jun 2023 15:02 )   PT: 16.2 sec;   INR: 1.39 ratio         PTT - ( 07 Jun 2023 15:02 )  PTT:26.8 sec  CAPILLARY BLOOD GLUCOSE      POCT Blood Glucose.: 311 mg/dL (08 Jun 2023 01:14)  POCT Blood Glucose.: 189 mg/dL (07 Jun 2023 16:24)    BLOOD CULTURE    RADIOLOGY & ADDITIONAL TESTS:    Imaging Personally Reviewed:  [X ] YES     Consultant(s) Notes Reviewed:  Yes    Care Discussed with Consultants/Other Providers: Yes Patient is a 73y old  Female who presents with a chief complaint of Abdominal pain (07 Jun 2023 20:42)     INTERVAL HPI/OVERNIGHT EVENTS:  - No acute events overnight    SUBJECTIVE  - Patient seen and evaluated at bedside. Patient states that she feels weak and tired. Patient states that her legs feel weak and she is not able to move around as much. Patient states that while lying in bed she does not feel short of breath. Patient denies any fever, chills, chest pain, nausea, vomiting, constipation, or diarrhea.     MEDICATIONS  (STANDING):  aspirin enteric coated 81 milliGRAM(s) Oral daily  atorvastatin 20 milliGRAM(s) Oral at bedtime  dextrose 5%. 1000 milliLiter(s) (50 mL/Hr) IV Continuous <Continuous>  dextrose 5%. 1000 milliLiter(s) (100 mL/Hr) IV Continuous <Continuous>  dextrose 50% Injectable 25 Gram(s) IV Push once  dextrose 50% Injectable 12.5 Gram(s) IV Push once  dextrose 50% Injectable 25 Gram(s) IV Push once  glucagon  Injectable 1 milliGRAM(s) IntraMuscular once  insulin lispro (ADMELOG) corrective regimen sliding scale   SubCutaneous three times a day before meals  insulin lispro (ADMELOG) corrective regimen sliding scale   SubCutaneous three times a day before meals  metoprolol succinate ER 50 milliGRAM(s) Oral daily  valsartan 320 milliGRAM(s) Oral daily    MEDICATIONS  (PRN):  acetaminophen     Tablet .. 650 milliGRAM(s) Oral every 6 hours PRN Temp greater or equal to 38C (100.4F), Mild Pain (1 - 3)  dextrose Oral Gel 15 Gram(s) Oral once PRN Blood Glucose LESS THAN 70 milliGRAM(s)/deciliter  melatonin 3 milliGRAM(s) Oral at bedtime PRN Insomnia  ondansetron Injectable 4 milliGRAM(s) IV Push every 8 hours PRN Nausea and/or Vomiting        REVIEW OF SYSTEMS: As indicated above; otherwise, negative    VITAL SIGNS:  T(F): 97.7 (06-08-23 @ 05:30), Max: 100.3 (06-07-23 @ 23:30)  HR: 87 (06-08-23 @ 05:30) (83 - 90)  BP: 121/67 (06-08-23 @ 05:30) (112/50 - 140/66)  RR: 18 (06-08-23 @ 05:30) (17 - 18)  SpO2: 99% (06-08-23 @ 05:30) (99% - 100%)    PHYSICAL EXAM:  General: NAD, well-groomed, well-developed  Eyes: Pupils equal, round, reactive to light, EOMI, anicteric sclera   ENMT: slightly dry mucous membranes  Neck: supple, no JVD, no tender lymphadenopathy   Chest: Clear to auscultation bilaterally; no rales, rhonchi, or wheezing  Heart: Regular rate and rhythm; normal S1 and S2; no murmurs, rubs, or gallops  Abdomen: Soft, diffusely tender to lightly palpation, nondistended, normoactive bowel sounds   Extremities: Warm, well perfused, no peripheral LE edema bilaterally, 4/5 strength in b/l lower extremities  Nervous System: AOx3, no focal neurological deficits   Psych: Appropriate affect      LABS:                        11.6   10.67 )-----------( 204      ( 07 Jun 2023 15:02 )             36.5     08 Jun 2023 02:00    x      |  x      |  x      ----------------------------<  324    x       |  x      |  x        Ca    9.0        07 Jun 2023 15:02    TPro  7.4    /  Alb  3.0    /  TBili  0.6    /  DBili  x      /  AST  49     /  ALT  27     /  AlkPhos  80     07 Jun 2023 15:02  PT/INR - ( 07 Jun 2023 15:02 )   PT: 16.2 sec;   INR: 1.39 ratio         PTT - ( 07 Jun 2023 15:02 )  PTT:26.8 sec  CAPILLARY BLOOD GLUCOSE      POCT Blood Glucose.: 311 mg/dL (08 Jun 2023 01:14)  POCT Blood Glucose.: 189 mg/dL (07 Jun 2023 16:24)    BLOOD CULTURE    RADIOLOGY & ADDITIONAL TESTS:    Imaging Personally Reviewed:  [X ] YES     Consultant(s) Notes Reviewed:  Yes    Care Discussed with Consultants/Other Providers: Yes

## 2023-06-08 NOTE — PATIENT PROFILE ADULT - FALL HARM RISK - UNIVERSAL INTERVENTIONS
Bed in lowest position, wheels locked, appropriate side rails in place/Call bell, personal items and telephone in reach/Instruct patient to call for assistance before getting out of bed or chair/Non-slip footwear when patient is out of bed/[place] to call system/Physically safe environment - no spills, clutter or unnecessary equipment/Purposeful Proactive Rounding/Room/bathroom lighting operational, light cord in reach

## 2023-06-08 NOTE — CHART NOTE - NSCHARTNOTEFT_GEN_A_CORE
RCRI:  High risk surgery: Y [ ] / N [x]  Hx of ischemic heart dz: Y [ ] / N [x]  Hx of CHF: Y [x] / N [ ]*  Hx of CVA: Y [ ] / N [x]  DM with insulin: Y [ ] / N [x]  CKD w/ Cr > 2: Y [ ] / N [x]  Total Score: 1  30 day risk of death, MI, cardiac arrest. 6%    History of CHF is questionable as well so risk is likely lower    Hx of valvular disease: Y [ ] / N [x]  [place] perioperative risk of MI, cardiac arrest: 0.6%    < from: Transthoracic Echocardiogram (03.29.23 @ 09:11) >    CONCLUSIONS:  1. Mitral annular calcification, otherwise normal mitral  valve. Mild mitral regurgitation.  2. Normal left ventricular internal dimensions and wall  thicknesses.  3. Normal left ventricular systolic function. No segmental  wall motion abnormalities.  4. Mild diastolic dysfunction (Stage I).  5. The right ventricle is not well visualized; grossly  normal right ventricular systolic function.  A device wire  is noted in the right heart.  6. Normal tricuspid valve. Mild tricuspid regurgitation.  7. Estimated pulmonary artery systolic pressure equals 36  mm Hg, assuming right atrial pressure equals 10  mm Hg,  consistent with borderline pulmonary hypertension.    < end of copied text >    EKG: V paced rhythm    PPM interrogated on 6/8/23 showing V paced 99% of the time with normal sensing and pacing with excellent threshold capture.     Able to walk >4 METS without any anginal symptoms    Patient likely with pseudohyponatremia vs SIADH, Na improving to 128 regardless, will treat empirically with salt tabs to increase to >130 for anesthesia request.    Patient is medically optimized for procedure. No further cardiac workup is necessary.

## 2023-06-08 NOTE — PROGRESS NOTE ADULT - PROBLEM SELECTOR PLAN 4
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 serum ordered   -Monitor response to IV fluids, 1L NS in ED Suspect pseudohyponatremia as serum osmolarity is 276. Possibly related to gamma gap    - TSH wnl  - continue to monitor Na level

## 2023-06-08 NOTE — CONSULT NOTE ADULT - SUBJECTIVE AND OBJECTIVE BOX
HPI: 73F Hx of HTN, HLD, DM Type 2, AVB s/p PPM , H. Pylori on triple Abx therapy, c/o SOB, progressively worse w/ exertion over past 2-3 weeks. Pt also recently underwent CT scan revealing "mass" in the stomach c/w possible neoplasm. pt has been having black stools/intermittent constipation, and now significant fatigue and lightheadedness. Reports no calf pain, leg swelling, hx of DVT/PE, recent travel, smoking, or surgeries. Pt also reports no chest pain, numbness, tingling, vision changes, headaches, fevers, cough, sore throat, n/v/d.    Pt does not have imaging reports with her, and cannot recall her home medications. Pt unable to state whether she completed Abx therapy for H pylori or still taking Abx. Sayts that her daughter will come to the hospital in the morning.     Per my Allsripts review on this admission, she was evaluated by GI, Dr. Gurwinder Rosado, on Feb 5, 2023, who referred the pt for CT Abd w/ contrast to evaluate her Abd pain; Pt also was evaluated by Cardiology, Dr. Bean, on Mar 28, 2023, for atrial tachycardia, paroxysmal, AV block, AV block, complete , she was referred for Transthoracic Echocardiogram;   Allergies:  No Known Allergies      Home Medications:    Hospital Medications:  acetaminophen     Tablet .. 650 milliGRAM(s) Oral every 6 hours PRN  aspirin enteric coated 81 milliGRAM(s) Oral daily  atorvastatin 20 milliGRAM(s) Oral at bedtime  dextrose 5%. 1000 milliLiter(s) IV Continuous <Continuous>  dextrose 5%. 1000 milliLiter(s) IV Continuous <Continuous>  dextrose 50% Injectable 25 Gram(s) IV Push once  dextrose 50% Injectable 12.5 Gram(s) IV Push once  dextrose 50% Injectable 25 Gram(s) IV Push once  dextrose Oral Gel 15 Gram(s) Oral once PRN  enoxaparin Injectable 40 milliGRAM(s) SubCutaneous every 24 hours  glucagon  Injectable 1 milliGRAM(s) IntraMuscular once  insulin lispro (ADMELOG) corrective regimen sliding scale   SubCutaneous Before meals and at bedtime  melatonin 3 milliGRAM(s) Oral at bedtime PRN  metoprolol succinate ER 50 milliGRAM(s) Oral daily  ondansetron Injectable 4 milliGRAM(s) IV Push every 8 hours PRN  valsartan 320 milliGRAM(s) Oral daily      PMHX/PSHX:  HTN (Hypertension)    Backpain    HTN (Hypertension)    S/P Cataract Surgery    Retina        Family history:  No pertinent family history in first degree relatives        Denies family history of colon cancer/polyps, stomach cancer/polyps, pancreatic cancer/masses, liver cancer/disease, ovarian cancer and endometrial cancer.    Social History:     Tob: Denies  EtOH: Denies  Illicit Drugs: Denies    ROS:     General:  No wt loss, fevers, chills, night sweats, fatigue  Eyes:  Good vision, no reported pain  ENT:  No sore throat, pain, runny nose, dysphagia  CV:  No pain, palpitations, hypo/hypertension  Pulm:  No dyspnea, cough, tachypnea, wheezing  GI:  No pain, No nausea, No vomiting, No diarrhea, No constipation, No weight loss, No fever, No pruritis, No rectal bleeding, No tarry stools, No dysphagia,  :  No pain, bleeding, incontinence, nocturia  Muscle:  No pain, weakness  Neuro:  No weakness, tingling, memory problems  Psych:  No fatigue, insomnia, mood problems, depression  Endocrine:  No polyuria, polydipsia, cold/heat intolerance  Heme:  No petechiae, ecchymosis, easy bruisability  Skin:  No rash, tattoos, scars, edema    PHYSICAL EXAM:     GENERAL:  No acute distress  HEENT:  Normocephalic/atraumatic, no scleral icterus  CHEST:  Clear to auscultation bilaterally, no wheezes/rales/ronchi, no accessory muscle use  HEART:  Regular rate and rhythm, no murmurs/rubs/gallops  ABDOMEN:  Soft, non-tender, non-distended, normoactive bowel sounds,  no masses, no hepato-splenomegaly, no signs of chronic liver disease  EXTREMITIES: No cyanosis, clubbing, or edema  SKIN:  No rash/erythema/ecchymoses/petechiae/wounds/abscess/warm/dry  NEURO:  Alert and oriented x 3, no asterixis    Vital Signs:  Vital Signs Last 24 Hrs  T(C): 37 (08 Jun 2023 13:10), Max: 37.9 (07 Jun 2023 23:30)  T(F): 98.6 (08 Jun 2023 13:10), Max: 100.3 (07 Jun 2023 23:30)  HR: 77 (08 Jun 2023 13:10) (77 - 90)  BP: 126/63 (08 Jun 2023 13:10) (121/67 - 140/66)  BP(mean): --  RR: 18 (08 Jun 2023 13:10) (18 - 18)  SpO2: 98% (08 Jun 2023 13:10) (98% - 100%)    Parameters below as of 08 Jun 2023 13:10  Patient On (Oxygen Delivery Method): room air      Daily Height in cm: 167.64 (07 Jun 2023 23:30)    Daily     LABS:                        11.1   9.46  )-----------( 199      ( 08 Jun 2023 07:04 )             35.9     Mean Cell Volume: 85.7 fL (06-08-23 @ 07:04)    06-08    126<L>  |  96<L>  |  9   ----------------------------<  186<H>  3.9   |  22  |  0.80    Ca    8.9      08 Jun 2023 07:04  Phos  3.1     06-08  Mg     1.90     06-08    TPro  6.8  /  Alb  x   /  TBili  x   /  DBili  x   /  AST  x   /  ALT  x   /  AlkPhos  x   06-08    LIVER FUNCTIONS - ( 08 Jun 2023 10:00 )  Alb: x     / Pro: 6.8 g/dL / ALK PHOS: x     / ALT: x     / AST: x     / GGT: x           PT/INR - ( 07 Jun 2023 15:02 )   PT: 16.2 sec;   INR: 1.39 ratio         PTT - ( 07 Jun 2023 15:02 )  PTT:26.8 sec                            11.1   9.46  )-----------( 199      ( 08 Jun 2023 07:04 )             35.9                         11.6   10.67 )-----------( 204      ( 07 Jun 2023 15:02 )             36.5       Imaging:             HPI: 73F Hx of HTN, HLD, DM Type 2, AVB s/p PPM, H. Pylori (currently on triple therapy), c/o SOB, progressively worse w/ exertion over past 2-3 weeks. Found to have RP mass on CT a/p (previously seen in 2012), advanced GI consulted for biopsy.     Pt is a poor historian (called daughter x 2, no answer at this time). Currently reports some diffuse/left sided predominant abdominal pain, last BM 3 days ago. Denies n/v/f/c. Currently denies melena, per primary team daughter had reported black stools prior. Denies significant weight loss. Endorses fatigue and lightheadedness.      Upon chart review:   CT a/p (2012): 3.6 cm mass adjacent to the gastric cardia could be a primary gastric neoplasm such as a GIST tumor versus secondary to other etiology. Recommend GI consultation and possible endoscopic ultrasound.    EUS/FNA (2012): Mary Ellen-gastric lesion. 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 EUS-FNA was performed and cytology results are pending.  --> path: non-diagnostic          Allergies:  No Known Allergies      Home Medications:    Hospital Medications:  acetaminophen     Tablet .. 650 milliGRAM(s) Oral every 6 hours PRN  aspirin enteric coated 81 milliGRAM(s) Oral daily  atorvastatin 20 milliGRAM(s) Oral at bedtime  dextrose 5%. 1000 milliLiter(s) IV Continuous <Continuous>  dextrose 5%. 1000 milliLiter(s) IV Continuous <Continuous>  dextrose 50% Injectable 25 Gram(s) IV Push once  dextrose 50% Injectable 12.5 Gram(s) IV Push once  dextrose 50% Injectable 25 Gram(s) IV Push once  dextrose Oral Gel 15 Gram(s) Oral once PRN  enoxaparin Injectable 40 milliGRAM(s) SubCutaneous every 24 hours  glucagon  Injectable 1 milliGRAM(s) IntraMuscular once  insulin lispro (ADMELOG) corrective regimen sliding scale   SubCutaneous Before meals and at bedtime  melatonin 3 milliGRAM(s) Oral at bedtime PRN  metoprolol succinate ER 50 milliGRAM(s) Oral daily  ondansetron Injectable 4 milliGRAM(s) IV Push every 8 hours PRN  valsartan 320 milliGRAM(s) Oral daily      PMHX/PSHX:  HTN (Hypertension)    Backpain    HTN (Hypertension)    S/P Cataract Surgery    Retina        Family history:  No pertinent family history in first degree relatives        Denies family history of colon cancer/polyps, stomach cancer/polyps, pancreatic cancer/masses, liver cancer/disease, ovarian cancer and endometrial cancer.    Social History:     Tob: Denies  EtOH: Denies  Illicit Drugs: Denies    ROS:     General:  No wt loss, fevers, chills, night sweats, fatigue  Eyes:  Good vision, no reported pain  ENT:  No sore throat, pain, runny nose, dysphagia  CV:  No pain, palpitations, hypo/hypertension  Pulm:  No dyspnea, cough, tachypnea, wheezing  GI:  see above  :  No pain, bleeding, incontinence, nocturia  Muscle:  No pain, weakness  Neuro:  No weakness, tingling, memory problems  Psych:  No fatigue, insomnia, mood problems, depression  Endocrine:  No polyuria, polydipsia, cold/heat intolerance  Heme:  No petechiae, ecchymosis, easy bruisability  Skin:  No rash, tattoos, scars, edema    PHYSICAL EXAM:     GENERAL:  No acute distress  HEENT:  Normocephalic/atraumatic, no scleral icterus  CHEST:   no accessory muscle use  HEART:  Regular rate and rhythm, no murmurs/rubs/gallops  ABDOMEN:  Soft, non-tender, non-distended  EXTREMITIES: No cyanosis, clubbing, or edema  SKIN:  No rash/erythema/ecchymoses/petechiae/wounds/abscess/warm/dry  NEURO:  Alert and oriented x 3, no asterixis    Vital Signs:  Vital Signs Last 24 Hrs  T(C): 37 (08 Jun 2023 13:10), Max: 37.9 (07 Jun 2023 23:30)  T(F): 98.6 (08 Jun 2023 13:10), Max: 100.3 (07 Jun 2023 23:30)  HR: 77 (08 Jun 2023 13:10) (77 - 90)  BP: 126/63 (08 Jun 2023 13:10) (121/67 - 140/66)  BP(mean): --  RR: 18 (08 Jun 2023 13:10) (18 - 18)  SpO2: 98% (08 Jun 2023 13:10) (98% - 100%)    Parameters below as of 08 Jun 2023 13:10  Patient On (Oxygen Delivery Method): room air      Daily Height in cm: 167.64 (07 Jun 2023 23:30)    Daily     LABS:                        11.1   9.46  )-----------( 199      ( 08 Jun 2023 07:04 )             35.9     Mean Cell Volume: 85.7 fL (06-08-23 @ 07:04)    06-08    126<L>  |  96<L>  |  9   ----------------------------<  186<H>  3.9   |  22  |  0.80    Ca    8.9      08 Jun 2023 07:04  Phos  3.1     06-08  Mg     1.90     06-08    TPro  6.8  /  Alb  x   /  TBili  x   /  DBili  x   /  AST  x   /  ALT  x   /  AlkPhos  x   06-08    LIVER FUNCTIONS - ( 08 Jun 2023 10:00 )  Alb: x     / Pro: 6.8 g/dL / ALK PHOS: x     / ALT: x     / AST: x     / GGT: x           PT/INR - ( 07 Jun 2023 15:02 )   PT: 16.2 sec;   INR: 1.39 ratio         PTT - ( 07 Jun 2023 15:02 )  PTT:26.8 sec                            11.1   9.46  )-----------( 199      ( 08 Jun 2023 07:04 )             35.9                         11.6   10.67 )-----------( 204      ( 07 Jun 2023 15:02 )             36.5       Imaging:

## 2023-06-08 NOTE — PROGRESS NOTE ADULT - PROBLEM SELECTOR PLAN 1
Unclear etiology; Not wheezing on exam; Possible HFpEF, mild;   CXR: clear lungs, no PTX or effusions  CTA chest: no PE;   Recent TTE, 29/3/23: Mild diastolic dysfunction (Stage I) and EF=62%  Elevated Pro-BNP, 486 (maybe normal for age), Trop 13 x2  EKG: LBBB, not acute   Formal Cardiology c/s in AM, Dr. Bean   c/w BB, ASA, Statin   Formal EP c/s for PPM interrogation in AM  VBG  in AM  a1c, lipid panel, TSH Unclear etiology; Not wheezing on exam; Possible HFpEF, mild;   CXR: clear lungs, no PTX or effusions  CTA chest: no PE  Recent TTE, 29/3/23: Mild diastolic dysfunction (Stage I) and EF=62%  Elevated Pro-BNP, 486 (maybe normal for age), Trop 13 x2  EKG: LBBB, not acute   c/w BB, ASA, Statin   EP consulted for PPM interrogation, will f/u  a1c was 6.1  TSH wnl

## 2023-06-08 NOTE — PROGRESS NOTE ADULT - ATTENDING COMMENTS
73F with PMH of HTN, HLD, DM2, AVB s/p PPM, H pylori on triple therapy here w/ SOB/wt loss/abd pain. Imaging concerning for RP mass. ADmitted for further evaluation. GI on board.    Pt seen at bedside. No o/n events. Reports similar, long standing abd discomfort. Poor PO.     -GI recs appreciated. Anticipate upper endoscopy tomorrow.  -Sodium already improving. Poss SIADH. Labs in AM.   -Nutrition eval.    Rest of plan as above.

## 2023-06-08 NOTE — PROGRESS NOTE ADULT - PROBLEM SELECTOR PLAN 2
Outpt CT A/P reportedly significant for gastric mass;   Reach out to PCP regarding formal CT report   Will defer GI c/s, Dr. Gurwinder Rosado, for potential EGD with biopsy to the primary team once CT Abd/Plv report can be confirmed   Pain control: Tylenol for now Outpt CT A/P reportedly significant for gastric mass;   CT Abdomen and Pelvis with contrast from 6/5 showed 4.1cm x 3.2cm structure posterior to the stomach with crescentic enhancing component. 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 Gurwinder Rosado, does not see patients at [facility], consulted in house GI for possible EUS with biopsy

## 2023-06-08 NOTE — PROGRESS NOTE ADULT - ASSESSMENT
74yo Female with MHx of HTN, HLD, DM Type 2, AVB s/p PPM , H. Pylori on triple Abx therapy a/w SOB of unclear etiology, and epigastric pain; Found to have new 3.7 x 3.2 cm indeterminant left upper retroperitoneal mass;               72yo Female with MHx of HTN, HLD, DM Type 2, AVB s/p PPM , H. Pylori on triple Abx therapy a/w SOB of unclear etiology, and epigastric pain; Found to have new 3.7 x 3.2 cm indeterminant left upper retroperitoneal mass;

## 2023-06-08 NOTE — PROGRESS NOTE ADULT - PROBLEM SELECTOR PLAN 5
VL=576  Hold oral agents;  -ISS  -DM diet  -A1c Blood glucose level on admission 189  - holding oral home medications  - will continue with DREW  - A1c was 6.1

## 2023-06-08 NOTE — CONSULT NOTE ADULT - ASSESSMENT
73F Hx of HTN, HLD, DM Type 2, AVB s/p PPM , H. Pylori on triple Abx therapy, c/o SOB, progressively worse w/ exertion over past 2-3 weeks found to have CT scan revealing RP mass.       Impression:  #RP Mass    #Hyponatremia    Recommendations:   - Obtain cardiology clearance/optimization  - Can offer EUS with FNB once pt medically optimized (needs Na > 130)  - Full note to follow         Thank you for involving us in the care of this patient, please reach out if any further questions.     Wayne Inman MD  Gastroenterology/Hepatology Fellow, PGY6    Available on [employer] Teams  [phone] ([facility])  [phone] ([facility])  Please contact on call fellow weekdays after 5pm-7am and weekends: [phone]  ' 73F Hx of HTN, HLD, DM Type 2, AVB s/p PPM , H. Pylori on triple Abx therapy, c/o SOB, progressively worse w/ exertion over past 2-3 weeks. Found to have RP mass on CT a/p, which was previously seen + underwent EUS/FNA with non-diagnostic path. Advanced GI now consulted for biopsy.     Impression:  #RP Mass: indeterminant left upper retroperitoneal mass measures 3.7 x 3.2 cm in between the gastric fundus and and spleen.  --Previously seen on CT a/p in 2012 + currently appears to be of similar size. 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 Underwent EUS/FNA in 2012: showed lesion was hypoechoic and had a layered wall and posterior enhancement c/w cystic lesion + non-diagnostic cytology from FNA    #Hyponatremia: improving + likely some component of pseudohyponatremia in setting of hyperglycemia      #Hx H pylori, currently on triple tx      Recommendations:   - Obtain cardiology clearance/optimization + PPM eval  - Can offer EUS with FNB once pt medically optimized (needs Na > 130)  - Will tentatively consider EGD/EUS with FNB for tomorrow  - NPO after MN           Thank you for involving us in the care of this patient, please reach out if any further questions.     Wayne Inman MD  Gastroenterology/Hepatology Fellow, PGY6    Available on [employer] Teams  [phone] ([facility])  [phone] ([facility])  Please contact on call fellow weekdays after 5pm-7am and weekends: [phone]  '

## 2023-06-09 NOTE — PROGRESS NOTE ADULT - PROBLEM SELECTOR PLAN 4
Suspect pseudohyponatremia as serum osmolarity is 276. Possibly related to gamma gap    - TSH wnl  - continue to monitor Na level

## 2023-06-09 NOTE — DISCHARGE NOTE PROVIDER - NSDCFUSCHEDAPPT_GEN_ALL_CORE_FT
[facility] Physician [facility] [phone]  Scheduled Appointment: 07/05/2023

## 2023-06-09 NOTE — DISCHARGE NOTE PROVIDER - PROVIDER TOKENS
PROVIDER:[TOKEN:[[id number]],FOLLOWUP:[1 week],ESTABLISHEDPATIENT:[T]] PROVIDER:[TOKEN:[[id number]],FOLLOWUP:[1 week],ESTABLISHEDPATIENT:[T]],PROVIDER:[[id number]],FOLLOWUP:[1 week],ESTABLISHEDPATIENT:[T]]

## 2023-06-09 NOTE — PROGRESS NOTE ADULT - PROBLEM SELECTOR PLAN 1
Unclear etiology; Not wheezing on exam; Possible HFpEF, mild;   CXR: clear lungs, no PTX or effusions  CTA chest: no PE  Recent TTE, 29/3/23: Mild diastolic dysfunction (Stage I) and EF=62%  Elevated Pro-BNP, 486 (maybe normal for age), Trop 13 x2  EKG: LBBB, not acute   c/w BB, ASA, Statin   EP consulted for PPM interrogation, will f/u  a1c was 6.1  TSH wnl Outpt CT A/P reportedly significant for gastric mass;   CT Abdomen and Pelvis with contrast from 6/5 showed 4.1cm x 3.2cm structure posterior to the stomach with crescentic enhancing component. 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 Gurwinder Rosado, does not see patients at [facility], consulted in house GI for possible EUS with biopsy Outpt CT A/P reportedly significant for gastric mass;   CT Abdomen and Pelvis with contrast from 6/5 showed 4.1cm x 3.2cm structure posterior to the stomach with crescentic enhancing component. 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 Gurwinder Rosado, does not see patients at [facility], consulted in house GI for possible EUS with biopsy  - S/p EUS with biopsy on 6/9. During endoscopy, an FNA of a cystic lesion adjacent to the spleen was performed  - f/u cytology and path   - will resume prior diet and continue Levaquin for 2-3 days   - GI following, recs appreciated  - will continue patient's H. Pylori treatment, will be completed on 6/12

## 2023-06-09 NOTE — CONSULT NOTE ADULT - SUBJECTIVE AND OBJECTIVE BOX
Cardiology/Vascular Medicine Inpatient Consultation Note    HISTORY OF PRESENT ILLNESS:    Patient known to me from the office.    She is a 72 yo woman with HTN, HLD, DM Type 2, AVB s/p PPM, paroxysmal atrial tachycardia, H. Pylori on triple Abx therapy presents with recent progressive symptoms of exertional dyspnea over past 2-3 weeks.     Recent CT scan also noted possible gastric mass.  Patient notes having black stools and intermittent constipation.   Reports significant generalized fatigue and lightheadedness.            Allergies  No Known Allergies    MEDICATIONS:  aspirin enteric coated 81 milliGRAM(s) Oral daily  metoprolol succinate ER 50 milliGRAM(s) Oral daily  valsartan 320 milliGRAM(s) Oral daily  amoxicillin 750 milliGRAM(s) Oral three times a day  levoFLOXacin  Tablet 500 milliGRAM(s) Oral every 24 hours  acetaminophen     Tablet .. 650 milliGRAM(s) Oral every 6 hours PRN  melatonin 3 milliGRAM(s) Oral at bedtime PRN  ondansetron Injectable 4 milliGRAM(s) IV Push every 8 hours PRN  pantoprazole    Tablet 40 milliGRAM(s) Oral two times a day  atorvastatin 20 milliGRAM(s) Oral at bedtime  dextrose 50% Injectable 25 Gram(s) IV Push once  dextrose 50% Injectable 12.5 Gram(s) IV Push once  dextrose 50% Injectable 25 Gram(s) IV Push once  dextrose Oral Gel 15 Gram(s) Oral once PRN  glucagon  Injectable 1 milliGRAM(s) IntraMuscular once  insulin glargine Injectable (LANTUS) 3 Unit(s) SubCutaneous at bedtime  insulin lispro (ADMELOG) corrective regimen sliding scale   SubCutaneous Before meals and at bedtime  insulin lispro Injectable (ADMELOG) 1 Unit(s) SubCutaneous three times a day before meals  dextrose 5%. 1000 milliLiter(s) IV Continuous <Continuous>  dextrose 5%. 1000 milliLiter(s) IV Continuous <Continuous>  sodium chloride 1 Gram(s) Oral every 8 hours      PAST MEDICAL & SURGICAL HISTORY:  HTN (Hypertension)  Backpain  S/P Cataract Surgery  Retina s/p macular hole repair    FAMILY HISTORY:  No pertinent family history in first degree relatives    SOCIAL HISTORY:    As above, as per chart notes    REVIEW OF SYSTEMS:  As above, as per chart notes    PHYSICAL EXAM:  T(C): 36.8 (23 @ 07:50), Max: 37 (23 @ 13:10)  HR: 76 (23 @ 07:50) (76 - 94)  BP: 127/67 (23 @ 07:50) (126/63 - 132/70)  RR: 17 (23 @ 07:50) (17 - 18)  SpO2: 95% (23 @ 07:50) (95% - 98%)    Appearance: NAD  HEENT:   No apparent JVD  Cardiovascular: Normal S1 S2, No JVD, No murmurs, No edema  Respiratory: Lungs clear to auscultation	  Psychiatry: Awake, alert  Gastrointestinal:  Soft, Non-tender, + BS	  Neurologic: Non-focal  Extremities: No LE edema      LABS:	 	    CBC Full  -  ( 2023 03:00 )  WBC Count : 9.15 K/uL  Hemoglobin : 11.1 g/dL  Hematocrit : 34.6 %  Platelet Count - Automated : 188 K/uL  Mean Cell Volume : 83.8 fL  Mean Cell Hemoglobin : 26.9 pg  Mean Cell Hemoglobin Concentration : 32.1 gm/dL  Auto Neutrophil # : x  Auto Lymphocyte # : x  Auto Monocyte # : x  Auto Eosinophil # : x  Auto Basophil # : x  Auto Neutrophil % : x  Auto Lymphocyte % : x  Auto Monocyte % : x  Auto Eosinophil % : x  Auto Basophil % : x        128<L>  |  94<L>  |  9   ----------------------------<  163<H>  4.0   |  21<L>  |  0.73      128<L>  |  94<L>  |  10  ----------------------------<  138<H>  3.8   |  24  |  0.77    Ca    8.9      2023 03:00  Ca    8.9      2023 15:55  Phos  3.1       Phos  3.4       Mg     1.80       Mg     1.80         TPro  6.8  /  Alb  x   /  TBili  x   /  DBili  x   /  AST  x   /  ALT  x   /  AlkPhos  x     TPro  7.4  /  Alb  3.0<L>  /  TBili  0.6  /  DBili  x   /  AST  49<H>  /  ALT  27  /  AlkPhos  80      < from: Transthoracic Echocardiogram (23 @ 09:11) >    Patient name: CINTHYA MACDONALD  YOB: 1950   Age: 72 (F)   MR#: [id number]  Study Date: 3/29/2023  [facility]  Study quality: Technically good  Referring Physician: Kev Bean MD, PhD / Mariza Key MD  Blood Pressure: 128/81 mmHg  Height: 163 cm  Weight: 68 kg  BSA: 1.7 m2  ------------------------------------------------------------------------  PROCEDURE: Transthoracic echocardiogram with 2-D, M-Mode  and complete spectral and color flow Doppler.  INDICATION: Unspecified atrioventricular block (I44.30)  ------------------------------------------------------------------------  DIMENSIONS:  Dimensions:     Normal Values:  LA:     3.6 cm    2.0 - 4.0 cm  Ao:     2.8 cm    2.0 - 3.8 cm  SEPTUM: 0.8 cm    0.6 - 1.2 cm  PWT:    1.0 cm    0.6 - 1.1 cm  LVIDd:  4.0 cm    3.0 - 5.6 cm  LVIDs:  2.6 cm    1.8 - 4.0 cm  Derived Variables:  LVMI: 63 g/m2  RWT: 0.50  Fractional short: 35 %  Ejection Fraction (Modified Martino Rule): 62 %  ------------------------------------------------------------------------  OBSERVATIONS:  Mitral Valve: Mitral annular calcification, otherwise  normal mitral valve. Mild mitral regurgitation.  Aortic Root: Normal aortic root.  Aortic Valve: Calcified trileaflet aortic valve with normal  opening. No aortic valve regurgitation seen.  Left Atrium: Normal left atrium.  LA volume index = 28  cc/m2.  Left Ventricle: Normal left ventricular systolic function.  No segmental wall motion abnormalities. Normal left  ventricular internal dimensions and wall thicknesses. Mild  diastolic dysfunction (Stage I).  Right Heart: Normal right atrium. The right ventricle is  not well visualized; grossly normal right ventricular  systolic function.  A device wire is noted in the right  heart. Normal tricuspid valve. Mild tricuspid  regurgitation. Pulmonic valve not well visualized. Mild  pulmonic regurgitation.  Pericardium/PleuraNormal pericardium with no pericardial  effusion.  Hemodynamic: Estimated right ventricular systolic pressure  equals 36 mm Hg, assuming right atrial pressure equals 10  mm Hg, consistent with borderline pulmonary hypertension.  ------------------------------------------------------------------------  CONCLUSIONS:  1. Mitral annular calcification, otherwise normal mitral  valve. Mild mitral regurgitation.  2. Normal left ventricular internal dimensions and wall  thicknesses.  3. Normal left ventricular systolic function. No segmental  wall motion abnormalities.  4. Mild diastolic dysfunction (Stage I).  5. The right ventricle is not well visualized; grossly  normal right ventricular systolic function.  A device wire  is noted in the right heart.  6. Normal tricuspid valve. Mild tricuspid regurgitation.  7. Estimated pulmonary artery systolic pressure equals 36  mm Hg, assuming right atrial pressure equals 10  mm Hg,  consistent with borderline pulmonary hypertension.  ------------------------------------------------------------------------  Confirmed on  3/29/2023 - 16:39:11by Kev Bean MD, [facility],  [place], [facility]  ------------------------------------------------------------------------    < end of copied text >  < from: Nuclear Stress Test-Pharmacologic (19 @ 11:21) >    PATIENT: CINTHYA MACDONALD  : 1950   AGE: 69 (F)   MR#: [id number]  STUDY DATE: 2019  LOCATION: O/P  REF. PHYSICIAN(S): Jonah Gutierrez MD  FELLOW: GIAN Sage PA  ------------------------------------------------------------------------  TYPE OF TEST: Stress/Rest  Pharmacologic  INDICATION: Abnormal electrocardiogram (ECG) (EKG)  (R94.31)  ------------------------------------------------------------------------  HISTORY:  CARDIAC HISTORY: 69 year old female sent for cardiac  evaluation, past medical history hypertension, s/p PPM  RISK FACTORS: Hypertension, Post Menopausal  MEDICATIONS: diovan, metoprolol  ------------------------------------------------------------------------  BASELINE ELECTROCARDIOGRAM:  Rhythm: Normal Sinus Rhythm - 71 BPM  Conduction Defect: LBBB  ST: No significant ST abnormalities.  ------------------------------------------------------------------------  HEMODYNAMIC PARAMETERS:                                      HR      BP  Baseline  Pre-Injection             78  142/98  00:00     Inject Regadenoson        72  00:30     Post Injection            70  01:00     Post Injection            93  02:00     Post Injection           102  157/87  03:00     Post Injection            97  146/89  04:00     Post Injection            98  05:00     Recovery                  95  149/84  06:00     Recovery                  93  07:06     Recovery                  89  146/80  ------------------------------------------------------------------------  Agent: Regadenoson 0.4 mg/5 ml NS. injected over 10 sec.  HR: Baseline HR: 78 bpm   Peak HR: 102 bpm (68% of MPHR)  MPHR: 151 bpm   85% of MPHR: 128 bpm  BP: Baseline BP: 142/98 mmHg   Peak BP: 157/87 mmHg   Peak  RPP: [id number] (Rate Pressure Product)  Last Caffeine intake: 12 hrs  Terminated: Completion of protocol  ------------------------------------------------------------------------  SYMPTOMS/FINDINGS:  Symptoms: Dyspnea  Chest pain: No chest pain with administration of  Regadenoson  Treatment: None  ------------------------------------------------------------------------  ECG ABNORMALITIES DURING/AFTER STRESS:   Abnormalities: ECG changes could not be interpreted due  to bundle branch block.  ------------------------------------------------------------------------  NEW ARRHYTHMIAS DEVELOPED DURING/AFTER STRESS:  None  ------------------------------------------------------------------------  STRESS TEST IMPRESSIONS:  Chest Pain: No chest pain with administration of  Regadenoson.  Symptom: Dyspnea.  HR Response: Appropriate.  BP Response: Appropriate.  Heart Rhythm: Normal Sinus Rhythm - 71 BPM.  Conduction defects: LBBB.  Baseline ECG: No significant ST abnormalities.  ECG Abnormalities: ECG changes could not be interpreted  due to bundle branch block.  Arrhythmia: None.  ------------------------------------------------------------------------  PROCEDURE:  7.68 mCi of Tc99m Sestamibi were injected during stress  protocol. Approximately 45 minutes later, tomographic  images were obtained in a 180 degree arc from right  anterior oblique to left anterior oblique with 64 stops.  At a separate time on , 23.9 mCi of Tc99m  Sestamibi were injected at rest. Approximately 45  minute(s) later, tomographic images were obtained evita 180  degree arc from right anterior oblique to left anterior  oblique with 64 stops. The tomographic slices were  reconstructed in 3 orthogonal planes (short axis,  horizontal long axis and vertical long axis).  Interpretation was performed both byvisual and  quantitative analysis.  Rest and stress images were acquired using CZT-based  system with pinhole collimation ([employer], Lagiar), and reconstructed using MLEM algorithm.  Images were re-acquired with the patient in a prone  position.  ------------------------------------------------------------------------  NUCLEAR FINDINGS:  There is a small, mild to moderate defect in septal wall  that is fixed, suggestive of infarct.  ------------------------------------------------------------------------  GATED ANALYSIS:  Post-stress gated wall motion analysis was performed (LVEF  = 67 %;LVEDV = 83 ml.)  ------------------------------------------------------------------------  IMPRESSIONS:Abnormal Study  * There is a small, mild to moderate defect in septal wall  that is fixed, suggestive of infarct.  * Post-stress gated wall motion analysis was performed  (LVEF = 67 %;LVEDV = 83 ml.)  * Consistent with no clear evidence of ischemia.  ------------------------------------------------------------------------  Confirmed on  2019 - 13:36:00 by Broderick Ramsey M.D.  ------------------------------------------------------------------------    < end of copied text >  < from: Upper EUS (23 @ 07:27) >    [facility]  _______________________________________________________________________________  Patient Name: Cinthya Macdonald          Procedure Date: 2023 7:27 AM  MRN: [id number]                     Account Number: [id number]  YOB: 1950              Admit Type: Inpatient  Room: [place] [address]                         Gender: Female  Attending MD: DELILAH AMOR MD      _______________________________________________________________________________     Procedure:           Upper EUS  Indications:         Abnormal abdominal/pelvic CT scan  Providers:           DELILAH AMOR MD  Medicines:           Monitored Anesthesia Care, Cipro 400 mg IV  Complications:       No immediate complications.  Procedure:      Pre-Anesthesia Assessment:                       - Prior to the procedure, a History and Physical was                        performed, and patient medications and allergies were                        reviewed. The patient is competent. The risks and                        benefits of the procedure and the sedation options and                        risks were discussed with the patient. All questions                        were answered and informed consent was obtained. Patient                identification and proposed procedure were verified by                        the physician, the nurse, the anesthesiologist and the                        anesthetist in the pre-procedure area in the procedure                        room. Mental Status Examination: alert and oriented.                        Airway Examination: normal oropharyngeal airway and neck                        mobility. Respiratory Examination: clear to                        auscultation. CV Examination: normal. Prophylactic                        Antibiotics: The patient does not require prophylactic                        antibiotics. Prior Anticoagulants: The patient has taken                        no previous anticoagulant or antiplatelet agents. ASA                        Grade Assessment: III - A patient with severe systemic                        disease. After reviewing the risks and benefits, the                        patient was deemed in satisfactory condition to undergo           the procedure. The anesthesia plan was to use monitored                        anesthesia care (MAC). Immediately prior to                        administration of medications, the patient was                        re-assessed for adequacy to receive sedatives. The heart                        rate, respiratory rate, oxygen saturations, blood                        pressure, adequacy of pulmonary ventilation, and                        response to care were monitored throughout the                    procedure. The physical status of the patient was                        re-assessed after the procedure.                       After obtaining informed consent, the endoscope was                        passed under direct vision. Throughout the procedure,                        the patient's blood pressure, pulse, and oxygen                        saturations were monitored continuously. The Endoscope                        was introduced through the mouth, and advanced to the                        second part of duodenum. After obtaining informed                        consent, the endoscope was passed under direct vision.                        Throughout the procedure, the patient's blood pressure,   pulse, and oxygen saturations were monitored                        continuously.The upper GI endoscopy was accomplished                        without difficulty. The patient tolerated the procedure                        well.                                                              Findings:       ENDOSCOPIC FINDING: :       The examined esophagus was normal.       Patchy inflammation characterized by erythema was found in the gastric        antrum. Biopsies were takenwith a cold forceps for Helicobacter pylori        testing.       The examined duodenum was normal.       ENDOSONOGRAPHIC FINDING: :       An anechoic lesion suggestive of a cyst was identified adjacent to the        spleen. The lesion measured 36 mmby 32 mm in maximal cross-sectional        diameter. There were 2 compartments thinly septated. There was no        associated mass. Diagnostic needle aspiration for fluid was performed.        Color Doppler imaging was utilized prior to needle puncture to confirm a        lack of significant vascular structures within the needle path. One pass        was made with the 22 gauge needle using a transgastric approach. Given        viscosity of the fluid, no specimen was obtained. One pass was made with        the 19 gauge needle using a transgastric approach. No stylet was used.        The amount of fluid collected was 20 mL. The cavity appeared collapsed        with fluid aspiration. The fluid was opaque, brown and viscous.        Sample(s) were sent for amylase concentration, cytology and CEA.       No lymphadenopathy seen.                                                                                   Impression:          - Normal esophagus.                       - Gastritis. Biopsied.                       - Normal examined duodenum.                       - A cystic lesion measuring 36 mm by 32 mm was                        visualized endosonographically adjacent to the spleen.                        Fine needle aspiration for fluid performed.  Recommendation:      - [facility] patient [facility] for [facility] care.                       - Await cytology results and await path results.                       - Resume previous diet                       - Continue at least 2-3 days of antibiotics                        (Levofloxacin or Cipro).                                                                                   Attending Participation:       I personally performed the entire procedure.                                                          ___________________  DELILAH AMOR MD  2023 9:20:26 AM  Number of Addenda: 0    Note Initiated On: 2023 7:27 AM    < end of copied text >  	  	     Education:  [x] Verbalizes understanding. [] Demonstrates understanding. [x] Needs review at next sesion  [] Demonstrates/verbalizes HEP/Ed previously given. PLAN:   [x]  Continue OT plan of care 1 x per week for 30 minute treatment sessions: Treatment delivered based on POC and graduated to patient's progress. Patient/Caregiver education continues at each visit to obtain maximum benefit from skilled OT intervention. Parent/Caregiver provided with recommendations for home to promote goals.    []  Alter Plan of care:   []  Discharge:      Treatment Time In:0800            Treatment Time Out: 0830     Total Treatment Time: 30 minutes           Treatment Charges: Mins Units   Ther Ex  [id number]     Manual Therapy [id number] [facility]     Thera Activities [id number] 60 2   ADL/Home Mgt [id number]     Neuro Re-ed [id number]     Group Therapy      Orthotic manage/training  [id number]     Non-Billable Time     Total Timed Treatment [address] [place], [place]  [id number] Cardiology/Vascular Medicine Inpatient Consultation Note    HISTORY OF PRESENT ILLNESS:    Patient known to me from the office.    She is a 74 yo woman with HTN, HLD, DM Type 2, AVB s/p PPM, paroxysmal atrial tachycardia, H. Pylori on triple Abx therapy presents with recent progressive symptoms of exertional dyspnea over past 2-3 weeks.     Recent CT scan also noted possible gastric mass.  Patient notes having black stools and intermittent constipation.   Reports significant generalized fatigue and lightheadedness.    CT Abdomen and Pelvis with contrast from 23L  4.1cm x 3.2cm structure posterior to the stomach with crescentic enhancing component. 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   - S/p EUS with biopsy on . During endoscopy, an FNA of a cystic lesion adjacent to the spleen was performed    When evaluated this morning following the EUS procedure, the patient reported being asymptomatic from the cardiac perspective.  She was clinically and hemodynamically stable. and appeared euvolemic on exam.    Recent echocardiogram performed in 2023 noted normal biventricular systolic function with mild diastolic dysfunction.    No further inpatient cardiac testing needed at this time.      Allergies  No Known Allergies    MEDICATIONS:  aspirin enteric coated 81 milliGRAM(s) Oral daily  metoprolol succinate ER 50 milliGRAM(s) Oral daily  valsartan 320 milliGRAM(s) Oral daily  amoxicillin 750 milliGRAM(s) Oral three times a day  levoFLOXacin  Tablet 500 milliGRAM(s) Oral every 24 hours  acetaminophen     Tablet .. 650 milliGRAM(s) Oral every 6 hours PRN  melatonin 3 milliGRAM(s) Oral at bedtime PRN  ondansetron Injectable 4 milliGRAM(s) IV Push every 8 hours PRN  pantoprazole    Tablet 40 milliGRAM(s) Oral two times a day  atorvastatin 20 milliGRAM(s) Oral at bedtime  dextrose 50% Injectable 25 Gram(s) IV Push once  dextrose 50% Injectable 12.5 Gram(s) IV Push once  dextrose 50% Injectable 25 Gram(s) IV Push once  dextrose Oral Gel 15 Gram(s) Oral once PRN  glucagon  Injectable 1 milliGRAM(s) IntraMuscular once  insulin glargine Injectable (LANTUS) 3 Unit(s) SubCutaneous at bedtime  insulin lispro (ADMELOG) corrective regimen sliding scale   SubCutaneous Before meals and at bedtime  insulin lispro Injectable (ADMELOG) 1 Unit(s) SubCutaneous three times a day before meals  dextrose 5%. 1000 milliLiter(s) IV Continuous <Continuous>  dextrose 5%. 1000 milliLiter(s) IV Continuous <Continuous>  sodium chloride 1 Gram(s) Oral every 8 hours      PAST MEDICAL & SURGICAL HISTORY:  HTN (Hypertension)  Backpain  S/P Cataract Surgery  Retina s/p macular hole repair    FAMILY HISTORY:  No pertinent family history in first degree relatives    SOCIAL HISTORY:    As above, as per chart notes    REVIEW OF SYSTEMS:  As above, as per chart notes    PHYSICAL EXAM:  T(C): 36.8 (23 @ 07:50), Max: 37 (23 @ 13:10)  HR: 76 (23 @ 07:50) (76 - 94)  BP: 127/67 (23 @ 07:50) (126/63 - 132/70)  RR: 17 (23 @ 07:50) (17 - 18)  SpO2: 95% (23 @ 07:50) (95% - 98%)    Appearance: NAD  HEENT:   No apparent JVD  Cardiovascular: Normal S1 S2, No JVD, No murmurs, No edema  Respiratory: Lungs clear to auscultation	  Psychiatry: Awake, alert  Gastrointestinal:  Soft, Non-tender, + BS	  Neurologic: Non-focal  Extremities: No LE edema      LABS:	 	    CBC Full  -  ( 2023 03:00 )  WBC Count : 9.15 K/uL  Hemoglobin : 11.1 g/dL  Hematocrit : 34.6 %  Platelet Count - Automated : 188 K/uL  Mean Cell Volume : 83.8 fL  Mean Cell Hemoglobin : 26.9 pg  Mean Cell Hemoglobin Concentration : 32.1 gm/dL  Auto Neutrophil # : x  Auto Lymphocyte # : x  Auto Monocyte # : x  Auto Eosinophil # : x  Auto Basophil # : x  Auto Neutrophil % : x  Auto Lymphocyte % : x  Auto Monocyte % : x  Auto Eosinophil % : x  Auto Basophil % : x        128<L>  |  94<L>  |  9   ----------------------------<  163<H>  4.0   |  21<L>  |  0.73      128<L>  |  94<L>  |  10  ----------------------------<  138<H>  3.8   |  24  |  0.77    Ca    8.9      2023 03:00  Ca    8.9      2023 15:55  Phos  3.1     06-09  Phos  3.4     06-08  Mg     1.80     06-09  Mg     1.80     06-08    TPro  6.8  /  Alb  x   /  TBili  x   /  DBili  x   /  AST  x   /  ALT  x   /  AlkPhos  x   06-08  TPro  7.4  /  Alb  3.0<L>  /  TBili  0.6  /  DBili  x   /  AST  49<H>  /  ALT  27  /  AlkPhos  80  06-07    < from: Transthoracic Echocardiogram (23 @ 09:11) >    Patient name: CINTHYA MACDONALD  YOB: 1950   Age: 72 (F)   MR#: [id number]  Study Date: 3/29/2023  Location: O/PSonographer: Devika Paulino [facility]UL[facility]  Study quality: Technically good  Referring Physician: Kev Bean MD, PhD / Mariza Key MD  Blood Pressure: 128/81 mmHg  Height: 163 cm  Weight: 68 kg  BSA: 1.7 m2  ------------------------------------------------------------------------  PROCEDURE: Transthoracic echocardiogram with 2-D, M-Mode  and complete spectral and color flow Doppler.  INDICATION: Unspecified atrioventricular block (I44.30)  ------------------------------------------------------------------------  DIMENSIONS:  Dimensions:     Normal Values:  LA:     3.6 cm    2.0 - 4.0 cm  Ao:     2.8 cm    2.0 - 3.8 cm  SEPTUM: 0.8 cm    0.6 - 1.2 cm  PWT:    1.0 cm    0.6 - 1.1 cm  LVIDd:  4.0 cm    3.0 - 5.6 cm  LVIDs:  2.6 cm    1.8 - 4.0 cm  Derived Variables:  LVMI: 63 g/m2  RWT: 0.50  Fractional short: 35 %  Ejection Fraction (Modified Martino Rule): 62 %  ------------------------------------------------------------------------  OBSERVATIONS:  Mitral Valve: Mitral annular calcification, otherwise  normal mitral valve. Mild mitral regurgitation.  Aortic Root: Normal aortic root.  Aortic Valve: Calcified trileaflet aortic valve with normal  opening. No aortic valve regurgitation seen.  Left Atrium: Normal left atrium.  LA volume index = 28  cc/m2.  Left Ventricle: Normal left ventricular systolic function.  No segmental wall motion abnormalities. Normal left  ventricular internal dimensions and wall thicknesses. Mild  diastolic dysfunction (Stage I).  Right Heart: Normal right atrium. The right ventricle is  not well visualized; grossly normal right ventricular  systolic function.  A device wire is noted in the right  heart. Normal tricuspid valve. Mild tricuspid  regurgitation. Pulmonic valve not well visualized. Mild  pulmonic regurgitation.  Pericardium/PleuraNormal pericardium with no pericardial  effusion.  Hemodynamic: Estimated right ventricular systolic pressure  equals 36 mm Hg, assuming right atrial pressure equals 10  mm Hg, consistent with borderline pulmonary hypertension.  ------------------------------------------------------------------------  CONCLUSIONS:  1. Mitral annular calcification, otherwise normal mitral  valve. Mild mitral regurgitation.  2. Normal left ventricular internal dimensions and wall  thicknesses.  3. Normal left ventricular systolic function. No segmental  wall motion abnormalities.  4. Mild diastolic dysfunction (Stage I).  5. The right ventricle is not well visualized; grossly  normal right ventricular systolic function.  A device wire  is noted in the right heart.  6. Normal tricuspid valve. Mild tricuspid regurgitation.  7. Estimated pulmonary artery systolic pressure equals 36  mm Hg, assuming right atrial pressure equals 10  mm Hg,  consistent with borderline pulmonary hypertension.  ------------------------------------------------------------------------  Confirmed on  3/29/2023 - 16:39:11by Kev Bean MD, [facility],  [facility]  ------------------------------------------------------------------------    < end of copied text >  < from: Nuclear Stress Test-Pharmacologic (19 @ 11:21) >    PATIENT: CINTHYA MACDONALD  : 1950   AGE: 69 (F)   MR#: [id number]  STUDY DATE: 2019  LOCATION: O/P  REF. PHYSICIAN(S): Jonah Gutierrez MD  FELLOW: GIAN Sage PA  ------------------------------------------------------------------------  TYPE OF TEST: Stress/Rest  Pharmacologic  INDICATION: Abnormal electrocardiogram (ECG) (EKG)  (R94.31)  ------------------------------------------------------------------------  HISTORY:  CARDIAC HISTORY: 69 year old female sent for cardiac  evaluation, past medical history hypertension, s/p PPM  RISK FACTORS: Hypertension, Post Menopausal  MEDICATIONS: diovan, metoprolol  ------------------------------------------------------------------------  BASELINE ELECTROCARDIOGRAM:  Rhythm: Normal Sinus Rhythm - 71 BPM  Conduction Defect: LBBB  ST: No significant ST abnormalities.  ------------------------------------------------------------------------  HEMODYNAMIC PARAMETERS:                                      HR      BP  Baseline  Pre-Injection             78  142/98  00:00     Inject Regadenoson        72  00:30     Post Injection            70  01:00     Post Injection            93  02:00     Post Injection           102  157/87  03:00     Post Injection            97  146/89  04:00     Post Injection            98  05:00     Recovery                  95  149/84  06:00     Recovery                  93  07:06     Recovery                  89  146/80  ------------------------------------------------------------------------  Agent: Regadenoson 0.4 mg/5 ml NS. injected over 10 sec.  HR: Baseline HR: 78 bpm   Peak HR: 102 bpm (68% of MPHR)  MPHR: 151 bpm   85% of MPHR: 128 bpm  BP: Baseline BP: 142/98 mmHg   Peak BP: 157/87 mmHg   Peak  RPP: [id number] (Rate Pressure Product)  Last Caffeine intake: 12 hrs  Terminated: Completion of protocol  ------------------------------------------------------------------------  SYMPTOMS/FINDINGS:  Symptoms: Dyspnea  Chest pain: No chest pain with administration of  Regadenoson  Treatment: None  ------------------------------------------------------------------------  ECG ABNORMALITIES DURING/AFTER STRESS:   Abnormalities: ECG changes could not be interpreted due  to bundle branch block.  ------------------------------------------------------------------------  NEW ARRHYTHMIAS DEVELOPED DURING/AFTER STRESS:  None  ------------------------------------------------------------------------  STRESS TEST IMPRESSIONS:  Chest Pain: No chest pain with administration of  Regadenoson.  Symptom: Dyspnea.  HR Response: Appropriate.  BP Response: Appropriate.  Heart Rhythm: Normal Sinus Rhythm - 71 BPM.  Conduction defects: LBBB.  Baseline ECG: No significant ST abnormalities.  ECG Abnormalities: ECG changes could not be interpreted  due to bundle branch block.  Arrhythmia: None.  ------------------------------------------------------------------------  PROCEDURE:  7.68 mCi of Tc99m Sestamibi were injected during stress  protocol. Approximately 45 minutes later, tomographic  images were obtained in a 180 degree arc from right  anterior oblique to left anterior oblique with 64 stops.  At a separate time on , 23.9 mCi of Tc99m  Sestamibi were injected at rest. Approximately 45  minute(s) later, tomographic images were obtained evita 180  degree arc from right anterior oblique to left anterior  oblique with 64 stops. The tomographic slices were  reconstructed in 3 orthogonal planes (short axis,  horizontal long axis and vertical long axis).  Interpretation was performed both byvisual and  quantitative analysis.  Rest and stress images were acquired using CZT-based  system with pinhole collimation ([employer], [employer]), and reconstructed using MLEM algorithm.  Images were re-acquired with the patient in a prone  position.  ------------------------------------------------------------------------  NUCLEAR FINDINGS:  There is a small, mild to moderate defect in septal wall  that is fixed, suggestive of infarct.  ------------------------------------------------------------------------  GATED ANALYSIS:  Post-stress gated wall motion analysis was performed (LVEF  = 67 %;LVEDV = 83 ml.)  ------------------------------------------------------------------------  IMPRESSIONS:Abnormal Study  * There is a small, mild to moderate defect in septal wall  that is fixed, suggestive of infarct.  * Post-stress gated wall motion analysis was performed  (LVEF = 67 %;LVEDV = 83 ml.)  * Consistent with no clear evidence of ischemia.  ------------------------------------------------------------------------  Confirmed on  2019 - 13:36:00 by Broderick Ramsey M.D.  ------------------------------------------------------------------------    < end of copied text >  < from: Upper EUS (23 @ 07:27) >    [facility]  _______________________________________________________________________________  Patient Name: Cinthya Macdonald          Procedure Date: 2023 7:27 AM  MRN: [id number]                     Account Number: [id number]  YOB: 1950              Admit Type: Inpatient  Room: [place] [address]                         Gender: Female  Attending MD: DELILAH AMOR MD      _______________________________________________________________________________     Procedure:           Upper EUS  Indications:         Abnormal abdominal/pelvic CT scan  Providers:           DELILAH AMOR MD  Medicines:           Monitored Anesthesia Care, Cipro 400 mg IV  Complications:       No immediate complications.  Procedure:      Pre-Anesthesia Assessment:                       - Prior to the procedure, a History and Physical was                        performed, and patient medications and allergies were                        reviewed. The patient is competent. The risks and                        benefits of the procedure and the sedation options and                        risks were discussed with the patient. All questions                        were answered and informed consent was obtained. Patient                identification and proposed procedure were verified by                        the physician, the nurse, the anesthesiologist and the                        anesthetist in the pre-procedure area in the procedure                        room. Mental Status Examination: alert and oriented.                        Airway Examination: normal oropharyngeal airway and neck                        mobility. Respiratory Examination: clear to                        auscultation. CV Examination: normal. Prophylactic                        Antibiotics: The patient does not require prophylactic                        antibiotics. Prior Anticoagulants: The patient has taken                        no previous anticoagulant or antiplatelet agents. ASA                        Grade Assessment: III - A patient with severe systemic                        disease. After reviewing the risks and benefits, the                        patient was deemed in satisfactory condition to undergo           the procedure. The anesthesia plan was to use monitored                        anesthesia care (MAC). Immediately prior to                        administration of medications, the patient was                        re-assessed for adequacy to receive sedatives. The heart                        rate, respiratory rate, oxygen saturations, blood                        pressure, adequacy of pulmonary ventilation, and                        response to care were monitored throughout the                    procedure. The physical status of the patient was                        re-assessed after the procedure.                       After obtaining informed consent, the endoscope was                        passed under direct vision. Throughout the procedure,                        the patient's blood pressure, pulse, and oxygen                        saturations were monitored continuously. The Endoscope                        was introduced through the mouth, and advanced to the                        second part of duodenum. After obtaining informed                        consent, the endoscope was passed under direct vision.                        Throughout the procedure, the patient's blood pressure,   pulse, and oxygen saturations were monitored                        continuously.The upper GI endoscopy was accomplished                        without difficulty. The patient tolerated the procedure                        well.                                                              Findings:       ENDOSCOPIC FINDING: :       The examined esophagus was normal.       Patchy inflammation characterized by erythema was found in the gastric        antrum. Biopsies were takenwith a cold forceps for Helicobacter pylori        testing.       The examined duodenum was normal.       ENDOSONOGRAPHIC FINDING: :       An anechoic lesion suggestive of a cyst was identified adjacent to the        spleen. The lesion measured 36 mmby 32 mm in maximal cross-sectional        diameter. There were 2 compartments thinly septated. There was no        associated mass. Diagnostic needle aspiration for fluid was performed.        Color Doppler imaging was utilized prior to needle puncture to confirm a        lack of significant vascular structures within the needle path. One pass        was made with the 22 gauge needle using a transgastric approach. Given        viscosity of the fluid, no specimen was obtained. One pass was made with        the 19 gauge needle using a transgastric approach. No stylet was used.        The amount of fluid collected was 20 mL. The cavity appeared collapsed        with fluid aspiration. The fluid was opaque, brown and viscous.        Sample(s) were sent for amylase concentration, cytology and CEA.       No lymphadenopathy seen.                                                                                   Impression:          - Normal esophagus.                       - Gastritis. Biopsied.                       - Normal examined duodenum.                       - A cystic lesion measuring 36 mm by 32 mm was                        visualized endosonographically adjacent to the spleen.                        Fine needle aspiration for fluid performed.  Recommendation:      - [facility] patient to [facility] for [facility].                       - Await cytology results and await path results.                       - Resume previous diet                       - Continue at least 2-3 days of antibiotics                        (Levofloxacin or Cipro).                                                                                   Attending Participation:       I personally performed the entire procedure.                                                          ___________________  DELILAH AMOR MD  2023 9:20:26 AM  Number of Addenda: 0    Note Initiated On: 2023 7:27 AM    < end of copied text >

## 2023-06-09 NOTE — DIETITIAN INITIAL EVALUATION ADULT - PERTINENT MEDS FT
MEDICATIONS  (STANDING):  amoxicillin 750 milliGRAM(s) Oral three times a day  aspirin enteric coated 81 milliGRAM(s) Oral daily  atorvastatin 20 milliGRAM(s) Oral at bedtime  dextrose 5%. 1000 milliLiter(s) (100 mL/Hr) IV Continuous <Continuous>  dextrose 5%. 1000 milliLiter(s) (50 mL/Hr) IV Continuous <Continuous>  dextrose 50% Injectable 25 Gram(s) IV Push once  dextrose 50% Injectable 12.5 Gram(s) IV Push once  dextrose 50% Injectable 25 Gram(s) IV Push once  glucagon  Injectable 1 milliGRAM(s) IntraMuscular once  insulin glargine Injectable (LANTUS) 3 Unit(s) SubCutaneous at bedtime  insulin lispro (ADMELOG) corrective regimen sliding scale   SubCutaneous Before meals and at bedtime  insulin lispro Injectable (ADMELOG) 1 Unit(s) SubCutaneous three times a day before meals  levoFLOXacin  Tablet 500 milliGRAM(s) Oral every 24 hours  metoprolol succinate ER 50 milliGRAM(s) Oral daily  pantoprazole    Tablet 40 milliGRAM(s) Oral two times a day  senna 2 Tablet(s) Oral once  sodium chloride 1 Gram(s) Oral every 8 hours  valsartan 320 milliGRAM(s) Oral daily    MEDICATIONS  (PRN):  acetaminophen     Tablet .. 650 milliGRAM(s) Oral every 6 hours PRN Temp greater or equal to 38C (100.4F), Mild Pain (1 - 3)  dextrose Oral Gel 15 Gram(s) Oral once PRN Blood Glucose LESS THAN 70 milliGRAM(s)/deciliter  melatonin 3 milliGRAM(s) Oral at bedtime PRN Insomnia  ondansetron Injectable 4 milliGRAM(s) IV Push every 8 hours PRN Nausea and/or Vomiting

## 2023-06-09 NOTE — DIETITIAN INITIAL EVALUATION ADULT - ADD RECOMMEND
Swallow evaluation to determine if diet consistency upgrade is appropriate to promote PO. Nutrition department will provide Orgain BID (440 kcal, 32 g pro). Document PO intake to monitor trend.

## 2023-06-09 NOTE — PROGRESS NOTE ADULT - PROBLEM SELECTOR PLAN 2
Outpt CT A/P reportedly significant for gastric mass;   CT Abdomen and Pelvis with contrast from 6/5 showed 4.1cm x 3.2cm structure posterior to the stomach with crescentic enhancing component. 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 Gurwinder Rosado, does not see patients at [facility], consulted in house GI for possible EUS with biopsy Unclear etiology; Not wheezing on exam; Possible HFpEF, mild;   CXR: clear lungs, no PTX or effusions  CTA chest: no PE  Recent TTE, 29/3/23: Mild diastolic dysfunction (Stage I) and EF=62%  Elevated Pro-BNP, 486 (maybe normal for age), Trop 13 x2  EKG: LBBB, not acute   c/w BB, ASA, Statin   Interrogation of pacemaker by EP on 6/8, showed V paced 99% of the time with normal sensing and pacing with excellent threshold capture.       a1c was 6.1  TSH wnl Unclear etiology; Not wheezing on exam; Possible HFpEF, mild;   CXR: clear lungs, no PTX or effusions  CTA chest: no PE  Recent TTE, 29/3/23: Mild diastolic dysfunction (Stage I) and EF=62%  Elevated Pro-BNP, 486 (maybe normal for age), Trop 13 x2  EKG: LBBB, not acute   c/w BB, ASA, Statin   Interrogation of pacemaker by EP on 6/8, showed V paced 99% of the time with normal sensing and pacing with excellent threshold capture.   a1c was 6.1  TSH wnl  cardiology consulted for pre-op evaluation, will f/u  Shortness of breath has Resolved

## 2023-06-09 NOTE — DISCHARGE NOTE PROVIDER - NSDCMRMEDTOKEN_GEN_ALL_CORE_FT
amoxicillin 500 mg oral tablet: 2 orally 2 times a day  aspirin 81 mg oral delayed release tablet: 1 tab(s) orally once a day  atorvastatin 20 mg oral tablet: 1 tab(s) orally once a day (at bedtime)  JANUVIA 100 MG TABLET: TAKE 1 TABLET BY MOUTH EVERY DAY  levoFLOXacin 500 mg oral tablet: 1 orally once a day  metoprolol succinate 50 mg oral tablet, extended release: 1 tab(s) orally once a day  omeprazole 40 mg oral delayed release capsule: 1 orally 2 times a day  Physical Therapy: Please work with physical therapy 2 times a week for a minimum of 6 weeks in order to improve your strength and stability. ICD Code R53.1  valsartan 320 mg oral tablet: 1 tab(s) orally once a day   aspirin 81 mg oral delayed release tablet: 1 tab(s) orally once a day  atorvastatin 20 mg oral tablet: 1 tab(s) orally once a day (at bedtime)  JANUVIA 100 MG TABLET: TAKE 1 TABLET BY MOUTH EVERY DAY  Lasix 20 mg oral tablet: 1 tab(s) orally once a day  metoprolol succinate 50 mg oral tablet, extended release: 1 tab(s) orally once a day  Physical Therapy: Please work with physical therapy 2 times a week for a minimum of 6 weeks in order to improve your strength and stability. ICD Code R53.1  Sodium Chloride 1 g oral tablet: 2 tab(s) orally 3 times a day (with meals)  valsartan 160 mg oral tablet: 1 tab(s) orally once a day   aluminum hydroxide-magnesium hydroxide 200 mg-200 mg/5 mL oral suspension: 20 milliliter(s) orally every 4 hours as needed for  heartburn  aspirin 81 mg oral delayed release tablet: 1 tab(s) orally once a day  atorvastatin 20 mg oral tablet: 1 tab(s) orally once a day (at bedtime)  JANUVIA 100 MG TABLET: TAKE 1 TABLET BY MOUTH EVERY DAY  Lasix 20 mg oral tablet: 1 tab(s) orally once a day  metoprolol succinate 50 mg oral tablet, extended release: 1 tab(s) orally once a day  Physical Therapy: Please work with physical therapy 2 times a week for a minimum of 6 weeks in order to improve your strength and stability. ICD Code R53.1  senna leaf extract oral tablet: 2 tab(s) orally once  Sodium Chloride 1 g oral tablet: 2 tab(s) orally 3 times a day (with meals)  valsartan 160 mg oral tablet: 1 tab(s) orally once a day

## 2023-06-09 NOTE — PACU DISCHARGE NOTE - COMMENTS
T(C): 36.3 (06-09-23 @ 09:15), Max: 37 (06-08-23 @ 13:10)  HR: 70 (06-09-23 @ 09:45) (70 - 94)  BP: 107/89 (06-09-23 @ 09:45) (107/89 - 132/70)  RR: 23 (06-09-23 @ 09:45) (17 - 23)  SpO2: 95% (06-09-23 @ 09:45) (95% - 98%)    No apparent anesthesia complications. Vital signs stable, non-labored breathing with adequate oxygen saturation. Pain and nausea are controlled. All questions answered. Stable for PACU discharge and transfer to the floor.

## 2023-06-09 NOTE — DISCHARGE NOTE PROVIDER - HOSPITAL COURSE
74yo Female with MHx of HTN, HLD, DM Type 2, AVB s/p PPM , H. Pylori on triple Abx therapy, c/o SOB, progressively worse w/ exertion over past 2-3 weeks. Pt also recently underwent CT scan revealing "mass" in the stomach c/w possible neoplasm. pt has been having black stools/intermittent constipation, and now significant fatigue and lightheadedness. Reports no calf pain, leg swelling, hx of DVT/PE, recent travel, smoking, or surgeries. Pt also reports no chest pain, numbness, tingling, vision changes, headaches, fevers, cough, sore throat, n/v/d.  Pt does not have imaging reports with her, and cannot recall her home medications. Pt unable to state whether she completed Abx therapy for H pylori or still taking Abx. States that her daughter will come to the hospital in the morning. In the ED, patient had CTA of her chest, which showed no pulmonary embolism, but showed 3.7 x 3.2 cm indeterminant left upper retroperitoneal mass.      Once admitted, patient's daughter faxed CT abdomen and pelvis report from 6/5, which showed a 4.1cm x 3.2cm structure posterior to the stomach with crescentic enhancing component. GI was consulted for EUS with biopsy. During endoscopy, an FNA of a cystic lesion adjacent to the spleen was performed and cytoploy and path were sent. Patient tolerated procedure well and resumed her previous diet. During hospital stay EP interrogated her pacemaker which showed V paced 99% of the time with normal sensing and pacing with excellent threshold capture. PT also evaluated patient and recommended outpatient PT.     At this time, patient is hemodynamically stable and no longer in need of inpatient care      74yo Female with MHx of HTN, HLD, DM Type 2, AVB s/p PPM , H. Pylori on triple Abx therapy, c/o SOB, progressively worse w/ exertion over past 2-3 weeks. Pt also recently underwent CT scan revealing "mass" in the stomach c/w possible neoplasm. pt has been having black stools/intermittent constipation, and now significant fatigue and lightheadedness. Reports no calf pain, leg swelling, hx of DVT/PE, recent travel, smoking, or surgeries. Pt also reports no chest pain, numbness, tingling, vision changes, headaches, fevers, cough, sore throat, n/v/d.  Pt does not have imaging reports with her, and cannot recall her home medications. Pt unable to state whether she completed Abx therapy for H pylori or still taking Abx. States that her daughter will come to the hospital in the morning. In the ED, patient had CTA of her chest, which showed no pulmonary embolism, but showed 3.7 x 3.2 cm indeterminant left upper retroperitoneal mass.      Once admitted, patient's daughter faxed CT abdomen and pelvis report from 6/5, which showed a 4.1cm x 3.2cm structure posterior to the stomach with crescentic enhancing component. GI was consulted for EUS with biopsy. During endoscopy, an FNA of a cystic lesion adjacent to the spleen was performed and cytoploy and path were sent. Patient tolerated procedure well and resumed her previous diet. During hospital stay EP interrogated her pacemaker which showed V paced 99% of the time with normal sensing and pacing with excellent threshold capture. Hospital course further complicated by hyponatremia to 125. Initially thought to be pseudohyponatremia as serum osm was 276. However, given current presentation and concern for malignancy, hyponatremia may have been due to SIADH. As a result, patient was fluid restricted and given 1g salt tabs TID. Despite salts tabs and fluid restrictions, patient's hyponatremia minimally improved. Renal was consulted and recommended lasix 20mg daily and 1.5% hypertonic saline for 12 hours, which did not improved the hyponatremia either. Patient was then subsequently put on 3% hypertonic solution, which improved her sodium to ____ PT also evaluated patient and recommended outpatient PT.  At this time, patient is hemodynamically stable and no longer in need of inpatient care      72yo Female with MHx of HTN, HLD, DM Type 2, AVB s/p PPM , H. Pylori on triple Abx therapy, c/o SOB, progressively worse w/ exertion over past 2-3 weeks. Pt also recently underwent CT scan revealing "mass" in the stomach c/w possible neoplasm. pt has been having black stools/intermittent constipation, and now significant fatigue and lightheadedness. Reports no calf pain, leg swelling, hx of DVT/PE, recent travel, smoking, or surgeries. Pt also reports no chest pain, numbness, tingling, vision changes, headaches, fevers, cough, sore throat, n/v/d.  Pt does not have imaging reports with her, and cannot recall her home medications. Pt unable to state whether she completed Abx therapy for H pylori or still taking Abx. States that her daughter will come to the hospital in the morning. In the ED, patient had CTA of her chest, which showed no pulmonary embolism, but showed 3.7 x 3.2 cm indeterminant left upper retroperitoneal mass.      Once admitted, patient's daughter faxed CT abdomen and pelvis report from 6/5, which showed a 4.1cm x 3.2cm structure posterior to the stomach with crescentic enhancing component. GI was consulted for EUS with biopsy. During endoscopy, an FNA of a cystic lesion adjacent to the spleen was performed and cytoploy and path were sent. Patient tolerated procedure well and resumed her previous diet. During hospital stay EP interrogated her pacemaker which showed V paced 99% of the time with normal sensing and pacing with excellent threshold capture. Hospital course further complicated by hyponatremia to 125. Initially thought to be pseudohyponatremia as serum osm was 276. However, given current presentation and concern for malignancy, hyponatremia may have been due to SIADH. As a result, patient was fluid restricted and given 1g salt tabs TID. Despite salts tabs and fluid restrictions, patient's hyponatremia minimally improved. Renal was consulted and recommended lasix 20mg daily and 1.5% hypertonic saline for 12 hours, which did not improved the hyponatremia either. Patient was then subsequently put on 3% hypertonic solution, which improved her sodium to 130. On discharge, patient was instructed to continue lasix 20mg daily, salt tabs 2g TID, and restrict free water intake. PT also evaluated patient and recommended outpatient PT.    At this time, patient is hemodynamically stable and no longer in need of inpatient care      72yo Female with MHx of HTN, HLD, DM Type 2, AVB s/p PPM , H. Pylori on triple Abx therapy, c/o SOB, progressively worse w/ exertion over past 2-3 weeks. Pt also recently underwent CT scan revealing "mass" in the stomach c/w possible neoplasm. pt has been having black stools/intermittent constipation, and now significant fatigue and lightheadedness. Reports no calf pain, leg swelling, hx of DVT/PE, recent travel, smoking, or surgeries. Pt also reports no chest pain, numbness, tingling, vision changes, headaches, fevers, cough, sore throat, n/v/d.  Pt does not have imaging reports with her, and cannot recall her home medications. Pt unable to state whether she completed Abx therapy for H pylori or still taking Abx. States that her daughter will come to the hospital in the morning. In the ED, patient had CTA of her chest, which showed no pulmonary embolism, but showed 3.7 x 3.2 cm indeterminant left upper retroperitoneal mass.      Once admitted, patient's daughter faxed CT abdomen and pelvis report from 6/5, which showed a 4.1cm x 3.2cm structure posterior to the stomach with crescentic enhancing component. GI was consulted for EUS with biopsy. During endoscopy, an FNA of a cystic lesion adjacent to the spleen was performed and cytoploy and path were sent. Patient tolerated procedure well and resumed her previous diet. During hospital stay EP interrogated her pacemaker which showed V paced 99% of the time with normal sensing and pacing with excellent threshold capture. Hospital course further complicated by hyponatremia to 125. Initially thought to be pseudohyponatremia as serum osm was 276. However, given current presentation and concern for malignancy, hyponatremia may have been due to SIADH. As a result, patient was fluid restricted and given 1g salt tabs TID. Despite salts tabs and fluid restrictions, patient's hyponatremia minimally improved. Renal was consulted and recommended lasix 20mg daily and 1.5% hypertonic saline for 12 hours, which did not improved the hyponatremia either. Patient was then subsequently put on 3% hypertonic solution, which improved her sodium to 130 [when corrected for hyperglycemia]. On discharge, patient was instructed to continue lasix 20mg daily, salt tabs 2g TID, and restrict free water intake. PT also evaluated patient and recommended outpatient PT.    At this time, patient is hemodynamically stable and no longer in need of inpatient care     DC Dx:  #Hyponatremia 2' SIADH  #RP mass  #Gastritis  #HTN  #DM2  #HLD

## 2023-06-09 NOTE — CONSULT NOTE ADULT - ASSESSMENT
Patient is a 72 yo woman with HTN, HLD, DM Type 2, AVB s/p PPM, paroxysmal atrial tachycardia, H. Pylori on triple Abx therapy presents with recent progressive symptoms of exertional dyspnea over past 2-3 weeks.     Recent CT scan also noted possible gastric mass.  Patient notes having black stools and intermittent constipation.   Reports significant generalized fatigue and lightheadedness.    CT Abdomen and Pelvis with contrast from 6/5/23L  4.1cm x 3.2cm structure posterior to the stomach with crescentic enhancing component. Imaging from 2021 show similar mass but smaller  concerning for indolent neoplasm.   - S/p EUS with biopsy on 6/9. During endoscopy, an FNA of a cystic lesion adjacent to the spleen was performed    When evaluated this morning following the EUS procedure, the patient reported being asymptomatic from the cardiac perspective.  She was clinically and hemodynamically stable. and appeared euvolemic on exam.    Recent echocardiogram performed in March 2023 noted normal biventricular systolic function with mild diastolic dysfunction.    No further inpatient cardiac testing needed at this time.

## 2023-06-09 NOTE — PRE-OP CHECKLIST - HOW ADMINISTERED
78 year old woman with history of CHF, Afib on Xarelto, HTN BIBA with EMS for increasing SOB which started at 1130 pm last night. Patient was recently discharged from  for an admission for GI bleeding. Xarelto was held on dc. Patient had an outpt endoscopy 2 dyas ago and was told to restart back her Xarelto after the procedure. According to chart- patient went into respiratory arrest en route and she needed to have BVM- patient was connected to CPAP on admission has since been weaned off to N/C. Initial lactate was 9.8-->6.1. WBC 16.48, BNP 3185- initial troponin was 0.025-->0.207. Afib with RVR on admission- patient received Cardizem 10mg IV with good results- HR in the 90s . ICU consult for elevated lactate On exam at 1030 am- patient complaining of chest pain abdominal pain- pending CT abdomen and pelvis results. Patient still Afib with RVR- dig loaded. TTE preliminary read showed 20% EF- patient receiving #2 of 1Liter IVF bolus- patient could not be given another dose of dilitazem due to hypotension  SBP in the 80's. ICU again reconsulted. Patient was started on Cefepime and Vancomycin for possible HCAP.     PMH- Afib, HTN. 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   No pertinent family history in first degree relatives.   No Pertinent Family History in first degree relatives of: CAD, CVA, Cancer.  Social- denies smoking or drinking
See MAR for last dose taken

## 2023-06-09 NOTE — DISCHARGE NOTE PROVIDER - CARE PROVIDER_API CALL
Alonzo Purdy  Gastroenterology  [address] [facility] [place] [place], [address]  [place] [address]  Phone: [phone]  Fax: [phone]  Established Patient  Follow Up Time: 1 week   Alonzo Purdy  Gastroenterology  [address] [facility], [address]  [place] [address]  Phone: [phone]  Fax: [phone]  Established Patient  Follow Up Time: 1 week    Mariza Key  Internal Medicine  [address] [place] [id number]  Phone: [phone]  Fax: [phone]  Established Patient  Follow Up Time: 1 week

## 2023-06-09 NOTE — PROGRESS NOTE ADULT - PROBLEM SELECTOR PLAN 3
CTA chest: 3.7 x 3.2 cm indeterminant left upper retroperitoneal mass. Further evaluation can be performed with MRI.  Possible metastatic disease given reportedly outpt CT A/P significant for mass.  -Holding MRI pending CT Abd/Plv report  review   -Non-urgent Oncology c/s CTA chest: 3.7 x 3.2 cm indeterminant left upper retroperitoneal mass. Further evaluation can be performed with MRI.  Possible metastatic disease given reportedly outpt CT A/P significant for mass.   - will hold off on Oncology consult, pending path results

## 2023-06-09 NOTE — DISCHARGE NOTE PROVIDER - NSDCCPTREATMENT_GEN_ALL_CORE_FT
PRINCIPAL PROCEDURE  Procedure: Endoscopic ultrasound of esophagus  Findings and Treatment: Findings:       ENDOSCOPIC FINDING: :       The examined esophagus was normal.       Patchy inflammation characterized by erythema was found in the gastric        antrum. Biopsies were taken with a cold forceps for Helicobacter pylori        testing.       The examined duodenum was normal.       ENDOSONOGRAPHIC FINDING: :       An anechoic lesion suggestive of a cyst was identified adjacent to the        spleen. The lesion measured 36 mm by 32 mm in maximal cross-sectional        diameter. There were 2 compartments thinly septated. There was no        associated mass. Diagnostic needle aspiration for fluid was performed.        Color Doppler imaging was utilized prior to needle puncture to confirm a        lack of significant vascular structures within the needle path. One pass        was made with the 22 gauge needle using a transgastric approach. Given        viscosity of the fluid, no specimen was obtained. One pass was made with        the 19 gauge needle using a transgastric approach. No stylet was used.        The amount of fluid collected was 20 mL. The cavity appeared collapsed        with fluid aspiration. The fluid was opaque, brown and viscous.        Sample(s) were sent for amylase concentration, cytology and CEA.       No lymphadenopathy seen.                                                                                   Impression:          - Normal esophagus.                       - Gastritis. Biopsied.                       - Normal examined duodenum.                       - A cystic lesion measuring 36 mm by 32 mm was                        visualized endosonographically adjacent to the spleen.                        Fine needle aspiration for fluid performed.

## 2023-06-09 NOTE — PROGRESS NOTE ADULT - SUBJECTIVE AND OBJECTIVE BOX
Patient is a 73y old  Female who presents with a chief complaint of Abdominal pain (08 Jun 2023 13:52)     INTERVAL HPI/OVERNIGHT EVENTS:  - No acute events overnight    SUBJECTIVE  - Patient seen and evaluated at bedside  - Patient reports presence of   - Patient reports absence of fevers, chills, HA, lightheadedness, dizziness, nausea, emesis, chest pain, dyspnea, palpitations, abd pain, diarrhea, urinary symptoms, skin color changes or rashes, or LE edema     MEDICATIONS  (STANDING):  amoxicillin 750 milliGRAM(s) Oral three times a day  aspirin enteric coated 81 milliGRAM(s) Oral daily  atorvastatin 20 milliGRAM(s) Oral at bedtime  dextrose 5%. 1000 milliLiter(s) (100 mL/Hr) IV Continuous <Continuous>  dextrose 5%. 1000 milliLiter(s) (50 mL/Hr) IV Continuous <Continuous>  dextrose 50% Injectable 25 Gram(s) IV Push once  dextrose 50% Injectable 12.5 Gram(s) IV Push once  dextrose 50% Injectable 25 Gram(s) IV Push once  glucagon  Injectable 1 milliGRAM(s) IntraMuscular once  insulin glargine Injectable (LANTUS) 3 Unit(s) SubCutaneous at bedtime  insulin lispro (ADMELOG) corrective regimen sliding scale   SubCutaneous Before meals and at bedtime  insulin lispro Injectable (ADMELOG) 1 Unit(s) SubCutaneous three times a day before meals  levoFLOXacin  Tablet 500 milliGRAM(s) Oral every 24 hours  metoprolol succinate ER 50 milliGRAM(s) Oral daily  pantoprazole    Tablet 40 milliGRAM(s) Oral two times a day  sodium chloride 1 Gram(s) Oral every 8 hours  valsartan 320 milliGRAM(s) Oral daily    MEDICATIONS  (PRN):  acetaminophen     Tablet .. 650 milliGRAM(s) Oral every 6 hours PRN Temp greater or equal to 38C (100.4F), Mild Pain (1 - 3)  dextrose Oral Gel 15 Gram(s) Oral once PRN Blood Glucose LESS THAN 70 milliGRAM(s)/deciliter  melatonin 3 milliGRAM(s) Oral at bedtime PRN Insomnia  ondansetron Injectable 4 milliGRAM(s) IV Push every 8 hours PRN Nausea and/or Vomiting        REVIEW OF SYSTEMS: As indicated above; otherwise, negative    VITAL SIGNS:  T(F): 98.1 (06-09-23 @ 07:19), Max: 98.6 (06-08-23 @ 13:10)  HR: 84 (06-09-23 @ 07:19) (77 - 94)  BP: 132/70 (06-09-23 @ 07:19) (126/63 - 132/70)  RR: 18 (06-09-23 @ 07:19) (18 - 18)  SpO2: 98% (06-09-23 @ 07:19) (97% - 98%)    PHYSICAL EXAM:  General: NAD, well-groomed, well-developed  Eyes: Pupils equal, round, reactive to light, EOMI, anicteric sclera   ENMT: slightly dry mucous membranes  Neck: supple, no JVD, no tender lymphadenopathy   Chest: Clear to auscultation bilaterally; no rales, rhonchi, or wheezing  Heart: Regular rate and rhythm; normal S1 and S2; no murmurs, rubs, or gallops  Abdomen: Soft, diffusely tender to lightly palpation, nondistended, normoactive bowel sounds   Extremities: Warm, well perfused, no peripheral LE edema bilaterally, 4/5 strength in b/l lower extremities  Nervous System: AOx3, no focal neurological deficits   Psych: Appropriate affect    LABS:                        11.1   9.15  )-----------( 188      ( 09 Jun 2023 03:00 )             34.6     09 Jun 2023 03:00    128    |  94     |  9      ----------------------------<  163    4.0     |  21     |  0.73     Ca    8.9        09 Jun 2023 03:00  Phos  3.1       09 Jun 2023 03:00  Mg     1.80      09 Jun 2023 03:00    TPro  6.8    /  Alb  x      /  TBili  x      /  DBili  x      /  AST  x      /  ALT  x      /  AlkPhos  x      08 Jun 2023 10:00  PT/INR - ( 09 Jun 2023 03:00 )   PT: 16.8 sec;   INR: 1.44 ratio         PTT - ( 09 Jun 2023 03:00 )  PTT:28.5 sec  CAPILLARY BLOOD GLUCOSE      POCT Blood Glucose.: 179 mg/dL (09 Jun 2023 07:25)  POCT Blood Glucose.: 140 mg/dL (08 Jun 2023 23:54)  POCT Blood Glucose.: 173 mg/dL (08 Jun 2023 22:02)  POCT Blood Glucose.: 291 mg/dL (08 Jun 2023 18:12)  POCT Blood Glucose.: 207 mg/dL (08 Jun 2023 12:06)  POCT Blood Glucose.: 168 mg/dL (08 Jun 2023 08:35)    BLOOD CULTURE    RADIOLOGY & ADDITIONAL TESTS:    Imaging Personally Reviewed:  [X ] YES     Consultant(s) Notes Reviewed:  Yes    Care Discussed with Consultants/Other Providers: Yes Patient is a 73y old  Female who presents with a chief complaint of Abdominal pain (08 Jun 2023 13:52)     INTERVAL HPI/OVERNIGHT EVENTS:  - No acute events overnight    SUBJECTIVE  - Patient seen and evaluated at bedside. Patient states she continues to feel weak and tired, but has a little more energy today. Patient continues to have abdominal pain, but denies any nausea or vomiting. Patient states that she "managed" her PO diet following endoscopy today. Patient feels constipated today and has not had a BM since admission. Patient denies any fever, chills, chest pain, shortness of breath, or diarrhea.     MEDICATIONS  (STANDING):  amoxicillin 750 milliGRAM(s) Oral three times a day  aspirin enteric coated 81 milliGRAM(s) Oral daily  atorvastatin 20 milliGRAM(s) Oral at bedtime  dextrose 5%. 1000 milliLiter(s) (100 mL/Hr) IV Continuous <Continuous>  dextrose 5%. 1000 milliLiter(s) (50 mL/Hr) IV Continuous <Continuous>  dextrose 50% Injectable 25 Gram(s) IV Push once  dextrose 50% Injectable 12.5 Gram(s) IV Push once  dextrose 50% Injectable 25 Gram(s) IV Push once  glucagon  Injectable 1 milliGRAM(s) IntraMuscular once  insulin glargine Injectable (LANTUS) 3 Unit(s) SubCutaneous at bedtime  insulin lispro (ADMELOG) corrective regimen sliding scale   SubCutaneous Before meals and at bedtime  insulin lispro Injectable (ADMELOG) 1 Unit(s) SubCutaneous three times a day before meals  levoFLOXacin  Tablet 500 milliGRAM(s) Oral every 24 hours  metoprolol succinate ER 50 milliGRAM(s) Oral daily  pantoprazole    Tablet 40 milliGRAM(s) Oral two times a day  sodium chloride 1 Gram(s) Oral every 8 hours  valsartan 320 milliGRAM(s) Oral daily    MEDICATIONS  (PRN):  acetaminophen     Tablet .. 650 milliGRAM(s) Oral every 6 hours PRN Temp greater or equal to 38C (100.4F), Mild Pain (1 - 3)  dextrose Oral Gel 15 Gram(s) Oral once PRN Blood Glucose LESS THAN 70 milliGRAM(s)/deciliter  melatonin 3 milliGRAM(s) Oral at bedtime PRN Insomnia  ondansetron Injectable 4 milliGRAM(s) IV Push every 8 hours PRN Nausea and/or Vomiting        REVIEW OF SYSTEMS: As indicated above; otherwise, negative    VITAL SIGNS:  T(F): 98.1 (06-09-23 @ 07:19), Max: 98.6 (06-08-23 @ 13:10)  HR: 84 (06-09-23 @ 07:19) (77 - 94)  BP: 132/70 (06-09-23 @ 07:19) (126/63 - 132/70)  RR: 18 (06-09-23 @ 07:19) (18 - 18)  SpO2: 98% (06-09-23 @ 07:19) (97% - 98%)    PHYSICAL EXAM:  General: NAD, well-groomed, well-developed  Eyes: Pupils equal, round, reactive to light, EOMI, anicteric sclera   ENMT: slightly dry mucous membranes  Neck: supple, no JVD, no tender lymphadenopathy   Chest: Clear to auscultation bilaterally; no rales, rhonchi, or wheezing  Heart: Regular rate and rhythm; normal S1 and S2; no murmurs, rubs, or gallops  Abdomen: Soft, diffusely tender to lightly palpation, nondistended, normoactive bowel sounds   Extremities: Warm, well perfused, no peripheral LE edema bilaterally, 4/5 strength in b/l lower extremities  Nervous System: AOx3, no focal neurological deficits   Psych: Appropriate affect    LABS:                        11.1   9.15  )-----------( 188      ( 09 Jun 2023 03:00 )             34.6     09 Jun 2023 03:00    128    |  94     |  9      ----------------------------<  163    4.0     |  21     |  0.73     Ca    8.9        09 Jun 2023 03:00  Phos  3.1       09 Jun 2023 03:00  Mg     1.80      09 Jun 2023 03:00    TPro  6.8    /  Alb  x      /  TBili  x      /  DBili  x      /  AST  x      /  ALT  x      /  AlkPhos  x      08 Jun 2023 10:00  PT/INR - ( 09 Jun 2023 03:00 )   PT: 16.8 sec;   INR: 1.44 ratio         PTT - ( 09 Jun 2023 03:00 )  PTT:28.5 sec  CAPILLARY BLOOD GLUCOSE      POCT Blood Glucose.: 179 mg/dL (09 Jun 2023 07:25)  POCT Blood Glucose.: 140 mg/dL (08 Jun 2023 23:54)  POCT Blood Glucose.: 173 mg/dL (08 Jun 2023 22:02)  POCT Blood Glucose.: 291 mg/dL (08 Jun 2023 18:12)  POCT Blood Glucose.: 207 mg/dL (08 Jun 2023 12:06)  POCT Blood Glucose.: 168 mg/dL (08 Jun 2023 08:35)    BLOOD CULTURE    RADIOLOGY & ADDITIONAL TESTS:    Imaging Personally Reviewed:  [X ] YES     Consultant(s) Notes Reviewed:  Yes    Care Discussed with Consultants/Other Providers: Yes

## 2023-06-09 NOTE — DISCHARGE NOTE PROVIDER - NSDCFUADDINST_GEN_ALL_CORE_FT
Please discontinue taking your omperazole and follow up with your GI doctor to ensure your H. Pylori infection has resolved

## 2023-06-09 NOTE — DIETITIAN INITIAL EVALUATION ADULT - ORAL INTAKE PTA/DIET HISTORY
Patient seen for assessment w/ son at bedside. Lethargic during encounter. Reports poor appetite/PO intake x 2 weeks PTA since starting antibiotic therapy. Noted w/ H. pylori PTA per chart review. A1c is 6.1%.

## 2023-06-09 NOTE — DISCHARGE NOTE PROVIDER - NSFOLLOWUPCLINICS_GEN_ALL_ED_FT
[facility]  Hematology/Oncology  [address] [id number]  Phone: [phone]  Fax:   Follow Up Time: 2 weeks     [facility]  Hematology/Oncology  [address] [place] [address]  Phone: [phone]  Fax:   Follow Up Time: 2 weeks    [facility]  Nephrology  [address] [place] [address], [address] [place] [address]  Phone: [phone]  Fax:   Follow Up Time: 2 weeks     [facility]  Hematology/Oncology  [address] [id number]  Phone: [phone]  Fax:   Follow Up Time: 2 weeks

## 2023-06-09 NOTE — DIETITIAN INITIAL EVALUATION ADULT - PROBLEM SELECTOR PLAN 4
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 serum ordered   -Monitor response to IV fluids, 1L NS in ED

## 2023-06-09 NOTE — DISCHARGE NOTE PROVIDER - NSDCCPCAREPLAN_GEN_ALL_CORE_FT
PRINCIPAL DISCHARGE DIAGNOSIS  Diagnosis: Retroperitoneal mass  Assessment and Plan of Treatment: Before you came into the hospital you had a CT scan of your abdomen, which showed a mass right behind a portion of your stomach. As a result, we had the gastroentorology team perform a procedure to take a biopsy of the mass. Please follow up with your GI doctor in 1 week from discharge. Adidtionally, if the biopsy results show any findings concerning for cancer please follow up with the hematolgy/oncology team.     PRINCIPAL DISCHARGE DIAGNOSIS  Diagnosis: Retroperitoneal mass  Assessment and Plan of Treatment: Before you came into the hospital you had a CT scan of your abdomen, which showed a mass right behind a portion of your stomach. As a result, we had the gastroentorology team perform a procedure to take a biopsy of the mass. Please follow up with your GI doctor in 1 week from discharge. Adidtionally, if the biopsy results show any findings concerning for cancer please follow up with the hematolgy/oncology team.  Contact a health care provider if:  You cannot eat or drink without vomiting.  Your sore throat gets worse or lasts longer than a few days.  You have a fever.  Get help right away if:  You have blood in your vomit or stool (feces).  You have black stools.  You have chest pain or shortness of breath.  You have severe pain in your abdomen.  You feel faint or dizzy.       PRINCIPAL DISCHARGE DIAGNOSIS  Diagnosis: Retroperitoneal mass  Assessment and Plan of Treatment: Before you came into the hospital you had a CT scan of your abdomen, which showed a mass right behind a portion of your stomach. As a result, we had the gastroentorology team perform a procedure to take a biopsy of the mass. Please follow up with your GI doctor in 1 week from discharge. Adidtionally, if the biopsy results show any findings concerning for cancer please follow up with the hematolgy/oncology team.  Contact a health care provider if:  You cannot eat or drink without vomiting.  Your sore throat gets worse or lasts longer than a few days.  You have a fever.  Get help right away if:  You have blood in your vomit or stool (feces).  You have black stools.  You have chest pain or shortness of breath.  You have severe pain in your abdomen.  You feel faint or dizzy.        SECONDARY DISCHARGE DIAGNOSES  Diagnosis: Hyponatremia  Assessment and Plan of Treatment: When you came into the hospital your sodium level was low. As a result we started giving you salt tablets and gave you a diuretic to help remove some of the excess water. Additionally, we restricted the amount of water/liquid you have to 1L a day Please continue take the salt tablets and lasix as prescibed. Additionally, please continue to limit your water intake to ensure that your sodium level continues to improve. Please follow up with the kidney doctors in 2 weeks.  Contact a health care provider if:  You develop worsening nausea, fatigue, headache, confusion, or weakness.  Your symptoms go away and then return.  Get help right away if:  You have a seizure.  You faint.  You have ongoing diarrhea or vomiting.     PRINCIPAL DISCHARGE DIAGNOSIS  Diagnosis: Retroperitoneal mass  Assessment and Plan of Treatment: Before you came into the hospital you had a CT scan of your abdomen, which showed a mass right behind a portion of your stomach. As a result, we had the gastroentorology team perform a procedure to take a biopsy of the mass. Please follow up with your GI doctor in 1 week from discharge. Adidtionally, if the biopsy results show any findings concerning for cancer please follow up with the hematolgy/oncology team.  Contact a health care provider if:  You cannot eat or drink without vomiting.  Your sore throat gets worse or lasts longer than a few days.  You have a fever.  Get help right away if:  You have blood in your vomit or stool (feces).  You have black stools.  You have chest pain or shortness of breath.  You have severe pain in your abdomen.  You feel faint or dizzy.        SECONDARY DISCHARGE DIAGNOSES  Diagnosis: Hyponatremia  Assessment and Plan of Treatment: When you came into the hospital your sodium level was low. As a result we started giving you salt tablets and gave you a diuretic to help remove some of the excess water. Additionally, we restricted the amount of water/liquid you have to 1L a day Please continue take the salt tablets and lasix as prescibed. Additionally, please continue to limit your water intake to ensure that your sodium level continues to improve. Please follow up with the primary care physician in 1- 2 weeks.  Contact a health care provider if:  You develop worsening nausea, fatigue, headache, confusion, or weakness.  Your symptoms go away and then return.  Get help right away if:  You have a seizure.  You faint.  You have ongoing diarrhea or vomiting.

## 2023-06-09 NOTE — DIETITIAN INITIAL EVALUATION ADULT - PERTINENT LABORATORY DATA
06-09    128<L>  |  94<L>  |  9   ----------------------------<  163<H>  4.0   |  21<L>  |  0.73    Ca    8.9      09 Jun 2023 03:00  Phos  3.1     06-09  Mg     1.80     06-09    TPro  6.8  /  Alb  x   /  TBili  x   /  DBili  x   /  AST  x   /  ALT  x   /  AlkPhos  x   06-08  POCT Blood Glucose.: 135 mg/dL (06-09-23 @ 12:34)  A1C with Estimated Average Glucose Result: 6.1 % (06-08-23 @ 07:04)  A1C with Estimated Average Glucose Result: 6.4 % (06-07-23 @ 15:02)

## 2023-06-09 NOTE — PHYSICAL THERAPY INITIAL EVALUATION ADULT - PERTINENT HX OF CURRENT PROBLEM, REHAB EVAL
73 yoF PMH HTN, HLD, DM on oral meds, H.Pylori on triple abx therapy, p/w SOB, progressively worse w/ exertion over past 2-3 weeks. Pt recently underwent CT scan revealing mass in stomach c/w possible neoplasm 74 y/o Female PMHx HTN, HLD, DM on oral meds, H.Pylori on triple abx therapy, p/w SOB, progressively worse w/ exertion over past 2-3 weeks. Pt recently underwent CT scan revealing mass in stomach c/w possible neoplasm

## 2023-06-09 NOTE — PROGRESS NOTE ADULT - PROBLEM SELECTOR PLAN 5
Blood glucose level on admission 189  - holding oral home medications  - will continue with DREW  - A1c was 6.1

## 2023-06-09 NOTE — PROGRESS NOTE ADULT - PROBLEM SELECTOR PLAN 6
- called daughter this morning, patient is bringing in all home medications sometime this afternoon, will f/u DVT ppc: lovenox   Diet: Consistent carbs,   Dispo: medically active DVT ppx: lovenox   Diet: Consistent carbs,   Dispo: medically active

## 2023-06-09 NOTE — DIETITIAN INITIAL EVALUATION ADULT - OTHER INFO
73 year old female with a PMH of HTN, HLD, DM Type 2, H. Pylori on triple antibiotic therapy a/w SOB of unclear etiology, and epigastric pain; Found to have new 3.7 x 3.2 cm indeterminant left upper retroperitoneal mass, now s/p EGD per chart.    Patient reports continued decreased appetite. Dislikes current diet consistency. Reports no chewing/swallowing difficulties PTA. No GI distress reported at this time. Has no food allergies. Reports noticing weight loss. Per HIE, noted w/ weight 67.6 kg (3/22). ABW is 65 kg (6/9) per chart indicating a -3.9% weight loss x 3 months. No edema or pressure injuries noted per RN flow sheet.    Patients son w/ questions regarding overall diet given clinical course. Discussed continue monitoring carbohydrate portions for glycemic control, follow small frequent meal pattern, choose lean proteins and consume soft fruits/vegetables for gut health.

## 2023-06-09 NOTE — DISCHARGE NOTE PROVIDER - NSFOLLOWUPCLINICSTOKEN_GEN_ALL_ED_FT
214946:2 weeks|| ||00\01||False; 468761:2 weeks|| ||00\01||False;405385:2 weeks|| ||00\01||False; 801999:2 weeks|| ||00\01||False;

## 2023-06-09 NOTE — PROGRESS NOTE ADULT - ATTENDING COMMENTS
73F with PMH of HTN, HLD, DM2, AVB s/p PPM, H pylori on triple therapy here w/ SOB/wt loss/abd pain. Imaging concerning for RP mass. Admitted for further evaluation. GI on board. Pt went for endoscopy and had cystic lesion adjacent to spleen aspirated for testing. Clinically stable.    Pt seen at bedside. No o/n events.     -GI recs appreciated. Monitor for next 24h post-bx to ensure no post-procedure complication. F/u path results.   -Sodium already improving. Poss SIADH. Cont salt tabs. Fluid restrict. Trend BMP.  -Nutrition eval.  -PT eval - home PT.    Rest of plan as above. Weekend DC possible if she remains stable and sx improved.

## 2023-06-10 NOTE — PROGRESS NOTE ADULT - ATTENDING COMMENTS
Patient seen and examined with the GI fellow. I agree with the above assessment and plan. Thank you for allowing us to care for your patient.    Post EUS-FNB yesterday of an RP mass. Awaiting pathology. Follow up with Dr Perez.

## 2023-06-10 NOTE — SWALLOW BEDSIDE ASSESSMENT ADULT - COMMENTS
Per Internal Medicine 6/10, "72yo Female with MHx of HTN, HLD, DM Type 2, AVB s/p PPM , H. Pylori on triple Abx therapy a/w SOB of unclear etiology, and epigastric pain; Found to have new 3.7 x 3.2 cm indeterminant left upper retroperitoneal mass. S/p  EUS with biopsy with GI"     CXR 6/7: IMPRESSION: Clear lungs.    Order received & completed for bedside swallow evaluation. Patient received upright at edge of bed, awake/ alert, oriented, able to make wants/ needs known & follow basic directives. Patient reporting 8/10 pain to right neck (onset 6/9) with continued abdominal discomfort RN aware. Patient left as received NAD.

## 2023-06-10 NOTE — CONSULT NOTE ADULT - PROBLEM SELECTOR RECOMMENDATION 9
Pt with hyponatremia in the setting of possible malignancy, 2L free water intake, decreased PO intake and has some B/L LE edema on exam. SNa on admission of 125, slightly improved to 128 but decreased to 126 (127 when corrected for hyperglycemia). Urine osm: 484, urine Na: 135. 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 Recommend fluid restriction to 1.5L of free water for now. Resume salt tabs 1g TID. May benefit from diuretics if hyponatremia persists. Optimize glucose control. TSH within normal limits. BP has been stable, if hypotension noted then recommend am/random serum cortisol to r/o adrenal insufficiency. Recommend uric acid level. GI notes reviewed. Monitor SNa. English

## 2023-06-10 NOTE — SWALLOW BEDSIDE ASSESSMENT ADULT - ASR SWALLOW REFERRAL
Patient complaining of right neck pain at baseline & with swallowing since 6/9/ENT Consider ENT consult: patient complaining of right neck pain at baseline & with swallowing since 6/9 (endoscopy)/ENT

## 2023-06-10 NOTE — PROGRESS NOTE ADULT - SUBJECTIVE AND OBJECTIVE BOX
Gastroenterology Progress Note    Interval Events:   Pt overall feeling well post procedure. Still having some ongoing abd pain although improving compared to prior. Denies any n/v and able to tolerate small amount of PO such as fruit    Allergies:  No Known Allergies      Hospital Medications:  acetaminophen     Tablet .. 650 milliGRAM(s) Oral every 6 hours PRN  amoxicillin 750 milliGRAM(s) Oral three times a day  aspirin enteric coated 81 milliGRAM(s) Oral daily  atorvastatin 20 milliGRAM(s) Oral at bedtime  dextrose 5%. 1000 milliLiter(s) IV Continuous <Continuous>  dextrose 5%. 1000 milliLiter(s) IV Continuous <Continuous>  dextrose 50% Injectable 25 Gram(s) IV Push once  dextrose 50% Injectable 12.5 Gram(s) IV Push once  dextrose 50% Injectable 25 Gram(s) IV Push once  dextrose Oral Gel 15 Gram(s) Oral once PRN  glucagon  Injectable 1 milliGRAM(s) IntraMuscular once  insulin glargine Injectable (LANTUS) 3 Unit(s) SubCutaneous at bedtime  insulin lispro (ADMELOG) corrective regimen sliding scale   SubCutaneous Before meals and at bedtime  insulin lispro Injectable (ADMELOG) 1 Unit(s) SubCutaneous three times a day before meals  levoFLOXacin  Tablet 500 milliGRAM(s) Oral every 24 hours  melatonin 3 milliGRAM(s) Oral at bedtime PRN  metoprolol succinate ER 50 milliGRAM(s) Oral daily  ondansetron Injectable 4 milliGRAM(s) IV Push every 8 hours PRN  pantoprazole    Tablet 40 milliGRAM(s) Oral two times a day  senna 2 Tablet(s) Oral once  valsartan 320 milliGRAM(s) Oral daily      ROS: 14 point ROS negative unless otherwise state in subjective    PHYSICAL EXAM:   Vital Signs:  Vital Signs Last 24 Hrs  T(C): 37.3 (10 Pineda 2023 05:05), Max: 37.3 (09 Jun 2023 21:18)  T(F): 99.1 (10 Pineda 2023 05:05), Max: 99.1 (09 Jun 2023 21:18)  HR: 83 (10 Pineda 2023 05:05) (70 - 86)  BP: 114/62 (10 Pineda 2023 05:05) (107/89 - 129/89)  BP(mean): --  RR: 16 (10 Pineda 2023 05:05) (16 - 23)  SpO2: 96% (10 Pineda 2023 05:05) (95% - 98%)    Parameters below as of 10 Pineda 2023 05:05  Patient On (Oxygen Delivery Method): room air      GENERAL:  No acute distress  HEENT:  NCAT  CHEST: no resp distress  HEART:  RRR  ABDOMEN:  Soft, non-tender, non-distended, normoactive bowel sounds  EXTREMITIES:  No edema  NEURO:  Alert and oriented x 3    LABS:                        11.1   9.15  )-----------( 188      ( 09 Jun 2023 03:00 )             34.6       06-09    128<L>  |  94<L>  |  9   ----------------------------<  163<H>  4.0   |  21<L>  |  0.73    Ca    8.9      09 Jun 2023 03:00  Phos  3.1     06-09  Mg     1.80     06-09    TPro  6.8  /  Alb  x   /  TBili  x   /  DBili  x   /  AST  x   /  ALT  x   /  AlkPhos  x   06-08    LIVER FUNCTIONS - ( 08 Jun 2023 10:00 )  Alb: x     / Pro: 6.8 g/dL / ALK PHOS: x     / ALT: x     / AST: x     / GGT: x           PT/INR - ( 09 Jun 2023 03:00 )   PT: 16.8 sec;   INR: 1.44 ratio   PTT - ( 09 Jun 2023 03:00 )  PTT:28.5 sec    Imaging:  < from: Upper EUS (06.09.23 @ 07:27) >                                                              Findings:       ENDOSCOPIC FINDING: :       The examined esophagus was normal.       Patchy inflammation characterized by erythema was found in the gastric        antrum. Biopsies were takenwith a cold forceps for Helicobacter pylori        testing.       The examined duodenum was normal.       ENDOSONOGRAPHIC FINDING: :       An anechoic lesion suggestive of a cyst was identified adjacent to the        spleen. The lesion measured 36 mmby 32 mm in maximal cross-sectional        diameter. There were 2 compartments thinly septated. There was no        associated mass. Diagnostic needle aspiration for fluid was performed.        Color Doppler imaging was utilized prior to needle puncture to confirm a        lack of significant vascular structures within the needle path. One pass        was made with the 22 gauge needle using a transgastric approach. Given        viscosity of the fluid, no specimen was obtained. One pass was made with        the 19 gauge needle using a transgastric approach. No stylet was used.        The amount of fluid collected was 20 mL. The cavity appeared collapsed        with fluid aspiration. The fluid was opaque, brown and viscous.        Sample(s) were sent for amylase concentration, cytology and CEA.       No lymphadenopathy seen.                                                                                   Impression:          - Normal esophagus.                       - Gastritis. Biopsied.                       - Normal examined duodenum.                       - A cystic lesion measuring 36 mm by 32 mm was                        visualized endosonographically adjacent to the spleen.                        Fine needle aspiration for fluid performed.  Recommendation:      - Return patient to [facility] for ongoing care.                       - Await cytology results and await path results.                       - Resume previous diet                       - Continue at least 2-3 days of antibiotics                        (Levofloxacin or Cipro).    < end of copied text >

## 2023-06-10 NOTE — PROGRESS NOTE ADULT - PROBLEM SELECTOR PLAN 4
Suspect pseudohyponatremia as serum osmolarity is 276. Possibly related to gamma gap    - TSH wnl  - continue to monitor Na level Suspect pseudohyponatremia as serum osmolarity is 276. Possibly related to gamma gap  vs. SIADH 2/2 malignancy  - TSH wnl  - continue to monitor Na level  - nephro consulted

## 2023-06-10 NOTE — PROGRESS NOTE ADULT - PROBLEM SELECTOR PLAN 3
CTA chest: 3.7 x 3.2 cm indeterminant left upper retroperitoneal mass. Further evaluation can be performed with MRI.  Possible metastatic disease given reportedly outpt CT A/P significant for mass.   - will hold off on Oncology consult, pending path results

## 2023-06-10 NOTE — SWALLOW BEDSIDE ASSESSMENT ADULT - PHARYNGEAL PHASE
Throat clear post oral intake Cough post oral intake/Throat clear post oral intake/Delayed cough post oral intake Within functional limits

## 2023-06-10 NOTE — SWALLOW BEDSIDE ASSESSMENT ADULT - CONSISTENCIES ADMINISTERED
2 tsp trials puree/ soft & bite-sized; 1/2 cracker easy to chew/pureed/soft & bite-sized/easy to chew ~4 oz/thin liquid ~3 oz/mildly thick regular solid

## 2023-06-10 NOTE — PROGRESS NOTE ADULT - SUBJECTIVE AND OBJECTIVE BOX
Patient is a 73y old  Female who presents with a chief complaint of Abdominal pain (10 Pineda 2023 07:38)      SUBJECTIVE / OVERNIGHT EVENTS:  - no events overnight; vital signs stable    MEDICATIONS  (STANDING):  amoxicillin 750 milliGRAM(s) Oral three times a day  aspirin enteric coated 81 milliGRAM(s) Oral daily  atorvastatin 20 milliGRAM(s) Oral at bedtime  dextrose 5%. 1000 milliLiter(s) (100 mL/Hr) IV Continuous <Continuous>  dextrose 5%. 1000 milliLiter(s) (50 mL/Hr) IV Continuous <Continuous>  dextrose 50% Injectable 25 Gram(s) IV Push once  dextrose 50% Injectable 12.5 Gram(s) IV Push once  dextrose 50% Injectable 25 Gram(s) IV Push once  glucagon  Injectable 1 milliGRAM(s) IntraMuscular once  insulin glargine Injectable (LANTUS) 3 Unit(s) SubCutaneous at bedtime  insulin lispro (ADMELOG) corrective regimen sliding scale   SubCutaneous Before meals and at bedtime  insulin lispro Injectable (ADMELOG) 1 Unit(s) SubCutaneous three times a day before meals  levoFLOXacin  Tablet 500 milliGRAM(s) Oral every 24 hours  metoprolol succinate ER 50 milliGRAM(s) Oral daily  pantoprazole    Tablet 40 milliGRAM(s) Oral two times a day  senna 2 Tablet(s) Oral once  valsartan 320 milliGRAM(s) Oral daily    MEDICATIONS  (PRN):  acetaminophen     Tablet .. 650 milliGRAM(s) Oral every 6 hours PRN Temp greater or equal to 38C (100.4F), Mild Pain (1 - 3)  dextrose Oral Gel 15 Gram(s) Oral once PRN Blood Glucose LESS THAN 70 milliGRAM(s)/deciliter  melatonin 3 milliGRAM(s) Oral at bedtime PRN Insomnia  ondansetron Injectable 4 milliGRAM(s) IV Push every 8 hours PRN Nausea and/or Vomiting      PHYSICAL EXAM:  Vital Signs Last 24 Hrs  T(C): 37.3 (10 Pineda 2023 05:05), Max: 37.3 (09 Jun 2023 21:18)  T(F): 99.1 (10 Pineda 2023 05:05), Max: 99.1 (09 Jun 2023 21:18)  HR: 83 (10 Pineda 2023 05:05) (70 - 86)  BP: 114/62 (10 Pineda 2023 05:05) (107/89 - 129/89)  BP(mean): --  RR: 16 (10 Pineda 2023 05:05) (16 - 23)  SpO2: 96% (10 Pineda 2023 05:05) (95% - 98%)    Parameters below as of 10 Pineda 2023 05:05  Patient On (Oxygen Delivery Method): room air      General: NAD, well-groomed, well-developed  Eyes: Pupils equal, round, reactive to light, EOMI, anicteric sclera   ENMT: slightly dry mucous membranes  Neck: supple, no JVD, no tender lymphadenopathy   Chest: Clear to auscultation bilaterally; no rales, rhonchi, or wheezing  Heart: Regular rate and rhythm; normal S1 and S2; no murmurs, rubs, or gallops  Abdomen: Soft, diffusely tender to lightly palpation, nondistended, normoactive bowel sounds   Extremities: Warm, well perfused, no peripheral LE edema bilaterally, 4/5 strength in b/l lower extremities  Nervous System: AOx3, no focal neurological deficits   Psych: Appropriate affect    ----  I&O's Summary    ----  LABS:                        11.1   9.15  )-----------( 188      ( 09 Jun 2023 03:00 )             34.6     ----  06-09    128<L>  |  94<L>  |  9   ----------------------------<  163<H>  4.0   |  21<L>  |  0.73    Ca    8.9      09 Jun 2023 03:00  Phos  3.1     06-09  Mg     1.80     06-09    TPro  6.8  /  Alb  x   /  TBili  x   /  DBili  x   /  AST  x   /  ALT  x   /  AlkPhos  x   06-08    ----  PT/INR - ( 09 Jun 2023 03:00 )   PT: 16.8 sec;   INR: 1.44 ratio         PTT - ( 09 Jun 2023 03:00 )  PTT:28.5 sec  ----      ----    ----      RADIOLOGY & ADDITIONAL TESTS:  Results Reviewed:   Imaging Personally Reviewed:  Electrocardiogram Personally Reviewed:

## 2023-06-10 NOTE — PROGRESS NOTE ADULT - ASSESSMENT
73F Hx of HTN, HLD, DM Type 2, AVB s/p PPM , H. Pylori on triple Abx therapy, c/o SOB, progressively worse w/ exertion over past 2-3 weeks. Found to have RP mass on CT a/p, which was previously seen + underwent EUS/FNA with non-diagnostic path. [facility] now consulted for biopsy.     Impression:  #RP Mass: indeterminant left upper retroperitoneal mass measures 3.7 x 3.2 cm in between the gastric fundus and and spleen.  --Previously seen on CT a/p in 2012 + currently appears to be of similar size. 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 Underwent EUS/FNA in 2012: showed lesion was hypoechoic and had a layered wall and posterior enhancement c/w cystic lesion + non-diagnostic cytology from FNA. Now s/p repeat EGD+EUS/FNA on 6/9/2023 notable for gastritis s/p biopsy in addition to FNA of the cystic lesion. Currently feeling well post procedure with no ongoing n/v and decreasing abd pain.         #Hx H pylori, currently on triple tx      Recommendations:   - Diet as tolerated   - Await cytology results and await path results  - Follow up gastric biopsy results   - Continue at least 2-3 days of antibiotics  (Levofloxacin or Cipro)  -Continue triple tx for underlying H. Pylori infection and confirm eradication in 6-8 weeks     All recommendations are tentative until note is attested by attending.     Alec Chamberlain, PGY-4  Gastroenterology/Hepatology Fellow  Available on Microsoft Teams   [phone] (Long Range Pager)  [phone] (Short Range Pager [facility])    After 5pm, please contact the on-call GI fellow. [phone]

## 2023-06-10 NOTE — PROGRESS NOTE ADULT - ASSESSMENT
74yo Female with MHx of HTN, HLD, DM Type 2, AVB s/p PPM , H. Pylori on triple Abx therapy a/w SOB of unclear etiology, and epigastric pain; Found to have new 3.7 x 3.2 cm indeterminant left upper retroperitoneal mass. S/p  EUS with biopsy with GI

## 2023-06-10 NOTE — PROGRESS NOTE ADULT - PROBLEM SELECTOR PLAN 2
Unclear etiology; Not wheezing on exam; Possible HFpEF, mild;   CXR: clear lungs, no PTX or effusions  CTA chest: no PE  Recent TTE, 29/3/23: Mild diastolic dysfunction (Stage I) and EF=62%  Elevated Pro-BNP, 486 (maybe normal for age), Trop 13 x2  EKG: LBBB, not acute   c/w BB, ASA, Statin   Interrogation of pacemaker by EP on 6/8, showed V paced 99% of the time with normal sensing and pacing with excellent threshold capture.   a1c was 6.1  TSH wnl  cardiology consulted for pre-op evaluation, will f/u  Shortness of breath has Resolved

## 2023-06-10 NOTE — SWALLOW BEDSIDE ASSESSMENT ADULT - SWALLOW EVAL: DIAGNOSIS
1. Functional oral stage for puree, soft & bite-sized, easy to chew, regular solids, mildly thick liquids & thin liquids marked by adequate acceptance & containment, adequate mastication of solids, adequate anterior to posterior transport & adequate oral clearance across trialed consistencies. 2. Moderate pharyngeal dysphagia suspected for regular solids & thin liquids marked by initiation of the pharyngeal swallow & hyolaryngeal excursion upon digital palpation with throat clearing noted post swallow with regular solids & throat clearing, immediate cough post swallow & delayed cough noted with thin liquids.

## 2023-06-10 NOTE — CONSULT NOTE ADULT - SUBJECTIVE AND OBJECTIVE BOX
[facility] DIVISION OF KIDNEY DISEASES AND HYPERTENSION -- [phone]  -- INITIAL CONSULT NOTE  --------------------------------------------------------------------------------  HPI: 74 yo F with a PMH of HTN who presented to [facility] due to abdominal pain, found to have new 3.7 x 3.2 cm indeterminant left upper retroperitoneal mass, now s/p EUS. SNa 125 on admission, now 127-128, Nephrology consulted for hyponatremia.    Pt seen and examined at bedside. She had a 1L water jug at bedside, states she drinks about 2 of those per day. She has noted some leg swelling and has abdominal pain and constipation. Denied fevers, chills, nausea, vomiting. Has been eating bland foods. Per primary team, she had been receiving "prn salt tabs". She denies any new medications.     PAST HISTORY  --------------------------------------------------------------------------------  PAST MEDICAL & SURGICAL HISTORY:  HTN (Hypertension)  Backpain  S/P Cataract Surgery  Retina  s/p macular hole repair    FAMILY HISTORY:  No pertinent family history in first degree relatives    PAST SOCIAL HISTORY: Non smoker     ALLERGIES & MEDICATIONS  --------------------------------------------------------------------------------  Allergies  No Known Allergies  Intolerances    Standing Inpatient Medications  amoxicillin 750 milliGRAM(s) Oral three times a day  aspirin enteric coated 81 milliGRAM(s) Oral daily  atorvastatin 20 milliGRAM(s) Oral at bedtime  dextrose 5%. 1000 milliLiter(s) IV Continuous <Continuous>  dextrose 5%. 1000 milliLiter(s) IV Continuous <Continuous>  dextrose 50% Injectable 25 Gram(s) IV Push once  dextrose 50% Injectable 12.5 Gram(s) IV Push once  dextrose 50% Injectable 25 Gram(s) IV Push once  glucagon  Injectable 1 milliGRAM(s) IntraMuscular once  insulin glargine Injectable (LANTUS) 3 Unit(s) SubCutaneous at bedtime  insulin lispro (ADMELOG) corrective regimen sliding scale   SubCutaneous Before meals and at bedtime  insulin lispro Injectable (ADMELOG) 1 Unit(s) SubCutaneous three times a day before meals  levoFLOXacin  Tablet 500 milliGRAM(s) Oral every 24 hours  metoprolol succinate ER 50 milliGRAM(s) Oral daily  pantoprazole    Tablet 40 milliGRAM(s) Oral two times a day  senna 2 Tablet(s) Oral once  sodium chloride 1 Gram(s) Oral three times a day  valsartan 320 milliGRAM(s) Oral daily    PRN Inpatient Medications  acetaminophen     Tablet .. 650 milliGRAM(s) Oral every 6 hours PRN  dextrose Oral Gel 15 Gram(s) Oral once PRN  melatonin 3 milliGRAM(s) Oral at bedtime PRN  ondansetron Injectable 4 milliGRAM(s) IV Push every 8 hours PRN  polyethylene glycol 3350 17 Gram(s) Oral two times a day PRN    REVIEW OF SYSTEMS  --------------------------------------------------------------------------------  Gen: No fevers/chills  Head/Eyes/Ears: No HA  Respiratory: No dyspnea, cough  CV: No chest pain  GI: per HPI  : No dysuria, hematuria  MSK: per HPI  Skin: No rashes  Heme: No easy bruising or bleeding    All other systems were reviewed and are negative, except as noted.    VITALS/PHYSICAL EXAM  --------------------------------------------------------------------------------  T(C): 37.4 (06-10-23 @ 14:19), Max: 37.4 (06-10-23 @ 14:19)  HR: 78 (06-10-23 @ 14:19) (78 - 86)  BP: 120/70 (06-10-23 @ 14:19) (114/62 - 126/66)  RR: 17 (06-10-23 @ 14:19) (16 - 18)  SpO2: 99% (06-10-23 @ 14:19) (95% - 99%)  Wt(kg): --  Height (cm): 162.6 (06-09-23 @ 07:48)  Weight (kg): 65 (06-09-23 @ 07:19)  BMI (kg/m2): 24.6 (06-09-23 @ 07:48)  BSA (m2): 1.7 (06-09-23 @ 07:48)    Physical Exam:  	Gen: Sitting up in bed and in NAD  	HEENT: Anicteric  	Pulm: CTA B/L  	CV: S1S2+  	Abd: Soft, +BS    	Ext: Trace LE edema B/L  	Neuro: Awake  	Skin: Warm and dry    LABS/STUDIES  --------------------------------------------------------------------------------              11.0   9.65  >-----------<  186      [06-10-23 @ 05:43]              35.2     126  |  95  |  7   ----------------------------<  148      [06-10-23 @ 05:43]  4.0   |  23  |  0.79        Ca     8.9     [06-10-23 @ 05:43]      Mg     1.70     [06-10-23 @ 05:43]      Phos  3.3     [06-10-23 @ 05:43]    PT/INR: PT 16.8 , INR 1.44       [06-09-23 @ 03:00]  PTT: 28.5       [06-09-23 @ 03:00]    Creatinine Trend:  SCr 0.79 [06-10 @ 05:43]  SCr 0.73 [06-09 @ 03:00]  SCr 0.77 [06-08 @ 15:55]  SCr 0.80 [06-08 @ 07:04]  SCr 0.84 [06-07 @ 15:02]    Urinalysis - [09-11-22 @ 13:51]      Color Yellow / Appearance Clear / SG 1.010 / pH 8.0      Gluc Negative / Ketone Negative  / Bili Negative / Urobili Negative       Blood Negative / Protein Negative / Leuk Est Negative / Nitrite Negative      RBC  / WBC  / Hyaline  / Gran  / Sq Epi  / Non Sq Epi  / Bacteria     Urine Protein 51      [06-08-23 @ 12:26]  Urine Sodium 135      [06-08-23 @ 05:09]  Urine Osmolality 484      [06-08-23 @ 05:09]    HCV 0.13, Nonreact      [06-08-23 @ 07:04]    Free Light Chains: kappa 8.85, lambda 6.12, ratio = 1.45      [06-08 @ 10:00]

## 2023-06-10 NOTE — SWALLOW BEDSIDE ASSESSMENT ADULT - ASR SWALLOW RECOMMEND DIAG
Objective testing not warranted at this time given patient with overt s/s penetration/ aspiration with regular solids & thin liquids only & clear chest imaging

## 2023-06-10 NOTE — SWALLOW BEDSIDE ASSESSMENT ADULT - ADDITIONAL RECOMMENDATIONS
This department to follow up to ensure tolerance of recommended PO as schedule permits. Medical team further advised to reconsult this department if patient is with a change in medical status or change in tolerance of recommended PO.

## 2023-06-10 NOTE — CONSULT NOTE ADULT - ATTENDING COMMENTS
Patient seen and examined with the GI fellow. I agree with the above assessment and plan. Thank you for allowing us to care for your patient.    Plan for [id number] once medically optimized.
Patient with hyponatremia, admitted with abdominal pain.  Evaluation as noted above.  1. 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  Fluid restrict and NaCl tabs as noted above.  Can consider other interventions per course, i.e UreNa or Tolvaptan.  Repeat serial labs.  See above discussion.

## 2023-06-10 NOTE — PROGRESS NOTE ADULT - ATTENDING COMMENTS
Patient seen and examined by myself , case discussed  with resident ,agree with the above finding and plan  73F with PMH of HTN, HLD, DM2, AVB s/p PPM, H pylori on triple therapy here w/ SOB/wt loss/abd pain. Imaging concerning for RP mass. Admitted for further evaluation. GI on board. Pt went for endoscopy and had cystic lesion adjacent to spleen aspirated for testing. Clinically stable.    Pt seen at bedside. No o/n events.     -GI recs appreciated. Monitor for next 24h post-bx to ensure no post-procedure complication. F/u path results.   -Sodium 126 today , pt s/p 2 doses of salt tab yesterday, will request Renal eval   -Nutrition eval.  -PT eval - home PT.    Rest of plan as above.  DC in am if sodium improved and   if she remains stable and sx improved.  plan of care d/w patient at bedside

## 2023-06-11 NOTE — PROGRESS NOTE ADULT - PROBLEM SELECTOR PLAN 1
Outpt CT A/P reportedly significant for gastric mass;   CT Abdomen and Pelvis with contrast from 6/5 showed 4.1cm x 3.2cm structure posterior to the stomach with crescentic enhancing component. 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 Gurwinder Rosado, does not see patients at [facility], consulted in house GI for possible EUS with biopsy  - S/p EUS with biopsy on 6/9. During endoscopy, an FNA of a cystic lesion adjacent to the spleen was performed  - f/u cytology and path   - continue Levaquin for 2-3 days   - GI following, recs appreciated  - will continue patient's H. Pylori treatment, will be completed on 6/12

## 2023-06-11 NOTE — PROGRESS NOTE ADULT - PROBLEM SELECTOR PLAN 1
Pt with hyponatremia in the setting of possible malignancy, 2L free water intake, decreased PO intake and has some B/L LE edema on exam. SNa on admission of 125, slightly improved to 128 but decreased to 126 (127 when corrected for hyperglycemia) and remains at ~127. Urine osm: 484, urine Na: 135. 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 Recommend fluid restriction to 1.5L of free water for now. Recommend IV Lasix 20mg x1 today and increase salt tabs to 2g TID. Would also recommend starting oral lasix 20mg daily tomorrow morning. Optimize glucose control. TSH and Uric Acid within normal limits. Monitor SNa.

## 2023-06-11 NOTE — PROGRESS NOTE ADULT - PROBLEM SELECTOR PLAN 6
DVT ppx: lovenox   Diet: Consistent carbs,   Dispo: home with outpatient PT DVT ppx: lovenox   Diet: Easy to chew, consistent carbs,   Dispo: home with outpatient PT

## 2023-06-11 NOTE — PROGRESS NOTE ADULT - ATTENDING COMMENTS
Patient seen and examined by myself , case discussed  with resident ,agree with the above finding and plan  73F with PMH of HTN, HLD, DM2, AVB s/p PPM, H pylori on triple therapy here w/ SOB/wt loss/abd pain. Imaging concerning for RP mass. Admitted for further evaluation. GI on board. Pt went for endoscopy and had cystic lesion adjacent to spleen aspirated for testing. Clinically stable.  pt c/o abdominal pain, but slightly improved from yesterday , had BM yesterday      -GI recs appreciated. F/u path results.   -Sodium 126 again today , Renal eval and f/u appreciated, will increase salt tab to 2 mg TID and start lasix as recommended by renal monitor BMP    -Nutrition eval.  -PT eval - home PT.    Rest of plan as above.  DC in am if sodium improved and   if she remains stable and sx improved.  plan of care d/w patient at bedside

## 2023-06-11 NOTE — PROGRESS NOTE ADULT - SUBJECTIVE AND OBJECTIVE BOX
Patient is a 73y old  Female who presents with a chief complaint of Abdominal pain (10 Pineda 2023 16:15)     INTERVAL HPI/OVERNIGHT EVENTS:  - No acute events overnight    SUBJECTIVE  - Patient seen and evaluated at bedside  - Patient reports presence of   - Patient reports absence of fevers, chills, HA, lightheadedness, dizziness, nausea, emesis, chest pain, dyspnea, palpitations, abd pain, diarrhea, urinary symptoms, skin color changes or rashes, or LE edema     MEDICATIONS  (STANDING):  amoxicillin 750 milliGRAM(s) Oral three times a day  aspirin enteric coated 81 milliGRAM(s) Oral daily  atorvastatin 20 milliGRAM(s) Oral at bedtime  dextrose 5%. 1000 milliLiter(s) (100 mL/Hr) IV Continuous <Continuous>  dextrose 5%. 1000 milliLiter(s) (50 mL/Hr) IV Continuous <Continuous>  dextrose 50% Injectable 25 Gram(s) IV Push once  dextrose 50% Injectable 12.5 Gram(s) IV Push once  dextrose 50% Injectable 25 Gram(s) IV Push once  glucagon  Injectable 1 milliGRAM(s) IntraMuscular once  insulin glargine Injectable (LANTUS) 3 Unit(s) SubCutaneous at bedtime  insulin lispro (ADMELOG) corrective regimen sliding scale   SubCutaneous Before meals and at bedtime  insulin lispro Injectable (ADMELOG) 1 Unit(s) SubCutaneous three times a day before meals  levoFLOXacin  Tablet 500 milliGRAM(s) Oral every 24 hours  metoprolol succinate ER 50 milliGRAM(s) Oral daily  pantoprazole    Tablet 40 milliGRAM(s) Oral two times a day  senna 2 Tablet(s) Oral once  sodium chloride 1 Gram(s) Oral three times a day  valsartan 320 milliGRAM(s) Oral daily    MEDICATIONS  (PRN):  acetaminophen     Tablet .. 650 milliGRAM(s) Oral every 6 hours PRN Temp greater or equal to 38C (100.4F), Mild Pain (1 - 3)  dextrose Oral Gel 15 Gram(s) Oral once PRN Blood Glucose LESS THAN 70 milliGRAM(s)/deciliter  melatonin 3 milliGRAM(s) Oral at bedtime PRN Insomnia  ondansetron Injectable 4 milliGRAM(s) IV Push every 8 hours PRN Nausea and/or Vomiting  polyethylene glycol 3350 17 Gram(s) Oral two times a day PRN Constipation        REVIEW OF SYSTEMS: As indicated above; otherwise, negative    VITAL SIGNS:  T(F): 98.3 (06-11-23 @ 05:30), Max: 99.4 (06-10-23 @ 14:19)  HR: 82 (06-11-23 @ 05:30) (78 - 82)  BP: 102/67 (06-11-23 @ 05:30) (102/67 - 120/70)  RR: 18 (06-11-23 @ 05:30) (17 - 18)  SpO2: 96% (06-11-23 @ 05:30) (96% - 99%)    PHYSICAL EXAM:  General: NAD, well-groomed, well-developed  Eyes: Pupils equal, round, reactive to light, EOMI, anicteric sclera   ENMT: slightly dry mucous membranes  Neck: supple, no JVD, no tender lymphadenopathy   Chest: Clear to auscultation bilaterally; no rales, rhonchi, or wheezing  Heart: Regular rate and rhythm; normal S1 and S2; no murmurs, rubs, or gallops  Abdomen: Soft, diffusely tender to lightly palpation, nondistended, normoactive bowel sounds   Extremities: Warm, well perfused, no peripheral LE edema bilaterally, 4/5 strength in b/l lower extremities  Nervous System: AOx3, no focal neurological deficits   Psych: Appropriate affect    LABS:      Ca    8.9        10 Pineda 2023 05:43      CAPILLARY BLOOD GLUCOSE      POCT Blood Glucose.: 216 mg/dL (10 Pineda 2023 21:50)  POCT Blood Glucose.: 123 mg/dL (10 Pineda 2023 17:27)  POCT Blood Glucose.: 190 mg/dL (10 Pineda 2023 12:48)  POCT Blood Glucose.: 163 mg/dL (10 Pineda 2023 08:32)    BLOOD CULTURE    RADIOLOGY & ADDITIONAL TESTS:    Imaging Personally Reviewed:  [X ] YES     Consultant(s) Notes Reviewed:  Yes    Care Discussed with Consultants/Other Providers: Yes Patient is a 73y old  Female who presents with a chief complaint of Abdominal pain (10 Pineda 2023 16:15)     INTERVAL HPI/OVERNIGHT EVENTS:  - Overnight, patient had nausea and vomited once.     SUBJECTIVE  - Patient seen and evaluated at bedside. Patient states that she feels a little less tired and weak than days prior. Patient states that overnight she had nausea and vomited once, but no longer has nausea. Patient continues to have diffuse abdominal pain. Patient denies any fever, chills, chest pain, shortness of breath, diarrhea, dysuria, or increased urinary frequency.     MEDICATIONS  (STANDING):  amoxicillin 750 milliGRAM(s) Oral three times a day  aspirin enteric coated 81 milliGRAM(s) Oral daily  atorvastatin 20 milliGRAM(s) Oral at bedtime  dextrose 5%. 1000 milliLiter(s) (100 mL/Hr) IV Continuous <Continuous>  dextrose 5%. 1000 milliLiter(s) (50 mL/Hr) IV Continuous <Continuous>  dextrose 50% Injectable 25 Gram(s) IV Push once  dextrose 50% Injectable 12.5 Gram(s) IV Push once  dextrose 50% Injectable 25 Gram(s) IV Push once  glucagon  Injectable 1 milliGRAM(s) IntraMuscular once  insulin glargine Injectable (LANTUS) 3 Unit(s) SubCutaneous at bedtime  insulin lispro (ADMELOG) corrective regimen sliding scale   SubCutaneous Before meals and at bedtime  insulin lispro Injectable (ADMELOG) 1 Unit(s) SubCutaneous three times a day before meals  levoFLOXacin  Tablet 500 milliGRAM(s) Oral every 24 hours  metoprolol succinate ER 50 milliGRAM(s) Oral daily  pantoprazole    Tablet 40 milliGRAM(s) Oral two times a day  senna 2 Tablet(s) Oral once  sodium chloride 1 Gram(s) Oral three times a day  valsartan 320 milliGRAM(s) Oral daily    MEDICATIONS  (PRN):  acetaminophen     Tablet .. 650 milliGRAM(s) Oral every 6 hours PRN Temp greater or equal to 38C (100.4F), Mild Pain (1 - 3)  dextrose Oral Gel 15 Gram(s) Oral once PRN Blood Glucose LESS THAN 70 milliGRAM(s)/deciliter  melatonin 3 milliGRAM(s) Oral at bedtime PRN Insomnia  ondansetron Injectable 4 milliGRAM(s) IV Push every 8 hours PRN Nausea and/or Vomiting  polyethylene glycol 3350 17 Gram(s) Oral two times a day PRN Constipation        REVIEW OF SYSTEMS: As indicated above; otherwise, negative    VITAL SIGNS:  T(F): 98.3 (06-11-23 @ 05:30), Max: 99.4 (06-10-23 @ 14:19)  HR: 82 (06-11-23 @ 05:30) (78 - 82)  BP: 102/67 (06-11-23 @ 05:30) (102/67 - 120/70)  RR: 18 (06-11-23 @ 05:30) (17 - 18)  SpO2: 96% (06-11-23 @ 05:30) (96% - 99%)    PHYSICAL EXAM:  General: NAD, well-groomed, well-developed  Eyes: Pupils equal, round, reactive to light, EOMI, anicteric sclera   ENMT: slightly dry mucous membranes  Neck: supple, no JVD, no tender lymphadenopathy   Chest: Clear to auscultation bilaterally; no rales, rhonchi, or wheezing  Heart: Regular rate and rhythm; normal S1 and S2; no murmurs, rubs, or gallops  Abdomen: Soft, diffusely tender to lightly palpation, nondistended, normoactive bowel sounds   Extremities: Warm, well perfused, no peripheral LE edema bilaterally, 6/5 strength in b/l lower extremities  Nervous System: AOx3, no focal neurological deficits   Psych: Appropriate affect    LABS:      Ca    8.9        10 Pineda 2023 05:43      CAPILLARY BLOOD GLUCOSE      POCT Blood Glucose.: 216 mg/dL (10 Pineda 2023 21:50)  POCT Blood Glucose.: 123 mg/dL (10 Pineda 2023 17:27)  POCT Blood Glucose.: 190 mg/dL (10 Pineda 2023 12:48)  POCT Blood Glucose.: 163 mg/dL (10 Pineda 2023 08:32)    BLOOD CULTURE    RADIOLOGY & ADDITIONAL TESTS:    Imaging Personally Reviewed:  [X ] YES     Consultant(s) Notes Reviewed:  Yes    Care Discussed with Consultants/Other Providers: Yes

## 2023-06-11 NOTE — PROGRESS NOTE ADULT - PROBLEM SELECTOR PLAN 4
Suspect pseudohyponatremia as serum osmolarity is 276. Possibly related to gamma gap  vs. SIADH 2/2 malignancy  - TSH wnl  - continue to monitor Na level  - nephro consulted Suspect pseudohyponatremia as serum osmolarity is 276. Possibly related to gamma gap  vs. SIADH 2/2 malignancy  - TSH wnl  - continue to monitor Na level  - Per renal, will trial IV lasaix 20mg and increase salt tabs to 2g TID  - will start PO lasix 20mg tomorrow, 6/12  - renal following, recs appreciated

## 2023-06-11 NOTE — PROGRESS NOTE ADULT - ATTENDING COMMENTS
increase Na tabs to 2gms TID   start lasix 20mg PO Daily to increase free water excretion   decrease valsartan dose to allow for diuresis without hypotension

## 2023-06-11 NOTE — PROGRESS NOTE ADULT - SUBJECTIVE AND OBJECTIVE BOX
[facility] DIVISION OF KIDNEY DISEASES AND HYPERTENSION   FOLLOW UP NOTE  --------------------------------------------------------------------------------  HPI: 74 yo F with a PMH of HTN who presented to [facility] due to abdominal pain, found to have new 3.7 x 3.2 cm indeterminant left upper retroperitoneal mass, now s/p EUS. SNa 125 on admission, now 127-128, Nephrology following for hyponatremia.    24 hour events/subjective: Pt. was seen and examined today. She still has abdominal pain. Denied shortness of breath, nausea, vomiting, trouble with urination.     PAST HISTORY  --------------------------------------------------------------------------------  No significant changes to PMH, PSH, FHx, SHx, unless otherwise noted    ALLERGIES & MEDICATIONS  --------------------------------------------------------------------------------  Allergies  No Known Allergies  Intolerances    Standing Inpatient Medications  amoxicillin 750 milliGRAM(s) Oral three times a day  aspirin enteric coated 81 milliGRAM(s) Oral daily  atorvastatin 20 milliGRAM(s) Oral at bedtime  dextrose 5%. 1000 milliLiter(s) IV Continuous <Continuous>  dextrose 5%. 1000 milliLiter(s) IV Continuous <Continuous>  dextrose 50% Injectable 25 Gram(s) IV Push once  dextrose 50% Injectable 12.5 Gram(s) IV Push once  dextrose 50% Injectable 25 Gram(s) IV Push once  glucagon  Injectable 1 milliGRAM(s) IntraMuscular once  insulin glargine Injectable (LANTUS) 3 Unit(s) SubCutaneous at bedtime  insulin lispro (ADMELOG) corrective regimen sliding scale   SubCutaneous Before meals and at bedtime  insulin lispro Injectable (ADMELOG) 1 Unit(s) SubCutaneous three times a day before meals  levoFLOXacin  Tablet 500 milliGRAM(s) Oral every 24 hours  metoprolol succinate ER 50 milliGRAM(s) Oral daily  pantoprazole    Tablet 40 milliGRAM(s) Oral two times a day  senna 2 Tablet(s) Oral once  sodium chloride 1 Gram(s) Oral three times a day  valsartan 320 milliGRAM(s) Oral daily    PRN Inpatient Medications  acetaminophen     Tablet .. 650 milliGRAM(s) Oral every 6 hours PRN  aluminum hydroxide/magnesium hydroxide/simethicone Suspension 30 milliLiter(s) Oral every 4 hours PRN  dextrose Oral Gel 15 Gram(s) Oral once PRN  melatonin 3 milliGRAM(s) Oral at bedtime PRN  ondansetron Injectable 4 milliGRAM(s) IV Push every 8 hours PRN  polyethylene glycol 3350 17 Gram(s) Oral two times a day PRN    REVIEW OF SYSTEMS  --------------------------------------------------------------------------------  All other systems were reviewed and are negative, except as noted.    VITALS/PHYSICAL EXAM  --------------------------------------------------------------------------------  T(C): 36.8 (06-11-23 @ 05:30), Max: 37.4 (06-10-23 @ 14:19)  HR: 82 (06-11-23 @ 05:30) (78 - 82)  BP: 102/67 (06-11-23 @ 05:30) (102/67 - 120/70)  RR: 18 (06-11-23 @ 05:30) (17 - 18)  SpO2: 96% (06-11-23 @ 05:30) (96% - 99%)  Wt(kg): --    Physical Exam:  	Gen: Sitting up in bed and in NAD  	HEENT: Anicteric  	Pulm: CTA B/L  	CV: S1S2+  	Abd: Soft, +BS    	Ext: Trace LE edema B/L  	Neuro: Awake  	Skin: Warm and dry    LABS/STUDIES  --------------------------------------------------------------------------------              11.0   9.65  >-----------<  186      [06-10-23 @ 05:43]              35.2     126  |  93  |  10  ----------------------------<  178      [06-11-23 @ 09:45]  4.0   |  23  |  0.79        Ca     9.1     [06-11-23 @ 09:45]      Mg     1.90     [06-11-23 @ 09:45]      Phos  3.3     [06-11-23 @ 09:45]    Uric acid 3.8      [06-11-23 @ 05:31]    Creatinine Trend:  SCr 0.79 [06-11 @ 09:45]  SCr 0.79 [06-10 @ 05:43]  SCr 0.73 [06-09 @ 03:00]  SCr 0.77 [06-08 @ 15:55]  SCr 0.80 [06-08 @ 07:04]    Urine Protein 51      [06-08-23 @ 12:26]  Urine Sodium 135      [06-08-23 @ 05:09]  Urine Osmolality 484      [06-08-23 @ 05:09]    HCV 0.13, Nonreact      [06-08-23 @ 07:04]    Free Light Chains: kappa 8.85, lambda 6.12, ratio = 1.45      [06-08 @ 10:00]

## 2023-06-12 NOTE — PROGRESS NOTE ADULT - ATTENDING COMMENTS
Hyponatremia    Cont lasix and salt tabs, start HTS as per fellows note above  monitor labs and volume status

## 2023-06-12 NOTE — PROGRESS NOTE ADULT - PROBLEM SELECTOR PLAN 1
Outpt CT A/P reportedly significant for gastric mass;   CT Abdomen and Pelvis with contrast from 6/5 showed 4.1cm x 3.2cm structure posterior to the stomach with crescentic enhancing component. 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 Gurwinder Rosado, does not see patients at [facility], consulted in house GI for possible EUS with biopsy  - S/p EUS with biopsy on 6/9. During endoscopy, an FNA of a cystic lesion adjacent to the spleen was performed  - f/u cytology and path   - continue Levaquin for 2-3 days   - GI following, recs appreciated  - will continue patient's H. Pylori treatment, will be completed on 6/12

## 2023-06-12 NOTE — PROGRESS NOTE ADULT - ATTENDING COMMENTS
73F with PMH of HTN, HLD, DM2, AVB s/p PPM, H pylori on triple therapy here w/ SOB/wt loss/abd pain. Imaging concerning for RP mass. Admitted for further evaluation. GI on board. Pt went for endoscopy and had cystic lesion adjacent to spleen aspirated for testing. Hyponatremia slow to improved, so nephro was consulted. Started on salt tabs/lasix.    Pt seen and evaluated at bedside. Feels much better. States she has more energy. Appetite is better. Still quite constipated. On exam, well appearing, abd soft, NT. Chest clear. +BS. Neuro nonfocal.    -F/u path results.  -Na 127. Continue lasix/salt tab. Inc fluid restrict. HTS as per nephr. Trend BMP  -Enema.  -PT eval - home PT.

## 2023-06-12 NOTE — PROGRESS NOTE ADULT - SUBJECTIVE AND OBJECTIVE BOX
Patient is a 73y old  Female who presents with a chief complaint of Abdominal pain (11 Jun 2023 12:07)     INTERVAL HPI/OVERNIGHT EVENTS:  - No acute events overnight    SUBJECTIVE  - Patient seen and evaluated at bedside  - Patient reports presence of   - Patient reports absence of fevers, chills, HA, lightheadedness, dizziness, nausea, emesis, chest pain, dyspnea, palpitations, abd pain, diarrhea, urinary symptoms, skin color changes or rashes, or LE edema     MEDICATIONS  (STANDING):  amoxicillin 750 milliGRAM(s) Oral three times a day  aspirin enteric coated 81 milliGRAM(s) Oral daily  atorvastatin 20 milliGRAM(s) Oral at bedtime  dextrose 5%. 1000 milliLiter(s) (100 mL/Hr) IV Continuous <Continuous>  dextrose 5%. 1000 milliLiter(s) (50 mL/Hr) IV Continuous <Continuous>  dextrose 50% Injectable 25 Gram(s) IV Push once  dextrose 50% Injectable 12.5 Gram(s) IV Push once  dextrose 50% Injectable 25 Gram(s) IV Push once  furosemide    Tablet 20 milliGRAM(s) Oral daily  glucagon  Injectable 1 milliGRAM(s) IntraMuscular once  insulin glargine Injectable (LANTUS) 3 Unit(s) SubCutaneous at bedtime  insulin lispro (ADMELOG) corrective regimen sliding scale   SubCutaneous Before meals and at bedtime  insulin lispro Injectable (ADMELOG) 1 Unit(s) SubCutaneous three times a day before meals  levoFLOXacin  Tablet 500 milliGRAM(s) Oral every 24 hours  metoprolol succinate ER 50 milliGRAM(s) Oral daily  pantoprazole    Tablet 40 milliGRAM(s) Oral two times a day  senna 2 Tablet(s) Oral once  sodium chloride 2 Gram(s) Oral three times a day  valsartan 180 milliGRAM(s) Oral daily    MEDICATIONS  (PRN):  acetaminophen     Tablet .. 650 milliGRAM(s) Oral every 6 hours PRN Temp greater or equal to 38C (100.4F), Mild Pain (1 - 3)  aluminum hydroxide/magnesium hydroxide/simethicone Suspension 30 milliLiter(s) Oral every 4 hours PRN Dyspepsia  dextrose Oral Gel 15 Gram(s) Oral once PRN Blood Glucose LESS THAN 70 milliGRAM(s)/deciliter  melatonin 3 milliGRAM(s) Oral at bedtime PRN Insomnia  ondansetron Injectable 4 milliGRAM(s) IV Push every 8 hours PRN Nausea and/or Vomiting  polyethylene glycol 3350 17 Gram(s) Oral two times a day PRN Constipation        REVIEW OF SYSTEMS: As indicated above; otherwise, negative    VITAL SIGNS:  T(F): 98.9 (06-12-23 @ 05:17), Max: 98.9 (06-12-23 @ 05:17)  HR: 80 (06-12-23 @ 05:17) (75 - 84)  BP: 119/70 (06-12-23 @ 05:17) (110/67 - 120/70)  RR: 17 (06-12-23 @ 05:17) (16 - 17)  SpO2: 99% (06-12-23 @ 05:17) (98% - 99%)    PHYSICAL EXAM:    General: NAD, well-groomed, well-developed  Eyes: Pupils equal, round, reactive to light, EOMI, anicteric sclera   ENMT: slightly dry mucous membranes  Neck: supple, no JVD, no tender lymphadenopathy   Chest: Clear to auscultation bilaterally; no rales, rhonchi, or wheezing  Heart: Regular rate and rhythm; normal S1 and S2; no murmurs, rubs, or gallops  Abdomen: Soft, diffusely tender to lightly palpation, nondistended, normoactive bowel sounds   Extremities: Warm, well perfused, no peripheral LE edema bilaterally, 5/5 strength in b/l lower extremities  Nervous System: AOx3, no focal neurological deficits   Psych: Appropriate affect      LABS:    11 Jun 2023 09:45    126    |  93     |  10     ----------------------------<  178    4.0     |  23     |  0.79     Ca    9.1        11 Jun 2023 09:45  Phos  3.3       11 Jun 2023 09:45  Mg     1.90      11 Jun 2023 09:45      CAPILLARY BLOOD GLUCOSE      POCT Blood Glucose.: 198 mg/dL (11 Jun 2023 20:57)  POCT Blood Glucose.: 171 mg/dL (11 Jun 2023 17:46)  POCT Blood Glucose.: 208 mg/dL (11 Jun 2023 12:28)  POCT Blood Glucose.: 168 mg/dL (11 Jun 2023 08:25)    BLOOD CULTURE    RADIOLOGY & ADDITIONAL TESTS:    Imaging Personally Reviewed:  [X ] YES     Consultant(s) Notes Reviewed:  Yes    Care Discussed with Consultants/Other Providers: Yes Patient is a 73y old  Female who presents with a chief complaint of Abdominal pain (11 Jun 2023 12:07)     INTERVAL HPI/OVERNIGHT EVENTS:  - No acute events overnight    SUBJECTIVE  - Patient seen and evaluated at bedside. Patient states she feels well and is not in any pain or discomfort. Patient states that she is still constipated and the dulcolax suppository she had yesterday did not result in a BM. Patient denies any fever, chills, chest pain, shortness of breath, nausea, vomiting, abdominal pain, or diarrhea.     MEDICATIONS  (STANDING):  amoxicillin 750 milliGRAM(s) Oral three times a day  aspirin enteric coated 81 milliGRAM(s) Oral daily  atorvastatin 20 milliGRAM(s) Oral at bedtime  dextrose 5%. 1000 milliLiter(s) (100 mL/Hr) IV Continuous <Continuous>  dextrose 5%. 1000 milliLiter(s) (50 mL/Hr) IV Continuous <Continuous>  dextrose 50% Injectable 25 Gram(s) IV Push once  dextrose 50% Injectable 12.5 Gram(s) IV Push once  dextrose 50% Injectable 25 Gram(s) IV Push once  furosemide    Tablet 20 milliGRAM(s) Oral daily  glucagon  Injectable 1 milliGRAM(s) IntraMuscular once  insulin glargine Injectable (LANTUS) 3 Unit(s) SubCutaneous at bedtime  insulin lispro (ADMELOG) corrective regimen sliding scale   SubCutaneous Before meals and at bedtime  insulin lispro Injectable (ADMELOG) 1 Unit(s) SubCutaneous three times a day before meals  levoFLOXacin  Tablet 500 milliGRAM(s) Oral every 24 hours  metoprolol succinate ER 50 milliGRAM(s) Oral daily  pantoprazole    Tablet 40 milliGRAM(s) Oral two times a day  senna 2 Tablet(s) Oral once  sodium chloride 2 Gram(s) Oral three times a day  valsartan 180 milliGRAM(s) Oral daily    MEDICATIONS  (PRN):  acetaminophen     Tablet .. 650 milliGRAM(s) Oral every 6 hours PRN Temp greater or equal to 38C (100.4F), Mild Pain (1 - 3)  aluminum hydroxide/magnesium hydroxide/simethicone Suspension 30 milliLiter(s) Oral every 4 hours PRN Dyspepsia  dextrose Oral Gel 15 Gram(s) Oral once PRN Blood Glucose LESS THAN 70 milliGRAM(s)/deciliter  melatonin 3 milliGRAM(s) Oral at bedtime PRN Insomnia  ondansetron Injectable 4 milliGRAM(s) IV Push every 8 hours PRN Nausea and/or Vomiting  polyethylene glycol 3350 17 Gram(s) Oral two times a day PRN Constipation        REVIEW OF SYSTEMS: As indicated above; otherwise, negative    VITAL SIGNS:  T(F): 98.9 (06-12-23 @ 05:17), Max: 98.9 (06-12-23 @ 05:17)  HR: 80 (06-12-23 @ 05:17) (75 - 84)  BP: 119/70 (06-12-23 @ 05:17) (110/67 - 120/70)  RR: 17 (06-12-23 @ 05:17) (16 - 17)  SpO2: 99% (06-12-23 @ 05:17) (98% - 99%)    PHYSICAL EXAM:    General: NAD, well-groomed, well-developed  Eyes: Pupils equal, round, reactive to light, EOMI, anicteric sclera   ENMT: slightly dry mucous membranes  Neck: supple, no JVD, no tender lymphadenopathy   Chest: Clear to auscultation bilaterally; no rales, rhonchi, or wheezing  Heart: Regular rate and rhythm; normal S1 and S2; no murmurs, rubs, or gallops  Abdomen: Soft, diffusely tender to lightly palpation, nondistended, normoactive bowel sounds   Extremities: Warm, well perfused, no peripheral LE edema bilaterally, 5/5 strength in b/l lower extremities  Nervous System: AOx3, no focal neurological deficits   Psych: Appropriate affect      LABS:    11 Jun 2023 09:45    126    |  93     |  10     ----------------------------<  178    4.0     |  23     |  0.79     Ca    9.1        11 Jun 2023 09:45  Phos  3.3       11 Jun 2023 09:45  Mg     1.90      11 Jun 2023 09:45      CAPILLARY BLOOD GLUCOSE      POCT Blood Glucose.: 198 mg/dL (11 Jun 2023 20:57)  POCT Blood Glucose.: 171 mg/dL (11 Jun 2023 17:46)  POCT Blood Glucose.: 208 mg/dL (11 Jun 2023 12:28)  POCT Blood Glucose.: 168 mg/dL (11 Jun 2023 08:25)    BLOOD CULTURE    RADIOLOGY & ADDITIONAL TESTS:    Imaging Personally Reviewed:  [X ] YES     Consultant(s) Notes Reviewed:  Yes    Care Discussed with Consultants/Other Providers: Yes

## 2023-06-12 NOTE — PROGRESS NOTE ADULT - PROBLEM SELECTOR PLAN 1
Pt with hyponatremia in the setting of possible malignancy, 2L free water intake, decreased PO intake and has some B/L LE edema on exam. SNa on admission of 125, and remains low/stable at 127 today. Urine osm: 484, urine Na: 135. Pt. is currently receiving salt tabs 2 gm tid, and Lasix 20 mg PO qd. 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 Recommend 1.5% HTS at 50 cc/hr for 6 hours total. Recheck BMP upon completion of infusion. Recommend to continue with salt tabs and PO lasix at current dose. Continue with fluid restriction to <1L/day. Optimize glucose control. Monitor SNa.    If you have any questions, please feel free to contact me  Estefani Gonzalez  Nephrology Fellow  [phone] / [employer] Teams(Preferred)  (After 5pm or on weekends please page the on-call fellow)

## 2023-06-12 NOTE — PROGRESS NOTE ADULT - PROBLEM SELECTOR PLAN 4
Suspect pseudohyponatremia as serum osmolarity is 276. Possibly related to gamma gap  vs. SIADH 2/2 malignancy  - TSH wnl  - continue to monitor Na level  - Per renal, will trial IV lasaix 20mg and increase salt tabs to 2g TID  - will start PO lasix 20mg tomorrow, 6/12  - renal following, recs appreciated Suspect pseudohyponatremia as serum osmolarity is 276. Possibly related to gamma gap  vs. SIADH 2/2 malignancy  - TSH wnl  - continue to monitor Na level  - Per renal, continue PO lasix 20mg and salt tabs to 2g TID  - will trial 1.5% sodium chloride at 50cc/hr for 6hrs, f/u evening BMP  - renal following, recs appreciated

## 2023-06-12 NOTE — PROGRESS NOTE ADULT - SUBJECTIVE AND OBJECTIVE BOX
[facility] Division of Kidney Diseases & Hypertension  FOLLOW UP NOTE  [phone]--------------------------------------------------------------------------------  HPI: 72 yo F with a PMH of HTN who presented to [facility] due to abdominal pain, found to have new 3.7 x 3.2 cm indeterminant left upper retroperitoneal mass, now s/p EUS. SNa 125 on admission, now 127. Nephrology following for hyponatremia. Pt. is receiving PO salt tabs and PO lasix.    24 hour events/subjective: Pt. was seen and examined today. She still has abdominal pain. Denied shortness of breath, nausea, vomiting, trouble with urination.         PAST HISTORY  --------------------------------------------------------------------------------  No significant changes to PMH, PSH, FHx, SHx, unless otherwise noted    ALLERGIES & MEDICATIONS  --------------------------------------------------------------------------------  Allergies    No Known Allergies    Intolerances      Standing Inpatient Medications  amoxicillin 750 milliGRAM(s) Oral three times a day  aspirin enteric coated 81 milliGRAM(s) Oral daily  atorvastatin 20 milliGRAM(s) Oral at bedtime  dextrose 5%. 1000 milliLiter(s) IV Continuous <Continuous>  dextrose 5%. 1000 milliLiter(s) IV Continuous <Continuous>  dextrose 50% Injectable 25 Gram(s) IV Push once  dextrose 50% Injectable 12.5 Gram(s) IV Push once  dextrose 50% Injectable 25 Gram(s) IV Push once  furosemide    Tablet 20 milliGRAM(s) Oral daily  glucagon  Injectable 1 milliGRAM(s) IntraMuscular once  insulin glargine Injectable (LANTUS) 3 Unit(s) SubCutaneous at bedtime  insulin lispro (ADMELOG) corrective regimen sliding scale   SubCutaneous Before meals and at bedtime  insulin lispro Injectable (ADMELOG) 1 Unit(s) SubCutaneous three times a day before meals  levoFLOXacin  Tablet 500 milliGRAM(s) Oral every 24 hours  metoprolol succinate ER 50 milliGRAM(s) Oral daily  pantoprazole    Tablet 40 milliGRAM(s) Oral two times a day  senna 2 Tablet(s) Oral once  sodium chloride 2 Gram(s) Oral three times a day  sodium chloride 1.5%. 500 milliLiter(s) IV Continuous <Continuous>  valsartan 180 milliGRAM(s) Oral daily    PRN Inpatient Medications  acetaminophen     Tablet .. 650 milliGRAM(s) Oral every 6 hours PRN  aluminum hydroxide/magnesium hydroxide/simethicone Suspension 30 milliLiter(s) Oral every 4 hours PRN  dextrose Oral Gel 15 Gram(s) Oral once PRN  melatonin 3 milliGRAM(s) Oral at bedtime PRN  ondansetron Injectable 4 milliGRAM(s) IV Push every 8 hours PRN  polyethylene glycol 3350 17 Gram(s) Oral two times a day PRN      REVIEW OF SYSTEMS  --------------------------------------------------------------------------------  See HPI    VITALS/PHYSICAL EXAM  --------------------------------------------------------------------------------  T(C): 37 (06-12-23 @ 13:21), Max: 37.2 (06-12-23 @ 05:17)  HR: 76 (06-12-23 @ 13:21) (76 - 84)  BP: 109/61 (06-12-23 @ 13:21) (109/61 - 119/70)  RR: 17 (06-12-23 @ 13:21) (17 - 17)  SpO2: 99% (06-12-23 @ 13:21) (99% - 99%)  Wt(kg): --    Physical Exam:  Gen: Sitting up in bed and in NAD  HEENT: Anicteric  Pulm: CTA B/L  CV: S1S2+  Abd: Soft, +BS    Ext: Trace LE edema B/L  Neuro: Awake  Skin: Warm and dry    LABS/STUDIES  --------------------------------------------------------------------------------    127  |  94  |  9   ----------------------------<  154      [06-12-23 @ 06:30]  4.0   |  23  |  0.71        Ca     8.9     [06-12-23 @ 06:30]      Mg     1.80     [06-12-23 @ 06:30]      Phos  3.3     [06-12-23 @ 06:30]      Uric acid 3.8      [06-11-23 @ 05:31]    Creatinine Trend:  SCr 0.71 [06-12 @ 06:30]  SCr 0.79 [06-11 @ 09:45]  SCr 0.79 [06-10 @ 05:43]  SCr 0.73 [06-09 @ 03:00]  SCr 0.77 [06-08 @ 15:55]      Urine Protein 51      [06-08-23 @ 12:26]  Urine Sodium 135      [06-08-23 @ 05:09]  Urine Osmolality 484      [06-08-23 @ 05:09]    TSH 1.02      [06-08-23 @ 07:04]  Lipid: chol 75, , HDL 11, LDL --      [06-08-23 @ 07:04]    HCV 0.13, Nonreact      [06-08-23 @ 07:04]    Free Light Chains: kappa 8.85, lambda 6.12, ratio = 1.45      [06-08 @ 10:00]  SPEP Interpretation: Decreased serum Albumin and increased Polyclonal Gamma fraction  consistent with chronic inflammatory condition.  ANUJA Montiel M.D.      [06-08-23 @ 10:00]  UPEP Interpretation: Normal pattern of urine protein electrophoresis.   ANUJA Montiel M.D.      [06-08-23 @ 12:26]

## 2023-06-13 NOTE — PROGRESS NOTE ADULT - PROBLEM SELECTOR PLAN 5
Blood glucose level on admission 189  - holding oral home medications  - will continue with DREW  - A1c was 6.1 DVT ppx: lovenox   Diet: Easy to chew, consistent carbs,   Dispo: home with outpatient PT

## 2023-06-13 NOTE — PROGRESS NOTE ADULT - SUBJECTIVE AND OBJECTIVE BOX
Patient is a 73y old  Female who presents with a chief complaint of Abdominal pain (12 Jun 2023 13:22)     INTERVAL HPI/OVERNIGHT EVENTS:  - No acute events overnight    SUBJECTIVE  - Patient seen and evaluated at bedside  - Patient reports presence of   - Patient reports absence of fevers, chills, HA, lightheadedness, dizziness, nausea, emesis, chest pain, dyspnea, palpitations, abd pain, diarrhea, urinary symptoms, skin color changes or rashes, or LE edema     MEDICATIONS  (STANDING):  amoxicillin 750 milliGRAM(s) Oral three times a day  aspirin enteric coated 81 milliGRAM(s) Oral daily  atorvastatin 20 milliGRAM(s) Oral at bedtime  dextrose 5%. 1000 milliLiter(s) (50 mL/Hr) IV Continuous <Continuous>  dextrose 5%. 1000 milliLiter(s) (100 mL/Hr) IV Continuous <Continuous>  dextrose 50% Injectable 25 Gram(s) IV Push once  dextrose 50% Injectable 12.5 Gram(s) IV Push once  dextrose 50% Injectable 25 Gram(s) IV Push once  furosemide    Tablet 20 milliGRAM(s) Oral daily  glucagon  Injectable 1 milliGRAM(s) IntraMuscular once  insulin glargine Injectable (LANTUS) 3 Unit(s) SubCutaneous at bedtime  insulin lispro (ADMELOG) corrective regimen sliding scale   SubCutaneous Before meals and at bedtime  insulin lispro Injectable (ADMELOG) 1 Unit(s) SubCutaneous three times a day before meals  metoprolol succinate ER 50 milliGRAM(s) Oral daily  pantoprazole    Tablet 40 milliGRAM(s) Oral two times a day  senna 2 Tablet(s) Oral once  sodium chloride 2 Gram(s) Oral three times a day  sodium chloride 1.5%. 500 milliLiter(s) (50 mL/Hr) IV Continuous <Continuous>  valsartan 180 milliGRAM(s) Oral daily    MEDICATIONS  (PRN):  acetaminophen     Tablet .. 650 milliGRAM(s) Oral every 6 hours PRN Temp greater or equal to 38C (100.4F), Mild Pain (1 - 3)  aluminum hydroxide/magnesium hydroxide/simethicone Suspension 30 milliLiter(s) Oral every 4 hours PRN Dyspepsia  dextrose Oral Gel 15 Gram(s) Oral once PRN Blood Glucose LESS THAN 70 milliGRAM(s)/deciliter  melatonin 3 milliGRAM(s) Oral at bedtime PRN Insomnia  ondansetron Injectable 4 milliGRAM(s) IV Push every 8 hours PRN Nausea and/or Vomiting  polyethylene glycol 3350 17 Gram(s) Oral two times a day PRN Constipation        REVIEW OF SYSTEMS: As indicated above; otherwise, negative    VITAL SIGNS:  T(F): 100 (06-13-23 @ 05:30), Max: 100 (06-13-23 @ 05:30)  HR: 89 (06-13-23 @ 05:30) (76 - 89)  BP: 121/72 (06-13-23 @ 05:30) (109/61 - 121/72)  RR: 17 (06-13-23 @ 05:30) (17 - 17)  SpO2: 100% (06-13-23 @ 05:30) (98% - 100%)    PHYSICAL EXAM:    General: NAD, well-groomed, well-developed  Eyes: Pupils equal, round, reactive to light, EOMI, anicteric sclera   ENMT: slightly dry mucous membranes  Neck: supple, no JVD, no tender lymphadenopathy   Chest: Clear to auscultation bilaterally; no rales, rhonchi, or wheezing  Heart: Regular rate and rhythm; normal S1 and S2; no murmurs, rubs, or gallops  Abdomen: Soft, diffusely tender to lightly palpation, nondistended, normoactive bowel sounds   Extremities: Warm, well perfused, no peripheral LE edema bilaterally, 5/5 strength in b/l lower extremities  Nervous System: AOx3, no focal neurological deficits   Psych: Appropriate affect      LABS:    12 Jun 2023 23:55    127    |  96     |  12     ----------------------------<  152    4.0     |  21     |  0.75     Ca    8.8        12 Jun 2023 23:55  Phos  3.6       12 Jun 2023 23:55  Mg     1.80      12 Jun 2023 23:55      CAPILLARY BLOOD GLUCOSE      POCT Blood Glucose.: 168 mg/dL (12 Jun 2023 21:57)  POCT Blood Glucose.: 125 mg/dL (12 Jun 2023 17:49)  POCT Blood Glucose.: 205 mg/dL (12 Jun 2023 12:32)  POCT Blood Glucose.: 163 mg/dL (12 Jun 2023 08:25)    BLOOD CULTURE    RADIOLOGY & ADDITIONAL TESTS:    Imaging Personally Reviewed:  [X ] YES     Consultant(s) Notes Reviewed:  Yes    Care Discussed with Consultants/Other Providers: Yes Patient is a 73y old  Female who presents with a chief complaint of Abdominal pain (12 Jun 2023 13:22)     INTERVAL HPI/OVERNIGHT EVENTS:  - No acute events overnight    SUBJECTIVE  - Patient seen and evaluated at bedside. Patient states she feels well and is not in any pain or discomfort. Patient states she had a BM following the enema last night. Patient denies any fever, chills, chest pain, shortness of breath, nausea, vomiting, abdominal pain, constipation, or diarrhea.     MEDICATIONS  (STANDING):  amoxicillin 750 milliGRAM(s) Oral three times a day  aspirin enteric coated 81 milliGRAM(s) Oral daily  atorvastatin 20 milliGRAM(s) Oral at bedtime  dextrose 5%. 1000 milliLiter(s) (50 mL/Hr) IV Continuous <Continuous>  dextrose 5%. 1000 milliLiter(s) (100 mL/Hr) IV Continuous <Continuous>  dextrose 50% Injectable 25 Gram(s) IV Push once  dextrose 50% Injectable 12.5 Gram(s) IV Push once  dextrose 50% Injectable 25 Gram(s) IV Push once  furosemide    Tablet 20 milliGRAM(s) Oral daily  glucagon  Injectable 1 milliGRAM(s) IntraMuscular once  insulin glargine Injectable (LANTUS) 3 Unit(s) SubCutaneous at bedtime  insulin lispro (ADMELOG) corrective regimen sliding scale   SubCutaneous Before meals and at bedtime  insulin lispro Injectable (ADMELOG) 1 Unit(s) SubCutaneous three times a day before meals  metoprolol succinate ER 50 milliGRAM(s) Oral daily  pantoprazole    Tablet 40 milliGRAM(s) Oral two times a day  senna 2 Tablet(s) Oral once  sodium chloride 2 Gram(s) Oral three times a day  sodium chloride 1.5%. 500 milliLiter(s) (50 mL/Hr) IV Continuous <Continuous>  valsartan 180 milliGRAM(s) Oral daily    MEDICATIONS  (PRN):  acetaminophen     Tablet .. 650 milliGRAM(s) Oral every 6 hours PRN Temp greater or equal to 38C (100.4F), Mild Pain (1 - 3)  aluminum hydroxide/magnesium hydroxide/simethicone Suspension 30 milliLiter(s) Oral every 4 hours PRN Dyspepsia  dextrose Oral Gel 15 Gram(s) Oral once PRN Blood Glucose LESS THAN 70 milliGRAM(s)/deciliter  melatonin 3 milliGRAM(s) Oral at bedtime PRN Insomnia  ondansetron Injectable 4 milliGRAM(s) IV Push every 8 hours PRN Nausea and/or Vomiting  polyethylene glycol 3350 17 Gram(s) Oral two times a day PRN Constipation        REVIEW OF SYSTEMS: As indicated above; otherwise, negative    VITAL SIGNS:  T(F): 100 (06-13-23 @ 05:30), Max: 100 (06-13-23 @ 05:30)  HR: 89 (06-13-23 @ 05:30) (76 - 89)  BP: 121/72 (06-13-23 @ 05:30) (109/61 - 121/72)  RR: 17 (06-13-23 @ 05:30) (17 - 17)  SpO2: 100% (06-13-23 @ 05:30) (98% - 100%)    PHYSICAL EXAM:    General: NAD, well-groomed, well-developed  Eyes: Pupils equal, round, reactive to light, EOMI, anicteric sclera   ENMT: slightly dry mucous membranes  Neck: supple, no JVD, no tender lymphadenopathy   Chest: Clear to auscultation bilaterally; no rales, rhonchi, or wheezing  Heart: Regular rate and rhythm; normal S1 and S2; no murmurs, rubs, or gallops  Abdomen: Soft, non-tender. non-distended, normoactive bowel sounds   Extremities: Warm, well perfused, no peripheral LE edema bilaterally, 5/5 strength in b/l lower extremities  Nervous System: AOx3, no focal neurological deficits   Psych: Appropriate affect      LABS:    12 Jun 2023 23:55    127    |  96     |  12     ----------------------------<  152    4.0     |  21     |  0.75     Ca    8.8        12 Jun 2023 23:55  Phos  3.6       12 Jun 2023 23:55  Mg     1.80      12 Jun 2023 23:55      CAPILLARY BLOOD GLUCOSE      POCT Blood Glucose.: 168 mg/dL (12 Jun 2023 21:57)  POCT Blood Glucose.: 125 mg/dL (12 Jun 2023 17:49)  POCT Blood Glucose.: 205 mg/dL (12 Jun 2023 12:32)  POCT Blood Glucose.: 163 mg/dL (12 Jun 2023 08:25)    BLOOD CULTURE    RADIOLOGY & ADDITIONAL TESTS:    Imaging Personally Reviewed:  [X ] YES     Consultant(s) Notes Reviewed:  Yes    Care Discussed with Consultants/Other Providers: Yes

## 2023-06-13 NOTE — PROGRESS NOTE ADULT - PROBLEM SELECTOR PLAN 3
CTA chest: 3.7 x 3.2 cm indeterminant left upper retroperitoneal mass. Further evaluation can be performed with MRI.  Possible metastatic disease given reportedly outpt CT A/P significant for mass.   - will hold off on Oncology consult, pending path results Outpt CT A/P reportedly significant for gastric mass;   CT Abdomen and Pelvis with contrast from 6/5 showed 4.1cm x 3.2cm structure posterior to the stomach with crescentic enhancing component. 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 Gurwinder Rosado, does not see patients at [facility], consulted in house GI for possible EUS with biopsy  - S/p EUS with biopsy on 6/9. During endoscopy, an FNA of a cystic lesion adjacent to the spleen was performed  - f/u cytology and path   - GI following, recs appreciated  - s/p H. Pylori treatment, course completed on 6/12  - RESOLVED

## 2023-06-13 NOTE — PROGRESS NOTE ADULT - ASSESSMENT
72yo Female with MHx of HTN, HLD, DM Type 2, AVB s/p PPM , H. Pylori on triple Abx therapy a/w SOB of unclear etiology, and epigastric pain; Found to have new 3.7 x 3.2 cm indeterminant left upper retroperitoneal mass. S/p  EUS with biopsy with GI

## 2023-06-13 NOTE — PROGRESS NOTE ADULT - PROBLEM SELECTOR PLAN 4
Suspect pseudohyponatremia as serum osmolarity is 276. Possibly related to gamma gap  vs. SIADH 2/2 malignancy  - TSH wnl  - continue to monitor Na level  - Per renal, continue PO lasix 20mg and salt tabs to 2g TID  - will trial 1.5% sodium chloride at 50cc/hr for 6hrs, f/u evening BMP  - renal following, recs appreciated Blood glucose level on admission 189  - holding oral home medications  - will continue with DREW  - A1c was 6.1

## 2023-06-13 NOTE — PROGRESS NOTE ADULT - ATTENDING COMMENTS
73F with PMH of HTN, HLD, DM2, AVB s/p PPM, H pylori on triple therapy here w/ SOB/wt loss/abd pain. Imaging concerning for RP mass. Admitted for further evaluation. GI on board. Pt went for endoscopy and had cystic lesion adjacent to spleen aspirated for testing. Hyponatremia slow to improved, so nephro was consulted. Started on salt tabs/lasix.    Pt seen and evaluated at bedside. Feels much better. States she has more energy. Appetite is better. Still quite constipated. On exam, well appearing, abd soft, NT. Chest clear. +BS. Neuro nonfocal.    #Hyponatremia 2' SIADH  #RP mass  #Gastritis  #HTN  #DM2  #HLD    -F/u path results.  -No sig response of Na despite HTS. Giving 3% HTS today. Labs post infusion. D/w nephro attending.  -Cont lasix/salt tabs.  -PT eval - home PT.    Dispo pending further Na improvement. 73F with PMH of HTN, HLD, DM2, AVB s/p PPM, H pylori on triple therapy here w/ SOB/wt loss/abd pain. Imaging concerning for RP mass. Admitted for further evaluation. GI on board. Pt went for endoscopy and had cystic lesion adjacent to spleen aspirated for testing. Hyponatremia slow to improved, so nephro was consulted. Started on salt tabs/lasix. HTS given w/o sig response. Pt to refuse 3% HTS and will re-eval. Remains hospitalized pending further improvement of Na.    Pt seen and evaluated at bedside. Feels much better. States she has more energy. Appetite is better. Still quite constipated. On exam, well appearing, abd soft, NT. Chest clear. +BS. Neuro nonfocal.    #Hyponatremia 2' SIADH  #RP mass  #Gastritis  #HTN  #DM2  #HLD    -F/u path results.  -No sig response of Na despite HTS. Giving 3% HTS today. Labs post infusion. D/w nephro attending.  -Cont lasix/salt tabs.  -PT eval - home PT.    Dispo pending further Na improvement.

## 2023-06-13 NOTE — PROGRESS NOTE ADULT - SUBJECTIVE AND OBJECTIVE BOX
[facility] Division of Kidney Diseases & Hypertension  FOLLOW UP NOTE  [phone]--------------------------------------------------------------------------------    HPI: 72 yo F with a PMH of HTN who presented to [facility] due to abdominal pain, found to have new 3.7 x 3.2 cm indeterminant left upper retroperitoneal mass, now s/p EUS. SNa 125 on admission, now 127. Nephrology following for hyponatremia. Pt. is receiving PO salt tabs and PO lasix.    24 hour events/subjective: Pt. received 1.5% hypertonic saline. SNa remains low/stable at 128 today. Pt. was seen and examined today. She still has abdominal pain. Denied shortness of breath, nausea, vomiting, trouble with urination.       PAST HISTORY  --------------------------------------------------------------------------------  No significant changes to PMH, PSH, FHx, SHx, unless otherwise noted    ALLERGIES & MEDICATIONS  --------------------------------------------------------------------------------  Allergies    No Known Allergies    Intolerances      Standing Inpatient Medications  amoxicillin 750 milliGRAM(s) Oral three times a day  aspirin enteric coated 81 milliGRAM(s) Oral daily  atorvastatin 20 milliGRAM(s) Oral at bedtime  dextrose 5%. 1000 milliLiter(s) IV Continuous <Continuous>  dextrose 5%. 1000 milliLiter(s) IV Continuous <Continuous>  dextrose 50% Injectable 25 Gram(s) IV Push once  dextrose 50% Injectable 12.5 Gram(s) IV Push once  dextrose 50% Injectable 25 Gram(s) IV Push once  furosemide    Tablet 20 milliGRAM(s) Oral daily  glucagon  Injectable 1 milliGRAM(s) IntraMuscular once  insulin glargine Injectable (LANTUS) 3 Unit(s) SubCutaneous at bedtime  insulin lispro (ADMELOG) corrective regimen sliding scale   SubCutaneous Before meals and at bedtime  insulin lispro Injectable (ADMELOG) 1 Unit(s) SubCutaneous three times a day before meals  metoprolol succinate ER 50 milliGRAM(s) Oral daily  pantoprazole    Tablet 40 milliGRAM(s) Oral two times a day  senna 2 Tablet(s) Oral once  sodium chloride 2 Gram(s) Oral three times a day  sodium chloride 1.5%. 500 milliLiter(s) IV Continuous <Continuous>  sodium chloride 3%. 500 milliLiter(s) IV Continuous <Continuous>  valsartan 180 milliGRAM(s) Oral daily    PRN Inpatient Medications  acetaminophen     Tablet .. 650 milliGRAM(s) Oral every 6 hours PRN  aluminum hydroxide/magnesium hydroxide/simethicone Suspension 30 milliLiter(s) Oral every 4 hours PRN  dextrose Oral Gel 15 Gram(s) Oral once PRN  melatonin 3 milliGRAM(s) Oral at bedtime PRN  ondansetron Injectable 4 milliGRAM(s) IV Push every 8 hours PRN  polyethylene glycol 3350 17 Gram(s) Oral two times a day PRN      REVIEW OF SYSTEMS  --------------------------------------------------------------------------------  See HPI    VITALS/PHYSICAL EXAM  --------------------------------------------------------------------------------  T(C): 37.8 (06-13-23 @ 05:30), Max: 37.8 (06-13-23 @ 05:30)  HR: 89 (06-13-23 @ 05:30) (76 - 89)  BP: 121/72 (06-13-23 @ 05:30) (109/61 - 121/72)  RR: 17 (06-13-23 @ 05:30) (17 - 17)  SpO2: 100% (06-13-23 @ 05:30) (98% - 100%)  Wt(kg): --    Physical Exam:  Gen: Sitting up in bed and in NAD  HEENT: Anicteric  Pulm: CTA B/L  CV: S1S2+  Abd: Soft, +BS    Ext: Trace LE edema B/L (improved)  Neuro: Awake and alert  Skin: Warm and dry    LABS/STUDIES  --------------------------------------------------------------------------------    128  |  94  |  10  ----------------------------<  159      [06-13-23 @ 06:15]  3.9   |  23  |  0.78        Ca     9.0     [06-13-23 @ 06:15]      Mg     1.80     [06-13-23 @ 06:15]      Phos  3.4     [06-13-23 @ 06:15]    Serum Osmolality 290      [06-12-23 @ 14:43]    Creatinine Trend:  SCr 0.78 [06-13 @ 06:15]  SCr 0.75 [06-12 @ 23:55]  SCr 0.71 [06-12 @ 06:30]  SCr 0.79 [06-11 @ 09:45]  SCr 0.79 [06-10 @ 05:43]      Urine Protein 51      [06-08-23 @ 12:26]  Urine Sodium 135      [06-08-23 @ 05:09]  Urine Osmolality 484      [06-08-23 @ 05:09]    TSH 1.02      [06-08-23 @ 07:04]  Lipid: chol 75, , HDL 11, LDL --      [06-08-23 @ 07:04]    HCV 0.13, Nonreact      [06-08-23 @ 07:04]    Free Light Chains: kappa 8.85, lambda 6.12, ratio = 1.45      [06-08 @ 10:00]  SPEP Interpretation: Decreased serum Albumin and increased Polyclonal Gamma fraction  consistent with chronic inflammatory condition.  ANUJA Montiel M.D.      [06-08-23 @ 10:00]  Immunofixation Urine: DUPLICATE      [06-08-23 @ 12:26]  UPEP Interpretation: Normal pattern of urine protein electrophoresis.   ANUJA Montiel M.D.      [06-08-23 @ 12:26]

## 2023-06-13 NOTE — PROGRESS NOTE ADULT - PROBLEM SELECTOR PLAN 1
Outpt CT A/P reportedly significant for gastric mass;   CT Abdomen and Pelvis with contrast from 6/5 showed 4.1cm x 3.2cm structure posterior to the stomach with crescentic enhancing component. 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 Gurwinder Rosado, does not see patients at [facility], consulted in house GI for possible EUS with biopsy  - S/p EUS with biopsy on 6/9. During endoscopy, an FNA of a cystic lesion adjacent to the spleen was performed  - f/u cytology and path   - GI following, recs appreciated  - s/p H. Pylori treatment, course completed on 6/12 CTA chest: 3.7 x 3.2 cm indeterminant left upper retroperitoneal mass. Further evaluation can be performed with MRI.  Possible metastatic disease given reportedly outpt CT A/P significant for mass.   - s/p EUS with FNA of cystic lesion on 6/9  - f/u cytology and path report   - will hold off on Oncology consult, pending path results

## 2023-06-13 NOTE — PROGRESS NOTE ADULT - PROBLEM SELECTOR PLAN 1
Pt with hyponatremia in the setting of possible malignancy, 2L free water intake, decreased PO intake and has some B/L LE edema on exam. SNa on admission of 125. Pt. received 1.5% hypertonic saline x2. SNa remains low/stable at 128 this morning. Urine osm: 484, urine Na: 135. Pt. is currently receiving salt tabs 2 gm tid, and Lasix 20 mg PO qd. 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 Recommend 3% HTS at 30 cc/hr for 3 hours total. Recheck BMP upon completion of infusion. Recommend to continue with salt tabs and PO lasix at current dose. Continue with fluid restriction to <1L/day. Optimize glucose control. Monitor SNa.    If you have any questions, please feel free to contact me  Estefani Gonzalez  Nephrology Fellow  [phone] / Microsoft Teams(Preferred)  (After 5pm or on weekends please page the on-call fellow)

## 2023-06-13 NOTE — PROGRESS NOTE ADULT - PROBLEM SELECTOR PLAN 2
Unclear etiology; Not wheezing on exam; Possible HFpEF, mild;   CXR: clear lungs, no PTX or effusions  CTA chest: no PE  Recent TTE, 29/3/23: Mild diastolic dysfunction (Stage I) and EF=62%  Elevated Pro-BNP, 486 (maybe normal for age), Trop 13 x2  EKG: LBBB, not acute   c/w BB, ASA, Statin   Interrogation of pacemaker by EP on 6/8, showed V paced 99% of the time with normal sensing and pacing with excellent threshold capture.   a1c was 6.1  TSH wnl  cardiology consulted for pre-op evaluation, will f/u  Shortness of breath has Resolved Suspect pseudohyponatremia as serum osmolarity is 276. Possibly related to gamma gap  vs. SIADH 2/2 malignancy  - TSH wnl  - continue to monitor Na level  - Per renal, continue PO lasix 20mg and salt tabs to 2g TID  - trial of 1.5% hypertonic saline, did not improve sodium.   - Per renal, will attempt trial of 3% hypertonic saline   - renal following, recs appreciated

## 2023-06-14 NOTE — PROGRESS NOTE ADULT - PROBLEM SELECTOR PLAN 1
CTA chest: 3.7 x 3.2 cm indeterminant left upper retroperitoneal mass. Further evaluation can be performed with MRI.  Possible metastatic disease given reportedly outpt CT A/P significant for mass.   - s/p EUS with FNA of cystic lesion on 6/9  - f/u cytology and path report   - will hold off on Oncology consult, pending path results

## 2023-06-14 NOTE — PROGRESS NOTE ADULT - PROBLEM SELECTOR PLAN 5
DVT ppx: lovenox   Diet: Easy to chew, consistent carbs,   Dispo: home with outpatient PT DVT ppx: lovenox   Diet: Easy to chew, consistent carbs,   Dispo: being discharged to home today, 6/14

## 2023-06-14 NOTE — PROGRESS NOTE ADULT - PROBLEM SELECTOR PLAN 3
Outpt CT A/P reportedly significant for gastric mass;   CT Abdomen and Pelvis with contrast from 6/5 showed 4.1cm x 3.2cm structure posterior to the stomach with crescentic enhancing component. 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 Gurwinder Rosado, does not see patients at [facility], consulted in house GI for possible EUS with biopsy  - S/p EUS with biopsy on 6/9. During endoscopy, an FNA of a cystic lesion adjacent to the spleen was performed  - f/u cytology and path   - GI following, recs appreciated  - s/p H. Pylori treatment, course completed on 6/12  - RESOLVED

## 2023-06-14 NOTE — PROGRESS NOTE ADULT - ATTENDING COMMENTS
73F with PMH of HTN, HLD, DM2, AVB s/p PPM, H pylori on triple therapy here w/ SOB/wt loss/abd pain. Imaging concerning for RP mass. Admitted for further evaluation. GI on board. Pt went for endoscopy and had cystic lesion adjacent to spleen aspirated for testing. Hyponatremia slow to improved, so nephro was consulted. Started on salt tabs/lasix. HTS given w/o sig response. Pt later received 3% HTS w/ improvement of Na. Med rohan for DC home w/ close OP follow up.     Pt seen and evaluated at bedside. Doing well. No new complaints. Tolerating PO. No sig pain. No  labs today.    #Hyponatremia 2' SIADH  #RP mass  #Gastritis  #HTN  #DM2  #HLD    -F/u path results. Pt's cell - [phone], [employer] (dtr) - [phone] - if any updates, we will follow.  -Cont salt tab, lasix. OP monitor of labs.    Rest of plan as above. DC home w/ family. 35 min spent preparing DC, counseling pt, and coordination of care.

## 2023-06-14 NOTE — DISCHARGE NOTE NURSING/CASE MANAGEMENT/SOCIAL WORK - NSDCPEFALRISK_GEN_ALL_CORE
For information on Fall & Injury Prevention, visit: [email or web address] OR  [email or web address] OR  https://www.cdc.gov/steadi/patient.html

## 2023-06-14 NOTE — PROGRESS NOTE ADULT - PROBLEM SELECTOR PLAN 2
Suspect pseudohyponatremia as serum osmolarity is 276. Possibly related to gamma gap  vs. SIADH 2/2 malignancy  - TSH wnl  - continue to monitor Na level  - Per renal, continue PO lasix 20mg and salt tabs to 2g TID  - trial of 1.5% hypertonic saline, did not improve sodium.   - Per renal, will attempt trial of 3% hypertonic saline   - following hypertonic saline, patient's corrected sodium improved to 130  - renal following, recs appreciated

## 2023-06-14 NOTE — DISCHARGE NOTE NURSING/CASE MANAGEMENT/SOCIAL WORK - PATIENT PORTAL LINK FT
You can access the FollowMyHealth Patient Portal offered by [facility] by registering at the following website: [email or web address]. By joining [facility]’s FollowMyHealth portal, you will also be able to view your health information using other applications (apps) compatible with our system.

## 2023-06-14 NOTE — PROGRESS NOTE ADULT - SUBJECTIVE AND OBJECTIVE BOX
Patient is a 73y old  Female who presents with a chief complaint of Abdominal pain (13 Jun 2023 12:25)     INTERVAL HPI/OVERNIGHT EVENTS:  - No acute events overnight    SUBJECTIVE  - Patient seen and evaluated at bedside. Patient states she feels well and is not in any pain or discomfort. Patient denies any fever, chills, chest pain, shortness of breath, nausea, vomiting, abdominal pain, constipation, or diarrhea.     MEDICATIONS  (STANDING):  aspirin enteric coated 81 milliGRAM(s) Oral daily  atorvastatin 20 milliGRAM(s) Oral at bedtime  dextrose 5%. 1000 milliLiter(s) (100 mL/Hr) IV Continuous <Continuous>  dextrose 5%. 1000 milliLiter(s) (50 mL/Hr) IV Continuous <Continuous>  dextrose 50% Injectable 25 Gram(s) IV Push once  dextrose 50% Injectable 12.5 Gram(s) IV Push once  dextrose 50% Injectable 25 Gram(s) IV Push once  furosemide    Tablet 20 milliGRAM(s) Oral daily  glucagon  Injectable 1 milliGRAM(s) IntraMuscular once  insulin glargine Injectable (LANTUS) 3 Unit(s) SubCutaneous at bedtime  insulin lispro (ADMELOG) corrective regimen sliding scale   SubCutaneous Before meals and at bedtime  insulin lispro Injectable (ADMELOG) 1 Unit(s) SubCutaneous three times a day before meals  metoprolol succinate ER 50 milliGRAM(s) Oral daily  senna 2 Tablet(s) Oral once  sodium chloride 2 Gram(s) Oral three times a day  sodium chloride 1.5%. 500 milliLiter(s) (50 mL/Hr) IV Continuous <Continuous>  sodium chloride 3%. 500 milliLiter(s) (30 mL/Hr) IV Continuous <Continuous>  valsartan 180 milliGRAM(s) Oral daily    MEDICATIONS  (PRN):  acetaminophen     Tablet .. 650 milliGRAM(s) Oral every 6 hours PRN Temp greater or equal to 38C (100.4F), Mild Pain (1 - 3)  aluminum hydroxide/magnesium hydroxide/simethicone Suspension 30 milliLiter(s) Oral every 4 hours PRN Dyspepsia  dextrose Oral Gel 15 Gram(s) Oral once PRN Blood Glucose LESS THAN 70 milliGRAM(s)/deciliter  melatonin 3 milliGRAM(s) Oral at bedtime PRN Insomnia  ondansetron Injectable 4 milliGRAM(s) IV Push every 8 hours PRN Nausea and/or Vomiting  polyethylene glycol 3350 17 Gram(s) Oral two times a day PRN Constipation        REVIEW OF SYSTEMS: As indicated above; otherwise, negative    VITAL SIGNS:  T(F): 99.1 (06-14-23 @ 05:06), Max: 99.9 (06-13-23 @ 21:30)  HR: 82 (06-14-23 @ 05:06) (79 - 100)  BP: 108/60 (06-14-23 @ 05:06) (104/60 - 120/68)  RR: 17 (06-14-23 @ 05:06) (17 - 17)  SpO2: 100% (06-14-23 @ 05:06) (100% - 100%)    PHYSICAL EXAM:  General: NAD, well-groomed, well-developed  Eyes: Pupils equal, round, reactive to light, EOMI, anicteric sclera   ENMT: slightly dry mucous membranes  Neck: supple, no JVD, no tender lymphadenopathy   Chest: Clear to auscultation bilaterally; no rales, rhonchi, or wheezing  Heart: Regular rate and rhythm; normal S1 and S2; no murmurs, rubs, or gallops  Abdomen: Soft, non-tender. non-distended, normoactive bowel sounds   Extremities: Warm, well perfused, no peripheral LE edema bilaterally, 5/5 strength in b/l lower extremities  Nervous System: AOx3, no focal neurological deficits   Psych: Appropriate affect    LABS:    13 Jun 2023 15:30    127    |  94     |  12     ----------------------------<  304    4.1     |  22     |  0.86     Ca    8.4        13 Jun 2023 15:30  Phos  3.1       13 Jun 2023 15:30  Mg     1.80      13 Jun 2023 15:30      CAPILLARY BLOOD GLUCOSE      POCT Blood Glucose.: 228 mg/dL (13 Jun 2023 21:09)  POCT Blood Glucose.: 205 mg/dL (13 Jun 2023 17:37)  POCT Blood Glucose.: 162 mg/dL (13 Jun 2023 12:29)  POCT Blood Glucose.: 193 mg/dL (13 Jun 2023 08:47)    BLOOD CULTURE    RADIOLOGY & ADDITIONAL TESTS:    Imaging Personally Reviewed:  [X ] YES     Consultant(s) Notes Reviewed:  Yes    Care Discussed with Consultants/Other Providers: Yes

## 2023-06-14 NOTE — PROGRESS NOTE ADULT - PROVIDER SPECIALTY LIST ADULT
Internal Medicine
Nephrology
Nephrology
Gastroenterology
Internal Medicine
Nephrology
Internal Medicine

## 2023-06-15 NOTE — CHART NOTE - NSCHARTNOTEFT_GEN_A_CORE
received call from medicine office stating pt's dtr ([employer] - [phone]) called requesting path results.    The results were sent to [phone] as per request  Follow up call placed to daughter following fax, but unable to reach. No response.  Resident Dr Davison already spoke w/ daughter this AM to review the results and address other concerns.    Discussed w/ team this morning. Will remain available if any other questions from family.

## 2023-09-18 ENCOUNTER — APPOINTMENT (OUTPATIENT)
Dept: ELECTROPHYSIOLOGY | Facility: CLINIC | Age: 73
End: 2023-09-18

## 2023-10-24 ENCOUNTER — APPOINTMENT (OUTPATIENT)
Dept: ELECTROPHYSIOLOGY | Facility: CLINIC | Age: 73
End: 2023-10-24

## 2024-04-11 ENCOUNTER — APPOINTMENT (OUTPATIENT)
Dept: ELECTROPHYSIOLOGY | Facility: CLINIC | Age: 74
End: 2024-04-11

## 2024-07-10 NOTE — PATIENT PROFILE ADULT - ARE SIGNIFICANT INDICATORS COMPLETE.
Head , normocephalic , atraumatic Yes Is This A New Presentation, Or A Follow-Up?: Phototherapy Treatment Additional History: Patient had no concerns with last treatment, no new medications, increase by 10% today-patient at max dose 1200mJ. When Was Your Last Phototherapy Treatment?: 07/11/19

## 2025-06-23 NOTE — ED PROVIDER NOTE - INTERNATIONAL TRAVEL
Patient has ASAP referral to podiatry for \"Detachment of nail, Onychomycosis of toenail, Thickened nails.\"     Please advise on ASAP referral.    No

## (undated) DEVICE — LUBRICATING JELLY HR ONE SHOT 3G

## (undated) DEVICE — SYS BIOPSY NDL FILE SS STRL 22G

## (undated) DEVICE — CATH BLLN ULTRASONIC ENSOSCOPE

## (undated) DEVICE — GOWN LG

## (undated) DEVICE — PACK IV START WITH CHG

## (undated) DEVICE — TUBING MEDI-VAC W MAXIGRIP CONNECTORS 1/4"X6'

## (undated) DEVICE — NDL ASPIR EXPECT SLIMLINE 19G

## (undated) DEVICE — DRSG BANDAID 0.75X3"

## (undated) DEVICE — CONTAINER FORMALIN 80ML YELLOW

## (undated) DEVICE — SALIVA EJECTOR (BLUE)

## (undated) DEVICE — DRSG CURITY GAUZE SPONGE 4 X 4" 12-PLY NON-STERILE

## (undated) DEVICE — UNDERPAD LINEN SAVER 17 X 24"

## (undated) DEVICE — BASIN EMESIS 10IN GRADUATED MAUVE

## (undated) DEVICE — BITE BLOCK ADULT 20 X 27MM (GREEN)

## (undated) DEVICE — DENTURE CUP PINK

## (undated) DEVICE — CLAMP BX HOT RAD JAW 3

## (undated) DEVICE — TUBING IV SET GRAVITY 3Y 100" MACRO

## (undated) DEVICE — CATH IV SAFE BC 22G X 1" (BLUE)

## (undated) DEVICE — BALLOON US ENDO

## (undated) DEVICE — BIOPSY FORCEP COLD DISP

## (undated) DEVICE — DRSG 2X2

## (undated) DEVICE — ELCTR ECG CONDUCTIVE ADHESIVE

## (undated) DEVICE — BIOPSY FORCEP RADIAL JAW 4 STANDARD WITH NEEDLE

## (undated) DEVICE — LINE BREATHE SAMPLNG